# Patient Record
Sex: FEMALE | Race: WHITE | NOT HISPANIC OR LATINO | Employment: OTHER | URBAN - METROPOLITAN AREA
[De-identification: names, ages, dates, MRNs, and addresses within clinical notes are randomized per-mention and may not be internally consistent; named-entity substitution may affect disease eponyms.]

---

## 2017-01-03 ENCOUNTER — OFFICE VISIT (OUTPATIENT)
Dept: MEDICAL GROUP | Age: 67
End: 2017-01-03
Payer: MEDICARE

## 2017-01-03 VITALS
WEIGHT: 239.6 LBS | HEART RATE: 84 BPM | TEMPERATURE: 98.3 F | DIASTOLIC BLOOD PRESSURE: 74 MMHG | HEIGHT: 64 IN | OXYGEN SATURATION: 98 % | SYSTOLIC BLOOD PRESSURE: 126 MMHG | RESPIRATION RATE: 16 BRPM | BODY MASS INDEX: 40.9 KG/M2

## 2017-01-03 DIAGNOSIS — Z09 HOSPITAL DISCHARGE FOLLOW-UP: ICD-10-CM

## 2017-01-03 DIAGNOSIS — J45.30 MILD PERSISTENT ASTHMA WITHOUT COMPLICATION: ICD-10-CM

## 2017-01-03 DIAGNOSIS — J10.1 INFLUENZA A H1N1 INFECTION: ICD-10-CM

## 2017-01-03 PROCEDURE — 99214 OFFICE O/P EST MOD 30 MIN: CPT | Performed by: PHYSICIAN ASSISTANT

## 2017-01-03 ASSESSMENT — PATIENT HEALTH QUESTIONNAIRE - PHQ9: CLINICAL INTERPRETATION OF PHQ2 SCORE: 0

## 2017-01-03 NOTE — ASSESSMENT & PLAN NOTE
Chronic history of asthma for about 10 years. Currently taking Advair. Has a rescue inhaler and nebulizer medication at home. Sees a pulmonologist Dr. Borja. Has an appointment with him tomorrow.

## 2017-01-03 NOTE — PROGRESS NOTES
Subjective:   Lauren Esquivel is a 66 y.o. female here today for hospitalization follow-up secondary to influenza complicated by asthma.    Influenza A H1N1 infection  This is a 66-year-old female with a history of asthma who developed influenza-like symptoms on Christmas so she has body aches and chills. She had a fever when she was evaluated at the [facility]. Eventually she was admitted into [facility]. Found to be positive for influenza A. Chest x-ray showed atelectasis. She was treated with Tamiflu and prednisone for 5 days. She finished her medications yesterday. She was discharged on oxygen. She is currently at 2 L. Has a pulse ox and her pulse ox has not dipped down below 90. Denies any current chest pain or shortness of breath. Today she was evaluated by respiratory therapist at home.    Mild persistent asthma without complication  Chronic history of asthma for about 10 years. Currently taking Advair. Has a rescue inhaler and nebulizer medication at home. Sees a pulmonologist Dr. Borja. Has an appointment with him tomorrow.       Current medicines (including changes today)  Current Outpatient Prescriptions   Medication Sig Dispense Refill   • telmisartan (MICARDIS) 40 MG Tab Take 1 Tab by mouth every day. 90 Tab 1   • albuterol (VENTOLIN OR PROVENTIL) 108 (90 BASE) MCG/ACT Aero Soln inhalation aerosol Inhale 2 Puffs by mouth every 6 hours as needed for Shortness of Breath.     • Ascorbic Acid (VITAMIN C PO) Take  by mouth.     • Cholecalciferol (VITAMIN D3) 2000 UNIT CAPS Take 1 Cap by mouth every day. 30 Cap 11   • fluticasone-salmeterol (ADVAIR) 100-50 MCG/DOSE AEPB Inhale 1 Puff by mouth every 12 hours.     • Multiple Vitamin (MULTI-VITAMIN) TABS Take 1 Tab by mouth every day.     • triamcinolone acetonide (KENALOG) 0.1 % Cream   2   • albuterol (PROVENTIL) 2.5mg/3ml Nebu Soln solution for nebulization 3 mL by Nebulization route every 6 hours as needed for Shortness of Breath. 75 mL 5     No  "current facility-administered medications for this visit.     She  has a past medical history of Sleep apnea; Anemia; COPD; ASTHMA; HTN; Wheeze (12/4/2009); Chronic rhinitis (1/3/2010); Anxiety state, unspecified (1/3/2010); Respiratory malfunction arising from mental factors (1/3/2010); Laryngeal spasm (1/3/2010); Preventative health care (8/11/2010); HTN (hypertension) (8/11/2010); Hyperlipidemia (8/28/2010); Vitamin d deficiency (8/28/2010); Mycoplasma pneumonia (2003); H/O: female infertility (8/10/2011); Elevated C-reactive protein (CRP) (2/6/2012); Hyperglycemia (6/4/2012); Metabolic syndrome (6/4/2012); Obesity (6/4/2012); Pyelonephritis (7/30/2012); Kidney filling defect (8/21/2012); H/O pyelonephritis (8/21/2012); History of nephrolithiasis (8/21/2012); and Influenza A with pneumonia (12/27/2016).    ROS   No chest pain, no shortness of breath, no abdominal pain and all other systems were reviewed and are negative.       Objective:     Blood pressure 126/74, pulse 84, temperature 36.8 °C (98.3 °F), resp. rate 16, height 1.626 m (5' 4.02\"), weight 108.682 kg (239 lb 9.6 oz), last menstrual period 06/04/2005, SpO2 98 %. Body mass index is 41.11 kg/(m^2).   Physical Exam:  Constitutional: Alert, no distress, wearing cannula.  Skin: Warm, dry, good turgor, no rashes in visible areas.  Eye: Equal, round and reactive, conjunctiva clear, lids normal.  ENMT: Lips without lesions, good dentition, oropharynx clear.  Neck: Trachea midline, no masses.   Lymph: No cervical or supraclavicular lymphadenopathy  Respiratory: Unlabored respiratory effort, lungs clear to auscultation, no wheezes, no ronchi.  Cardiovascular: Normal S1, S2, no murmur, no edema.  Abdomen: Soft, non-tender, no masses.  Psych: Alert and oriented x3, normal affect and mood.        Assessment and Plan:   The following treatment plan was discussed    1. Influenza A H1N1 infection  Acute new onset condition. Resolving. Finish medications. Advised to " return to the ED with any worsening symptoms such as chest pain or shortness of breath.    2. Mild persistent asthma without complication  Chronic condition and stable. Continue as a medications as directed. Follow-up with Dr. Borja, pulmonologist, tomorrow.    3. Hospital discharge follow-up      Followup: Return if symptoms worsen or fail to improve.    Please note that this dictation was created using voice recognition software. I have made every reasonable attempt to correct obvious errors, but I expect that there are errors of grammar and possibly content that I did not discover before finalizing the note.

## 2017-01-03 NOTE — ASSESSMENT & PLAN NOTE
This is a 66-year-old female with a history of asthma who developed influenza-like symptoms on Christmas so she has body aches and chills. She had a fever when she was evaluated at the [facility]. Eventually she was admitted into [facility]. Found to be positive for influenza A. Chest x-ray showed atelectasis. She was treated with Tamiflu and prednisone for 5 days. She finished her medications yesterday. She was discharged on oxygen. She is currently at 2 L. Has a pulse ox and her pulse ox has not dipped down below 90. Denies any current chest pain or shortness of breath. Today she was evaluated by respiratory therapist at home.

## 2017-01-03 NOTE — MR AVS SNAPSHOT
"        Lauren Parr Alvin   1/3/2017 3:00 PM   Office Visit   MRN: [id number]    Department:  [address] [place] [address]   Dept Phone:  [phone]    Description:  Female : 1950   Provider:  Jovanny Rucker PA-C           Reason for Visit     Hospital Follow-up [facility]-Influenza A positive and asthma      Allergies as of 1/3/2017     Allergen Noted Reactions    Ace Inhibitors 2009       Cough    Ciprofloxacin 2013         You were diagnosed with     Influenza A H1N1 infection   [803694]       Mild persistent asthma without complication   [601059]       Hospital discharge follow-up   [[id number]]         Vital Signs     Blood Pressure Pulse Temperature Respirations Height Weight    126/74 mmHg 84 36.8 °C (98.3 °F) 16 1.626 m (5' 4.02\") 108.682 kg (239 lb 9.6 oz)    Body Mass Index Oxygen Saturation Last Menstrual Period Smoking Status          41.11 kg/m2 98% 2005 Never Smoker         Basic Information     Date Of Birth Sex Race Ethnicity Preferred Language    1950 Female White Non- English      Your appointments     Feb 10, 2017 12:50 PM   Established Patient with Clair Blount M.D.   [address] [address] [phone]           You will be receiving a confirmation call a few days before your appointment from our automated call confirmation system.              Problem List              ICD-10-CM Priority Class Noted - Resolved    Sleep apnea G47.30   Unknown - Present    H/O: iron deficiency anemia Z86.2   Unknown - Present    Chronic rhinitis J31.0   1/3/2010 - Present    Preventative health care Z00.00   2010 - Present    HTN (hypertension) I10   2010 - Present    Hyperlipidemia E78.5   2010 - Present    Vitamin D insufficiency E55.9   2010 - Present    H/O: female infertility Z87.42   8/10/2011 - Present    Elevated C-reactive protein (CRP) R79.82   2012 - Present    Hyperglycemia " R73.9   6/4/2012 - Present    Metabolic syndrome E88.81   6/4/2012 - Present    Kidney filling defect R93.429   8/21/2012 - Present    H/O pyelonephritis Z87.440   8/21/2012 - Present    History of nephrolithiasis Z87.442   8/21/2012 - Present    Morbid obesity with body mass index of 40.0-44.9 in adult ([facility]) E66.01, Z68.41   1/23/2016 - Present    Influenza A H1N1 infection J10.1   12/27/2016 - Present    Mild persistent asthma without complication J45.30   1/3/2017 - Present      Health Maintenance        Date Due Completion Dates    IMM DTaP/Tdap/Td Vaccine (1 - Tdap) 8/16/1968 ---    IMM ZOSTER VACCINE 8/16/2009 ---    IMM INFLUENZA (1) 1/3/2018 (Originally 9/1/2016) 10/14/2009    IMM PNEUMOCOCCAL 65+ (ADULT) LOW/MEDIUM RISK SERIES (2 of 2 - PPSV23) 1/22/2017 1/22/2016    MAMMOGRAM 8/3/2017 8/3/2016, 7/24/2015, 2/10/2015, 2/10/2015, 2/5/2015, 1/29/2015, 1/29/2015, 7/16/2013, 8/24/2010, 8/24/2010    BONE DENSITY 2/5/2020 2/5/2015    COLONOSCOPY 9/16/2024 9/16/2014, 6/28/2007            Current Immunizations     13-VALENT PCV PREVNAR 1/22/2016    Influenza TIV (IM) 10/14/2009      Below and/or attached are the medications your provider expects you to take. Review all of your home medications and newly ordered medications with your provider and/or pharmacist. Follow medication instructions as directed by your provider and/or pharmacist. Please keep your medication list with you and share with your provider. Update the information when medications are discontinued, doses are changed, or new medications (including over-the-counter products) are added; and carry medication information at all times in the event of emergency situations     Allergies:  ACE INHIBITORS - (reactions not documented)     CIPROFLOXACIN - (reactions not documented)               Medications  Valid as of: January 03, 2017 -  3:48 PM    Generic Name Brand Name Tablet Size Instructions for use    Albuterol Sulfate (Nebu Soln) PROVENTIL 2.5mg/3ml 3  mL by Nebulization route every 6 hours as needed for Shortness of Breath.        Albuterol Sulfate (Aero Soln) albuterol 108 (90 BASE) MCG/ACT Inhale 2 Puffs by mouth every 6 hours as needed for Shortness of Breath.        Ascorbic Acid   Take  by mouth.        Cholecalciferol (Cap) Vitamin D3 2000 UNIT Take 1 Cap by mouth every day.        Fluticasone-Salmeterol (AEROSOL POWDER, BREATH ACTIVATED) ADVAIR 100-50 MCG/DOSE Inhale 1 Puff by mouth every 12 hours.        Multiple Vitamin (Tab) MULTI-VITAMIN  Take 1 Tab by mouth every day.        Telmisartan (Tab) MICARDIS 40 MG Take 1 Tab by mouth every day.        Triamcinolone Acetonide (Cream) KENALOG 0.1 %         .                 Medicines prescribed today were sent to:     [facility] #[id number] - [place], [place] - [address]    [address] [address]    Phone: [phone] Fax: [phone]    Open 24 Hours?: No      Medication refill instructions:       If your prescription bottle indicates you have medication refills left, it is not necessary to call your provider’s office. Please contact your pharmacy and they will refill your medication.    If your prescription bottle indicates you do not have any refills left, you may request refills at any time through one of the following ways: The online [employer] system (except Urgent Care), by calling your provider’s office, or by asking your pharmacy to contact your provider’s office with a refill request. Medication refills are processed only during regular business hours and may not be available until the next business day. Your provider may request additional information or to have a follow-up visit with you prior to refilling your medication.   *Please Note: Medication refills are assigned a new Rx number when refilled electronically. Your pharmacy may indicate that no refills were authorized even though a new prescription for the same medication is available at the pharmacy. Please request the medicine by name  with the pharmacy before contacting your provider for a refill.           MyChart Access Code: Activation code not generated  Current MyChart Status: Active

## 2017-02-10 ENCOUNTER — APPOINTMENT (OUTPATIENT)
Dept: MEDICAL GROUP | Age: 67
End: 2017-02-10
Payer: MEDICARE

## 2017-03-17 ENCOUNTER — TELEPHONE (OUTPATIENT)
Dept: MEDICAL GROUP | Age: 67
End: 2017-03-17

## 2017-03-21 ENCOUNTER — HOSPITAL ENCOUNTER (OUTPATIENT)
Dept: RADIOLOGY | Facility: MEDICAL CENTER | Age: 67
End: 2017-03-21
Attending: INTERNAL MEDICINE
Payer: MEDICARE

## 2017-03-21 DIAGNOSIS — J45.40 MODERATE PERSISTENT ASTHMA WITHOUT COMPLICATION: ICD-10-CM

## 2017-03-21 PROCEDURE — 71020 DX-CHEST-2 VIEWS: CPT

## 2017-03-27 RX ORDER — TELMISARTAN 40 MG/1
TABLET ORAL
Qty: 90 TAB | Refills: 0 | Status: SHIPPED | OUTPATIENT
Start: 2017-03-27 | End: 2017-06-28 | Stop reason: SDUPTHER

## 2017-05-26 ENCOUNTER — TELEPHONE (OUTPATIENT)
Dept: MEDICAL GROUP | Age: 67
End: 2017-05-26

## 2017-05-26 NOTE — TELEPHONE ENCOUNTER
ESTABLISHED PATIENT PRE-VISIT PLANNING     Note: Patient will not be contacted if there is no indication to call.     1.  Reviewed note from last office visit with PCP and/or other med group provider: Yes    2.  If any orders were placed at last visit, do we have Results/Consult Notes?        •  Labs - Labs ordered, completed and results are in chart.       •  Imaging - Imaging ordered, completed and results are in chart.       •  Referrals - No referrals were ordered at last office visit.    3.  Immunizations were updated in Epic using WebIZ?: Epic matches WebIZ       •  Web Iz Recommendations: HEPATITIS A  TDAP ZOSTAVAX (Shingles)    4.  Patient is due for the following Health Maintenance Topics:   Health Maintenance Due   Topic Date Due   • IMM DTaP/Tdap/Td Vaccine (1 - Tdap) 08/10/2008   • IMM ZOSTER VACCINE  08/16/2009   • IMM PNEUMOCOCCAL 65+ (ADULT) LOW/MEDIUM RISK SERIES (2 of 2 - PPSV23) 01/22/2017   • Annual Wellness Visit  01/22/2017           5.  Patient was not informed to arrive 15 min prior to their scheduled appointment and bring in their medication bottles.

## 2017-05-30 ENCOUNTER — APPOINTMENT (OUTPATIENT)
Dept: MEDICAL GROUP | Age: 67
End: 2017-05-30
Payer: MEDICARE

## 2017-06-01 ENCOUNTER — APPOINTMENT (OUTPATIENT)
Dept: MEDICAL GROUP | Age: 67
End: 2017-06-01
Payer: MEDICARE

## 2017-06-29 RX ORDER — TELMISARTAN 40 MG/1
TABLET ORAL
Qty: 90 TAB | Refills: 0 | Status: SHIPPED | OUTPATIENT
Start: 2017-06-29 | End: 2017-10-06 | Stop reason: SDUPTHER

## 2017-08-10 ENCOUNTER — PATIENT OUTREACH (OUTPATIENT)
Dept: HEALTH INFORMATION MANAGEMENT | Facility: OTHER | Age: 67
End: 2017-08-10

## 2017-08-10 NOTE — PROGRESS NOTES
Outcome: Requested Call Back    WebIZ Checked & Epic Updated:  yes    [facility] Verified: no    Attempt # 1

## 2017-08-21 NOTE — PROGRESS NOTES
Attempt #:2    WebIZ Checked & Epic Updated: yes  [facility]Connect Verified: no  Verify PCP: yes    Communication Preference Obtained: yes     Review Care Team: yes    Annual Wellness Visit Scheduling  1. Scheduling Status:Scheduled        Care Gap Scheduling (Attempt to Schedule EACH Overdue Care Gap!)     Health Maintenance Due   Topic Date Due   • IMM DTaP/Tdap/Td Vaccine (1 - Tdap) Informed   • IMM ZOSTER VACCINE  Informed   • IMM PNEUMOCOCCAL 65+ (ADULT) LOW/MEDIUM RISK SERIES (2 of 2 - PPSV23) Informed   • Annual Wellness Visit  Scheduled   • MAMMOGRAM  Already Scheduled         [employer] Activation: already active  [employer] Gosia: no  Virtual Visits: no  Opt In to Text Messages: no

## 2017-08-25 ENCOUNTER — HOSPITAL ENCOUNTER (OUTPATIENT)
Dept: RADIOLOGY | Facility: MEDICAL CENTER | Age: 67
End: 2017-08-25
Attending: FAMILY MEDICINE
Payer: MEDICARE

## 2017-08-25 DIAGNOSIS — Z12.31 SCREENING MAMMOGRAM, ENCOUNTER FOR: ICD-10-CM

## 2017-08-25 PROCEDURE — 77063 BREAST TOMOSYNTHESIS BI: CPT

## 2017-08-29 ENCOUNTER — OFFICE VISIT (OUTPATIENT)
Dept: MEDICAL GROUP | Facility: MEDICAL CENTER | Age: 67
End: 2017-08-29
Payer: MEDICARE

## 2017-08-29 VITALS
OXYGEN SATURATION: 93 % | SYSTOLIC BLOOD PRESSURE: 138 MMHG | HEIGHT: 65 IN | WEIGHT: 238 LBS | BODY MASS INDEX: 39.65 KG/M2 | TEMPERATURE: 99 F | DIASTOLIC BLOOD PRESSURE: 84 MMHG | HEART RATE: 71 BPM

## 2017-08-29 DIAGNOSIS — R93.429 KIDNEY FILLING DEFECT: ICD-10-CM

## 2017-08-29 DIAGNOSIS — R73.9 HYPERGLYCEMIA: ICD-10-CM

## 2017-08-29 DIAGNOSIS — Z87.442 HISTORY OF NEPHROLITHIASIS: ICD-10-CM

## 2017-08-29 DIAGNOSIS — Z00.00 MEDICARE ANNUAL WELLNESS VISIT, SUBSEQUENT: Primary | ICD-10-CM

## 2017-08-29 DIAGNOSIS — Z86.2 H/O: IRON DEFICIENCY ANEMIA: ICD-10-CM

## 2017-08-29 DIAGNOSIS — I10 ESSENTIAL HYPERTENSION: ICD-10-CM

## 2017-08-29 DIAGNOSIS — E88.810 METABOLIC SYNDROME: ICD-10-CM

## 2017-08-29 DIAGNOSIS — Z87.448 H/O PYELONEPHRITIS: ICD-10-CM

## 2017-08-29 DIAGNOSIS — R79.82 ELEVATED C-REACTIVE PROTEIN (CRP): ICD-10-CM

## 2017-08-29 DIAGNOSIS — E66.01 MORBID OBESITY WITH BODY MASS INDEX OF 40.0-44.9 IN ADULT (HCC): ICD-10-CM

## 2017-08-29 DIAGNOSIS — J10.1 INFLUENZA A H1N1 INFECTION: ICD-10-CM

## 2017-08-29 DIAGNOSIS — J45.30 MILD PERSISTENT ASTHMA WITHOUT COMPLICATION: ICD-10-CM

## 2017-08-29 DIAGNOSIS — E78.5 HYPERLIPIDEMIA, UNSPECIFIED HYPERLIPIDEMIA TYPE: ICD-10-CM

## 2017-08-29 DIAGNOSIS — J31.0 CHRONIC RHINITIS: ICD-10-CM

## 2017-08-29 DIAGNOSIS — Z87.42: ICD-10-CM

## 2017-08-29 DIAGNOSIS — Z00.00 PREVENTATIVE HEALTH CARE: ICD-10-CM

## 2017-08-29 DIAGNOSIS — E55.9 VITAMIN D INSUFFICIENCY: ICD-10-CM

## 2017-08-29 PROCEDURE — G0439 PPPS, SUBSEQ VISIT: HCPCS | Performed by: NURSE PRACTITIONER

## 2017-08-29 RX ORDER — FLUTICASONE PROPIONATE 110 UG/1
2 AEROSOL, METERED RESPIRATORY (INHALATION) 2 TIMES DAILY
COMMUNITY
End: 2018-05-08

## 2017-08-29 ASSESSMENT — PATIENT HEALTH QUESTIONNAIRE - PHQ9: CLINICAL INTERPRETATION OF PHQ2 SCORE: 0

## 2017-08-29 NOTE — PATIENT INSTRUCTIONS
Continue with care through Yessy Paz M.D..  Next Medicare Annual Wellness Visit is due in one year.    Continue care with specialists you are seeing for your chronic problems.    Attached is some preventative information for you to read.          Fall Prevention and Home Safety  Falls cause injuries and can affect all age groups. It is possible to prevent falls.   HOW TO PREVENT FALLS  · Wear shoes with rubber soles that do not have an opening for your toes.   · Keep the inside and outside of your house well lit.   · Use night lights throughout your home.   · Remove clutter from floors.   · Clean up floor spills.   · Remove throw rugs or fasten them to the floor with carpet tape.   · Do not place electrical cords across pathways.   · Put grab bars by your tub, shower, and toilet. Do not use towel bars as grab bars.   · Put handrails on both sides of the stairway. Fix loose handrails.   · Do not climb on stools or stepladders, if possible.   · Do not wax your floors.   · Repair uneven or unsafe sidewalks, walkways, or stairs.   · Keep items you use a lot within reach.   · Be aware of pets.   · Keep emergency numbers next to the telephone.   · Put smoke detectors in your home and near bedrooms.   Ask your doctor what other things you can do to prevent falls.  Document Released: 10/14/2010 Document Revised: 06/18/2013 Document Reviewed: 03/19/2013  ExitCare® Patient Information ©2013 [employer].    Exercise to Stay Healthy      Exercise helps you become and stay healthy.  EXERCISE IDEAS AND TIPS  Choose exercises that:  · You enjoy.   · Fit into your day.   You do not need to exercise really hard to be healthy. You can do exercises at a slow or medium level and stay healthy. You can:  · Stretch before and after working out.   · Try yoga, Pilates, or estefania chi.   · Lift weights.   · Walk fast, swim, jog, run, climb stairs, bicycle, dance, or rollerskate.   · Take aerobic classes.   Exercises that burn about 150  calories:  · Running 1 ½ miles in 15 minutes.   · Playing volleyball for 45 to 60 minutes.   · Washing and waxing a car for 45 to 60 minutes.   · Playing touch football for 45 minutes.   · Walking 1 ¾ miles in 35 minutes.   · Pushing a stroller 1 ½ miles in 30 minutes.   · Playing basketball for 30 minutes.   · Raking leaves for 30 minutes.   · Bicycling 5 miles in 30 minutes.   · Walking 2 miles in 30 minutes.   · Dancing for 30 minutes.   · Shoveling snow for 15 minutes.   · Swimming laps for 20 minutes.   · Walking up stairs for 15 minutes.   · Bicycling 4 miles in 15 minutes.   · Gardening for 30 to 45 minutes.   · Jumping rope for 15 minutes.   · Washing windows or floors for 45 to 60 minutes.     One suggestion is to start walking for 10 minutes after each meal.  This will help with digestion and help you to metabolize your meal.       For Weight Loss -   Recommend portion sizes with more fruits/vegetables/high fiber foods.  Stay away from white bread/pastas/white rice/white potatoes.  Increase Fluid intake to 6-8 glasses of water daily.   Eliminate soda's (diet and regular) from your fluid intake.      Document Released: 01/20/2012 Document Revised: 03/11/2013 Document Reviewed: 01/20/2012  ExitCare® Patient Information ©2013 [email or web address].    Fat and Cholesterol Control Diet  Cholesterol is a wax-like substance. It comes from your liver and is found in certain foods. There is good (HDL) and bad (LDL) cholesterol. Too much cholesterol in your blood can affect your heart. Certain foods can lower or raise your cholesterol. Eat foods that are low in cholesterol.  Saturated and trans fats are bad fats found in foods that will raise your cholesterol. Do not eat foods that are high in saturated and trans fats.  FOODS HIGHER IN SATURATED AND TRANS FATS  · Dairy products, such as whole milk, eggs, cheese, cream, and butter.   · Fatty meats, such as hot dogs, sausage, and salami.   · Fried foods.   · Trans fats which  are found in margarine and pre-made cookies, crackers, and baked goods.   · Tropical oils, such as coconut and palm oils.   Read package labels at the store. Do not buy products that use saturated or trans fats or hydrogenated oils. Find foods labeled:  · Low-fat.   · Low-saturated fat.   · Trans-fat-free.   · Low-cholesterol.   FOODS LOWER IN CHOLESTEROL  ·  Fruit.   · Vegetables.   · Beans, peas, and lentils.   · Fish.   · Lean meat, such as chicken (without skin) or ground turkey.   · Grains, such as barley, rice, couscous, bulgur wheat, and pasta.   · Heart-healthy tub margarine.   PREPARING YOUR FOOD  · Broil, bake, steam, or roast foods. Do not vieira food.   · Use non-stick cooking sprays.   · Use lemon or herbs to flavor food instead of using butter or stick margarine.   · Use nonfat yogurt, salsa, or low-fat dressings for salads.   Document Released: 06/18/2013 Document Reviewed: 06/18/2013  ExitCare® Patient Information ©2013 [employer].    Recommend annual flu vaccine.  Next due in Fall, 2017.  If you decide not to have the flu vaccine, recommend good handwashing and staying out of crowds during flu season.

## 2017-08-29 NOTE — ASSESSMENT & PLAN NOTE
7/27/2016   Glycohemoglobin 6.3  4.8 - 5.6 % Final   recom attention to carbohydrate intake, exercise  Followed by Yessy Paz M.D..

## 2017-08-29 NOTE — ASSESSMENT & PLAN NOTE
Chronic condition managed with current medical regimen  Elevated glucose, obesity, elevated CRP, htn, elevated lipids   Continue with current meds  Followed by Yessy Paz M.D..

## 2017-08-29 NOTE — ASSESSMENT & PLAN NOTE
Patient aware of relationship between weight and health and working on diet  Followed by Yessy Paz M.D..

## 2017-08-29 NOTE — ASSESSMENT & PLAN NOTE
Chronic condition managed with current medical regimen  Stable per review   Continue with current meds  Followed by Yessy Paz M.D..

## 2017-08-29 NOTE — ASSESSMENT & PLAN NOTE
Chronic condition managed with current medical regimen  Labs reviewed    Continue with diet management at this time  Followed by Yessy Paz M.D..

## 2017-08-29 NOTE — PROGRESS NOTES
CC:   Medicare Annual Wellness Visit    HPI:  Lauren is a 67 y.o. here for Medicare Annual Wellness Visit    Patient Active Problem List    Diagnosis Date Noted   • Mild persistent asthma without complication 01/03/2017   • Morbid obesity with body mass index of 40.0-44.9 in adult (CMS-HCC) 01/23/2016   • Kidney filling defect 08/21/2012   • History of nephrolithiasis 08/21/2012   • Hyperglycemia 06/04/2012   • Metabolic syndrome 06/04/2012   • Elevated C-reactive protein (CRP) 02/06/2012   • Hyperlipidemia 08/28/2010   • Vitamin D insufficiency 08/28/2010   • Preventative health care 08/11/2010   • HTN (hypertension) 08/11/2010   • Chronic rhinitis 01/03/2010   • Sleep apnea    • H/O: iron deficiency anemia      Current Outpatient Prescriptions   Medication Sig Dispense Refill   • cholecalciferol (VITAMIN D3) 5000 UNIT Cap Take 5,000 Units by mouth every day.     • fluticasone (FLOVENT HFA) 110 MCG/ACT Aerosol Inhale 2 Puffs by mouth 2 times a day.     • telmisartan (MICARDIS) 40 MG Tab TAKE 1 TAB BY MOUTH EVERY DAY. 90 Tab 0   • albuterol (VENTOLIN OR PROVENTIL) 108 (90 BASE) MCG/ACT Aero Soln inhalation aerosol Inhale 2 Puffs by mouth every 6 hours as needed for Shortness of Breath.     • albuterol (PROVENTIL) 2.5mg/3ml Nebu Soln solution for nebulization 3 mL by Nebulization route every 6 hours as needed for Shortness of Breath. 75 mL 5   • Ascorbic Acid (VITAMIN C PO) Take  by mouth.       No current facility-administered medications for this visit.       Current supplements: no  Chronic narcotic pain medicines: no  Allergies: Ace inhibitors and Ciprofloxacin  Exercise: yes  Current social contact/activities: Has support of family and friends      Screening:  Depression Screening    Little interest or pleasure in doing things?  0 - not at all  Feeling down, depressed , or hopeless? 0 - not at all  Patient Health Questionnaire Score: 0     If depressive symptoms identified deferred to follow up visit unless  specifically addressed in assessment and plan.    Interpretation of PHQ-9 Total Score   Score Severity   1-4 No Depression   5-9 Mild Depression   10-14 Moderate Depression   15-19 Moderately Severe Depression   20-27 Severe Depression    Screening for Cognitive Impairment    Three Minute Recall (apple, watch, jeronimo)  3/3    Draw clock face with all 12 numbers set to the hand to show 10 minutes past 11 o'clock  1 5  Cognitive concerns identified deferred for follow up unless specifically addressed in assessment and plan.    Fall Risk Assessment    Has the patient had two or more falls in the last year or any fall with injury in the last year?  No    Safety Assessment    Throw rugs on floor.  Yes  Handrails on all stairs.  Yes  Good lighting in all hallways.  Yes  Difficulty hearing.  No  Patient counseled about all safety risks that were identified.    Functional Assessment ADLs    Are there any barriers preventing you from cooking for yourself or meeting nutritional needs?  No.    Are there any barriers preventing you from driving safely or obtaining transportation?  No.    Are there any barriers preventing you from using a telephone or calling for help?  No.    Are there any barriers preventing you from shopping?  No.    Are there any barriers preventing you from taking care of your own finances?  No.    Are there any barriers preventing you from managing your medications?  No.    Are currently engaging any exercise or physical activity?  No.       Health Maintenance Summary                IMM DTaP/Tdap/Td Vaccine Overdue 8/10/2008      Done 8/9/2008 Imm Admin: TD Vaccine    IMM ZOSTER VACCINE Overdue 8/16/2009     IMM PNEUMOCOCCAL 65+ (ADULT) LOW/MEDIUM RISK SERIES Overdue 1/22/2017      Done 1/22/2016 Imm Admin: Pneumococcal Conjugate Vaccine (Prevnar/PCV-13)    Annual Wellness Visit Overdue 1/22/2017      Done 1/22/2016 Visit Dx: Medicare annual wellness visit, subsequent    IMM INFLUENZA Postponed 1/3/2018  Originally 9/1/2017. Patient Refused     Done 10/14/2009 Imm Admin: Influenza TIV (IM)    MAMMOGRAM Next Due 8/25/2018      Done 8/25/2017 MA-MAMMO SCREENING BILAT W/TOMOSYNTHESIS W/CAD     Patient has more history with this topic...    BONE DENSITY Next Due 2/5/2020      Done 2/5/2015 DS-BONE DENSITY STUDY (DEXA)    COLONOSCOPY Next Due 9/16/2024      Done 9/16/2014 AMB REFERRAL TO GI FOR COLONOSCOPY     Patient has more history with this topic...          Patient Care Team:  Yessy Paz M.D. as PCP - General (Family Medicine)  Aguilar Borja M.D. as Consulting Physician (Pulmonary Medicine)        Social History   Substance Use Topics   • Smoking status: Never Smoker   • Smokeless tobacco: Never Used   • Alcohol use No       Family History   Problem Relation Age of Onset   • Lung Disease Mother    • Hypertension Mother    • Heart Disease Father    • Hypertension Maternal Grandfather    • Stroke Brother      She  has a past medical history of Anemia; Anxiety state, unspecified (1/3/2010); ASTHMA; Chronic rhinitis (1/3/2010); COPD; Elevated C-reactive protein (CRP) (2/6/2012); H/O pyelonephritis (8/21/2012); H/O: female infertility (8/10/2011); History of nephrolithiasis (8/21/2012); HTN; HTN (hypertension) (8/11/2010); Hyperglycemia (6/4/2012); Hyperlipidemia (8/28/2010); Influenza A with pneumonia (12/27/2016); Kidney filling defect (8/21/2012); Laryngeal spasm (1/3/2010); Metabolic syndrome (6/4/2012); Mycoplasma pneumonia (2003); Obesity (6/4/2012); Preventative health care (8/11/2010); Pyelonephritis (7/30/2012); Respiratory malfunction arising from mental factors (1/3/2010); Sleep apnea; Vitamin d deficiency (8/28/2010); and Wheeze (12/4/2009). She also has no past medical history of Encounter for long-term (current) use of other medications.   Past Surgical History:   Procedure Laterality Date   • LAPAROSCOPY      for infertility   • TONSILLECTOMY AND ADENOIDECTOMY         ROS:    Ostomy or other tubes or  "amputations: no  Chronic oxygen use no  Last eye exam 2016  : Denies incontinence.   Gait: Uses no assistive device   Hearing excellent.    Dentition good    Exam: Blood pressure 138/84, pulse 71, temperature 37.2 °C (99 °F), height 1.651 m (5' 5\"), weight 108 kg (238 lb), last menstrual period 06/04/2005, SpO2 93 %. Body mass index is 39.61 kg/m².  Alert, oriented in no acute distress.  Eye contact is good, speech goal directed, affect calm      Assessment and Plan. The following treatment and monitoring plan is recommended for all problems as listed below:   Sleep apnea  CPAP nocly  Followed by pulm    H/O: iron deficiency anemia  Followed by Yessy Paz M.D..     Chronic rhinitis  Chronic condition managed with current medical regimen  Stable per review   Continue with current meds  Followed by Yessy Paz M.D..        Preventative health care  Managed by Yessy Paz M.D.     HTN (hypertension)  Chronic condition managed with current medical regimen  Stable per review   Continue with current meds  Followed by Yessy Paz M.D..        Hyperlipidemia  Chronic condition managed with current medical regimen  Labs reviewed    Continue with diet management at this time  Followed by Yessy Paz M.D..        Vitamin D insufficiency  No current lab in chart to evaluate   Continue with current meds  Followed by Yessy Paz M.D..     H/O: female infertility  Historical data    Elevated C-reactive protein (CRP)  7/27/2016   Glycohemoglobin 6.3  4.8 - 5.6 % Final   recom attention to carbohydrate intake, exercise  Followed by Yessy Paz M.D..       Hyperglycemia  Chronic condition managed with current medical regimen  7/27/2016   Glucose 102  65 - 99 mg/dL Final   Continue with diet management  Followed by Yessy Paz M.D..        Kidney filling defect  There is not a current u/s for comparison    H/O pyelonephritis  No recurrence    History of nephrolithiasis  Last event 2014  States stone was not caught  Followed by Yessy Paz M.D..     Morbid " obesity with body mass index of 40.0-44.9 in adult  Patient aware of relationship between weight and health and working on diet  Followed by Yessy Paz M.D..     Influenza A H1N1 infection  Historical data    Mild persistent asthma without complication  Chronic condition managed with current medical regimen  Stable per review   Continue with current meds  Followed by pulm       Metabolic syndrome  Chronic condition managed with current medical regimen  Elevated glucose, obesity, elevated CRP, htn, elevated lipids   Continue with current meds  Followed by Yessy Paz M.D..          Health Care Screening recommendations reviewed with patient today: per Patient Instructions  DPA/Advanced directive: .has NO advanced directive - not interested in additional information    Next office visit for recheck of chronic medical conditions is due with Yessy Paz M.D. in 2-3 months

## 2017-08-29 NOTE — ASSESSMENT & PLAN NOTE
Chronic condition managed with current medical regimen  Stable per review   Continue with current meds  Followed by pulm

## 2017-08-29 NOTE — ASSESSMENT & PLAN NOTE
Chronic condition managed with current medical regimen  7/27/2016   Glucose 102  65 - 99 mg/dL Final   Continue with diet management  Followed by Yessy Paz M.D..

## 2017-10-03 ENCOUNTER — OFFICE VISIT (OUTPATIENT)
Dept: MEDICAL GROUP | Age: 67
End: 2017-10-03
Payer: MEDICARE

## 2017-10-03 VITALS
TEMPERATURE: 98.3 F | DIASTOLIC BLOOD PRESSURE: 74 MMHG | SYSTOLIC BLOOD PRESSURE: 122 MMHG | HEART RATE: 91 BPM | BODY MASS INDEX: 39.55 KG/M2 | HEIGHT: 65 IN | OXYGEN SATURATION: 93 % | WEIGHT: 237.4 LBS

## 2017-10-03 DIAGNOSIS — M79.10 MUSCLE SORENESS: ICD-10-CM

## 2017-10-03 DIAGNOSIS — E03.9 HYPOTHYROIDISM, UNSPECIFIED TYPE: ICD-10-CM

## 2017-10-03 DIAGNOSIS — Z86.2 H/O: IRON DEFICIENCY ANEMIA: ICD-10-CM

## 2017-10-03 DIAGNOSIS — J45.30 MILD PERSISTENT ASTHMA WITHOUT COMPLICATION: ICD-10-CM

## 2017-10-03 DIAGNOSIS — E78.00 PURE HYPERCHOLESTEROLEMIA: ICD-10-CM

## 2017-10-03 DIAGNOSIS — I10 ESSENTIAL HYPERTENSION: Primary | ICD-10-CM

## 2017-10-03 DIAGNOSIS — G47.30 SLEEP APNEA, UNSPECIFIED TYPE: ICD-10-CM

## 2017-10-03 DIAGNOSIS — E55.9 VITAMIN D INSUFFICIENCY: ICD-10-CM

## 2017-10-03 DIAGNOSIS — Z23 NEED FOR ZOSTER VACCINATION: ICD-10-CM

## 2017-10-03 DIAGNOSIS — Z23 FLU VACCINE NEED: ICD-10-CM

## 2017-10-03 DIAGNOSIS — R73.03 PREDIABETES: ICD-10-CM

## 2017-10-03 DIAGNOSIS — E66.9 OBESITY (BMI 30-39.9): ICD-10-CM

## 2017-10-03 PROCEDURE — G0008 ADMIN INFLUENZA VIRUS VAC: HCPCS | Performed by: FAMILY MEDICINE

## 2017-10-03 PROCEDURE — 99215 OFFICE O/P EST HI 40 MIN: CPT | Mod: 25 | Performed by: FAMILY MEDICINE

## 2017-10-03 PROCEDURE — 90662 IIV NO PRSV INCREASED AG IM: CPT | Performed by: FAMILY MEDICINE

## 2017-10-03 NOTE — PROGRESS NOTES
Subjective:   CC:Establish care    HPI:     Lauren Esquivel is a 67 y.o. female, established patient of the clinic, presents with the following concerns:     1. Essential hypertension  Chronic, taking telmisartan as directed, denies any side effect.  Denies chest pain, shortness of breath, dyspnea on exertion, unusual edema, dizziness.    2. Pure hypercholesterolemia  Chronic, worsening hyperlipidemia. Dr. Blount recommended dietary modification, exercise, and weight loss. Patient has been monitoring her diet, however, she has been sick with multiple respiratory illnesses, she was not able to to exercise consistently. Her weight is unchanged.    3. Prediabetes  Previous blood test noticed elevated A1c 6.3. Patient endorses family history of type 2 diabetes.  She denies polyuria, polydipsia. She has been working on dietary restriction. However, as mentioned above, she was sick with multiple respiratory illnesses, she was not able to exercise. Her weight remains stable.    4. Hypothyroidism, unspecified type  Patient was diagnosed with hypothyroidism by naturopathic doctor. She was put on natural thyroid 65 mg per day. She notices some improvement of metabolism, however, no change in body weight    5. Vitamin D insufficiency  Chronic, taking 10,000 units of vitamin D daily per recommendation of naturopathic doctor.     6. Mild persistent asthma without complication  Chronic, well controlled, taking Advair as directed daily.  Last exacerbation was couple months ago requiring oral prednisone.    7. Sleep apnea, unspecified type  Currently, using CPAP consistently, has regular follow-up with pulmonology    8. H/O: iron deficiency anemia  Patient had history of iron deficiency anemia in distant past. She reports active bleeding, fatigue, decreased memory, concentration.    9. Obesity (BMI 30-39.9)  BMI 39.2, stable, working on dietary modification, exercises, and weight loss.   However, patient finds it difficult to  lose weight due to multiple acute respiratory illnesses over the past months.  However, she is feeling better, she will try to restart exercise program as tolerated.    10. Muscle soreness  Patient complains of chronic bilateral thigh muscle soreness and stiffness for 4-5 years.  Patient attributes her symptoms to history of taking fluoroquinolone for acute respiratory illness in distant past.  She denies any history of fever, autoimmune disease, weight loss, night sweats, skin rash, active cancer.  She does not take statin medication.    Current medicines (including changes today)  Current Outpatient Prescriptions   Medication Sig Dispense Refill   • fluticasone-salmeterol (ADVAIR) 250-50 MCG/DOSE AEROSOL POWDER, BREATH ACTIVATED Inhale 1 Puff by mouth every 12 hours.     • Zoster Vaccine Live (ZOSTAVAX) 09962 UNT/0.65ML Recon Susp Inject 0.65 mL as instructed Once for 1 dose. 0.65 mL 0   • cholecalciferol (VITAMIN D3) 5000 UNIT Cap Take 5,000 Units by mouth every day.     • fluticasone (FLOVENT HFA) 110 MCG/ACT Aerosol Inhale 2 Puffs by mouth 2 times a day.     • telmisartan (MICARDIS) 40 MG Tab TAKE 1 TAB BY MOUTH EVERY DAY. 90 Tab 0   • Ascorbic Acid (VITAMIN C PO) Take  by mouth.     • NATURE-THROID 65 MG tablet   1     No current facility-administered medications for this visit.      She  has a past medical history of Anemia; Anxiety state, unspecified (1/3/2010); ASTHMA; Chronic rhinitis (1/3/2010); COPD; Elevated C-reactive protein (CRP) (2/6/2012); H/O pyelonephritis (8/21/2012); H/O: female infertility (8/10/2011); History of nephrolithiasis (8/21/2012); HTN; HTN (hypertension) (8/11/2010); Hyperglycemia (6/4/2012); Hyperlipidemia (8/28/2010); Influenza A with pneumonia (12/27/2016); Kidney filling defect (8/21/2012); Laryngeal spasm (1/3/2010); Metabolic syndrome (6/4/2012); Mycoplasma pneumonia (2003); Obesity (6/4/2012); Preventative health care (8/11/2010); Pyelonephritis (7/30/2012); Respiratory  "malfunction arising from mental factors (1/3/2010); Sleep apnea; Vitamin d deficiency (8/28/2010); and Wheeze (12/4/2009). She also has no past medical history of Encounter for long-term (current) use of other medications.    I personally reviewed patient's problem list, allergies, medications, family hx, social hx with patient and update EPIC.     REVIEW OF SYSTEMS:  CONSTITUTIONAL:  Denies night sweats, fatigue, malaise, lethargy, fever or chills.  RESPIRATORY:  Denies cough, wheeze, hemoptysis, or shortness of breath.  CARDIOVASCULAR:  Denies chest pains, palpitations, pedal edema     Objective:     Blood pressure 122/74, pulse 91, temperature 36.8 °C (98.3 °F), height 1.657 m (5' 5.25\"), weight 107.7 kg (237 lb 6.4 oz), last menstrual period 06/04/2005, SpO2 93 %. Body mass index is 39.2 kg/m².    Physical Exam:  Constitutional: awake, alert, in no distress. Obese.   Skin: Warm, dry, good turgor, no rashes, bruises, ulcers in visible areas.  Eye: conjunctiva clear, lids neg for edema or lesions.  Respiratory: Unlabored respiratory effort, lungs clear to auscultation, no wheezes, no rhonchi.  Cardiovascular: Normal S1, S2, no murmur, no pedal edema.  Abdomen: Soft, non-tender to palpation, no hernia, no hepatosplenomegaly.  Neuro: CN2-12 grossly intact. Strength 5/5, reflexes 2/4 in all extremities, no sensory deficits.   Psych: Oriented x3, affect and mood wnl, intact judgement and insight.   MSK: thigh muscle mildly tender to palpation, strength 5/5, reflexes 2/4, normal gait, able to rise w/o arm assistance from sitting position w/o problems.       Assessment and Plan:   The following treatment plan was discussed    1. Essential hypertension  Chronic, well controlled with telmisartan, blood pressure 122/74 today. Will continue.  - COMP METABOLIC PANEL; Future  - MICROALBUMIN CREAT RATIO URINE; Future    2. Pure hypercholesterolemia  Chronic, worsening hypercholesterolemia, patient is working on dietary " modification and exercise without success.  She is not interested in taking statin, however, she is open taking medicine if cholesterol continues continue to get worse. Plan  - LIPID PROFILE; Future  - Follow up plan depends on the results of her blood test  - Continue dietary modification, exercise, weight loss    3. Prediabetes  History of elevated A1c, 6.3 last year. Positive from the year history of type 2 diabetes. Denies symptoms of polyuria, polydipsia, and unexplained weight loss. Plan  - HEMOGLOBIN A1C; Future  - Continue lifestyle modification, weight loss    4. Hypothyroidism, unspecified type  Chronic, currently taking natural thyroid 65 mg daily, currently being managed by a naturopathic doctor. Plan:  - TSH WITH REFLEX TO FT4; Future    5. Vitamin D insufficiency  - VITAMIN D,25 HYDROXY; Future    6. Mild persistent asthma without complication  Chronic, well controlled with Advair, last exacerbation was couple months ago.  Asthma exacerbation is usually triggered by allergens, smoke, respiratory illnesses.  Patient had pneumonia vaccine in 2016, she believed she had another pneumonia vaccine from her pulmonologist.  She requesting flu vaccine today.    7. Sleep apnea, unspecified type  Chronic, continue CPAP.    8. H/O: iron deficiency anemia  - CBC WITH DIFFERENTIAL; Future    9. Obesity (BMI 30-39.9)  - Patient identified as having weight management issue.  Appropriate orders and counseling given.    10. Flu vaccine need  - INFLUENZA VACCINE, HIGH DOSE (65+ ONLY)    11. Need for zoster vaccination  - Zoster Vaccine Live (ZOSTAVAX) 23692 UNT/0.65ML Recon Susp; Inject 0.65 mL as instructed Once for 1 dose.  Dispense: 0.65 mL; Refill: 0    12. Muscle soreness  - TSH WITH REFLEX TO FT4; Future  - WESTERGREN SED RATE; Future  - CREATINE KINASE; Future    Total 40 minutes face-to-face time spent with patient, with greater than 50% of the total time discussing patient's issues and symptoms as listed above  in assessment and plan, as well as managing coordination of care for future evaluation and treatment.    Yessy Paz M.D.      Followup: Return in about 6 months (around 4/3/2018) for Annual wellness visit, [place].    Please note that this dictation was created using voice recognition software. I have made every reasonable attempt to correct obvious errors, but I expect that there are errors of grammar and possibly content that I did not discover before finalizing the note.

## 2017-10-06 RX ORDER — TELMISARTAN 40 MG/1
TABLET ORAL
Qty: 90 TAB | Refills: 1 | Status: SHIPPED | OUTPATIENT
Start: 2017-10-06 | End: 2018-04-01 | Stop reason: SDUPTHER

## 2017-11-07 LAB
25(OH)D3+25(OH)D2 SERPL-MCNC: 37 NG/ML (ref 30–100)
ALBUMIN SERPL-MCNC: 4.3 G/DL (ref 3.6–4.8)
ALBUMIN/CREAT UR: <9.1 MG/G CREAT (ref 0–30)
ALBUMIN/GLOB SERPL: 1.3 {RATIO} (ref 1.2–2.2)
ALP SERPL-CCNC: 62 IU/L (ref 39–117)
ALT SERPL-CCNC: 24 IU/L (ref 0–32)
AST SERPL-CCNC: 20 IU/L (ref 0–40)
BASOPHILS # BLD AUTO: 0.1 X10E3/UL (ref 0–0.2)
BASOPHILS NFR BLD AUTO: 1 %
BILIRUB SERPL-MCNC: 0.4 MG/DL (ref 0–1.2)
BUN SERPL-MCNC: 14 MG/DL (ref 8–27)
BUN/CREAT SERPL: 19 (ref 12–28)
CALCIUM SERPL-MCNC: 9.6 MG/DL (ref 8.7–10.3)
CHLORIDE SERPL-SCNC: 102 MMOL/L (ref 96–106)
CHOLEST SERPL-MCNC: 249 MG/DL (ref 100–199)
CO2 SERPL-SCNC: 24 MMOL/L (ref 18–29)
COMMENT 011824: ABNORMAL
CREAT SERPL-MCNC: 0.74 MG/DL (ref 0.57–1)
CREAT UR-MCNC: 32.8 MG/DL
EOSINOPHIL # BLD AUTO: 0.8 X10E3/UL (ref 0–0.4)
EOSINOPHIL NFR BLD AUTO: 10 %
ERYTHROCYTE [DISTWIDTH] IN BLOOD BY AUTOMATED COUNT: 13.8 % (ref 12.3–15.4)
GFR SERPLBLD CREATININE-BSD FMLA CKD-EPI: 84 ML/MIN/1.73
GFR SERPLBLD CREATININE-BSD FMLA CKD-EPI: 96 ML/MIN/1.73
GLOBULIN SER CALC-MCNC: 3.2 G/DL (ref 1.5–4.5)
GLUCOSE SERPL-MCNC: 103 MG/DL (ref 65–99)
HBA1C MFR BLD: 6.1 % (ref 4.8–5.6)
HCT VFR BLD AUTO: 42.4 % (ref 34–46.6)
HDLC SERPL-MCNC: 63 MG/DL
HGB BLD-MCNC: 13.7 G/DL (ref 11.1–15.9)
IMM GRANULOCYTES # BLD: 0 X10E3/UL (ref 0–0.1)
IMM GRANULOCYTES NFR BLD: 0 %
IMMATURE CELLS  115398: ABNORMAL
LDLC SERPL CALC-MCNC: 165 MG/DL (ref 0–99)
LYMPHOCYTES # BLD AUTO: 1.9 X10E3/UL (ref 0.7–3.1)
LYMPHOCYTES NFR BLD AUTO: 25 %
MCH RBC QN AUTO: 27.9 PG (ref 26.6–33)
MCHC RBC AUTO-ENTMCNC: 32.3 G/DL (ref 31.5–35.7)
MCV RBC AUTO: 86 FL (ref 79–97)
MICROALBUMIN UR-MCNC: <3 UG/ML
MONOCYTES # BLD AUTO: 0.6 X10E3/UL (ref 0.1–0.9)
MONOCYTES NFR BLD AUTO: 8 %
MORPHOLOGY BLD-IMP: ABNORMAL
NEUTROPHILS # BLD AUTO: 4.4 X10E3/UL (ref 1.4–7)
NEUTROPHILS NFR BLD AUTO: 56 %
NRBC BLD AUTO-RTO: ABNORMAL %
PLATELET # BLD AUTO: 362 X10E3/UL (ref 150–379)
POTASSIUM SERPL-SCNC: 4.4 MMOL/L (ref 3.5–5.2)
PROT SERPL-MCNC: 7.5 G/DL (ref 6–8.5)
RBC # BLD AUTO: 4.91 X10E6/UL (ref 3.77–5.28)
SODIUM SERPL-SCNC: 143 MMOL/L (ref 134–144)
TRIGL SERPL-MCNC: 106 MG/DL (ref 0–149)
TSH SERPL DL<=0.005 MIU/L-ACNC: 1.43 UIU/ML (ref 0.45–4.5)
VLDLC SERPL CALC-MCNC: 21 MG/DL (ref 5–40)
WBC # BLD AUTO: 7.8 X10E3/UL (ref 3.4–10.8)

## 2018-01-22 ENCOUNTER — OFFICE VISIT (OUTPATIENT)
Dept: MEDICAL GROUP | Age: 68
End: 2018-01-22
Payer: MEDICARE

## 2018-01-22 VITALS
HEIGHT: 65 IN | BODY MASS INDEX: 39.79 KG/M2 | SYSTOLIC BLOOD PRESSURE: 115 MMHG | WEIGHT: 238.8 LBS | TEMPERATURE: 99.3 F | OXYGEN SATURATION: 97 % | DIASTOLIC BLOOD PRESSURE: 74 MMHG | HEART RATE: 110 BPM

## 2018-01-22 DIAGNOSIS — I10 ESSENTIAL HYPERTENSION: ICD-10-CM

## 2018-01-22 DIAGNOSIS — E78.00 PURE HYPERCHOLESTEROLEMIA: ICD-10-CM

## 2018-01-22 DIAGNOSIS — J40 BRONCHITIS: Primary | ICD-10-CM

## 2018-01-22 DIAGNOSIS — R73.03 PREDIABETES: ICD-10-CM

## 2018-01-22 DIAGNOSIS — E66.9 OBESITY (BMI 30-39.9): ICD-10-CM

## 2018-01-22 DIAGNOSIS — J45.30 MILD PERSISTENT ASTHMA WITHOUT COMPLICATION: ICD-10-CM

## 2018-01-22 LAB
FLUAV+FLUBV AG SPEC QL IA: NEGATIVE
INT CON NEG: NEGATIVE
INT CON POS: POSITIVE

## 2018-01-22 PROCEDURE — 99214 OFFICE O/P EST MOD 30 MIN: CPT | Performed by: FAMILY MEDICINE

## 2018-01-22 PROCEDURE — 87804 INFLUENZA ASSAY W/OPTIC: CPT | Performed by: FAMILY MEDICINE

## 2018-01-22 RX ORDER — AZITHROMYCIN 250 MG/1
TABLET, FILM COATED ORAL
Qty: 6 TAB | Refills: 0 | Status: SHIPPED | OUTPATIENT
Start: 2018-01-22 | End: 2018-01-27

## 2018-01-22 RX ORDER — PREDNISONE 20 MG/1
40 TABLET ORAL DAILY
Qty: 10 TAB | Refills: 0 | Status: SHIPPED | OUTPATIENT
Start: 2018-01-22 | End: 2018-01-27

## 2018-01-22 RX ORDER — ALBUTEROL SULFATE 90 UG/1
2 AEROSOL, METERED RESPIRATORY (INHALATION) EVERY 6 HOURS PRN
Qty: 8.5 G | Refills: 2 | Status: SHIPPED | OUTPATIENT
Start: 2018-01-22 | End: 2018-09-18 | Stop reason: SDUPTHER

## 2018-01-22 NOTE — PROGRESS NOTES
Subjective:   CC: Flu symptoms    HPI:     Lauren Esquivel is a 67 y.o. Female with history of mild intermittent asthma, hypertension, lipidemia, prediabetes, and sleep apnea who presents with a five-day history of nonproductive cough, nasal congestion, nasal discharge, sore throat, mild shortness of breath, chills, subjective fever, and diffuse joint and muscle soreness. Patient states that it's very hard for her to keep her warm at night. Her cough is worse at night interfering with sleep and daytime productivity. She has tried a couple over-the-counter medications, which appear to help her, she is concerns of possible pneumonia infection as she had history of having bone infection in the past.    She is taking Advair consistently for asthma. However, she ran out of albuterol and requesting refill as her pulmonologist has retired.    She also wanted to discuss the results of her recent blood tests, which show prediabetes and hyperlipidemia. Patient is active, but no regular exercise. She has no dietary restrictions. She did try to lose weight on her own couple times without any success. She is very resistant to taking statin due to concerns of side effects.    Current medicines (including changes today)  Current Outpatient Prescriptions   Medication Sig Dispense Refill   • albuterol 108 (90 Base) MCG/ACT Aero Soln inhalation aerosol Inhale 2 Puffs by mouth every 6 hours as needed for Shortness of Breath. 8.5 g 2   • Hydrocod Polst-CPM Polst ER (TUSSIONEX) 10-8 MG/5ML Suspension Extended Release Take 5 mL by mouth every 12 hours for 10 days. 100 mL 0   • azithromycin (ZITHROMAX) 250 MG Tab Take 2 tablets on the first day and 1 tablet daily thereafter for 4 days. 6 Tab 0   • predniSONE (DELTASONE) 20 MG Tab Take 2 Tabs by mouth every day for 5 days. 10 Tab 0   • telmisartan (MICARDIS) 40 MG Tab TAKE 1 TAB BY MOUTH EVERY DAY. 90 Tab 1   • fluticasone-salmeterol (ADVAIR) 250-50 MCG/DOSE AEROSOL POWDER, BREATH  "ACTIVATED Inhale 1 Puff by mouth every 12 hours.     • cholecalciferol (VITAMIN D3) 5000 UNIT Cap Take 5,000 Units by mouth every day.     • fluticasone (FLOVENT HFA) 110 MCG/ACT Aerosol Inhale 2 Puffs by mouth 2 times a day.     • Ascorbic Acid (VITAMIN C PO) Take  by mouth.     • NATURE-THROID 65 MG tablet   1     No current facility-administered medications for this visit.      She  has a past medical history of Anemia; Anxiety state, unspecified (1/3/2010); ASTHMA; Chronic rhinitis (1/3/2010); COPD; Elevated C-reactive protein (CRP) (2/6/2012); H/O pyelonephritis (8/21/2012); H/O: female infertility (8/10/2011); History of nephrolithiasis (8/21/2012); HTN; HTN (hypertension) (8/11/2010); Hyperglycemia (6/4/2012); Hyperlipidemia (8/28/2010); Influenza A with pneumonia (12/27/2016); Kidney filling defect (8/21/2012); Laryngeal spasm (1/3/2010); Metabolic syndrome (6/4/2012); Mycoplasma pneumonia (2003); Obesity (6/4/2012); Preventative health care (8/11/2010); Pyelonephritis (7/30/2012); Respiratory malfunction arising from mental factors (1/3/2010); Sleep apnea; Vitamin d deficiency (8/28/2010); and Wheeze (12/4/2009). She also has no past medical history of Encounter for long-term (current) use of other medications.    I personally reviewed patient's problem list, allergies, medications, family hx, social hx with patient and update [facility].     REVIEW OF SYSTEMS:  CONSTITUTIONAL:  Denies night sweats, fatigue, malaise, lethargy, fever or chills.  RESPIRATORY:  Denies cough, wheeze, hemoptysis, or shortness of breath.  CARDIOVASCULAR:  Denies chest pains, palpitations, pedal edema     Objective:     Blood pressure 115/74, pulse (!) 110, temperature 37.4 °C (99.3 °F), height 1.657 m (5' 5.25\"), weight 108.3 kg (238 lb 12.8 oz), last menstrual period 06/04/2005, SpO2 97 %. Body mass index is 39.43 kg/m².    Physical Exam:  Constitutional: awake, alert, in no distress.  Skin: Warm, dry, good turgor, no rashes, bruises, " ulcers in visible areas.  Eye: conjunctiva clear, lids neg for edema or lesions.  ENMT: TM and auditory canals wnl. Inflamed mucosa wnl. Lips without lesions, good dentition, oropharynx clear.  Neck: Trachea midline, no masses, no thyromegaly. No cervical or supraclavicular lymphadenopathy  Respiratory: Unlabored respiratory effort, lungs clear to auscultation, mild wheezes and pleural rub at the right lower base, no rhonchi, no rales.  Cardiovascular: Normal S1, S2, no murmur, no pedal edema.  Psych: Oriented x3, affect and mood wnl, intact judgement and insight.       Assessment and Plan:   The following treatment plan was discussed    1. Mild persistent asthma without complication, Dr Borja  2. Bronchitis  History of mild persistent asthma, previously managed by Dr. Borja who recently retired. Patient presents with five-day history of upper respiratory tract symptoms. Exam is concerning for bronchitis. Rapid flu test was negative. Plans:  - albuterol 108 (90 Base) MCG/ACT Aero Soln inhalation aerosol; Inhale 2 Puffs by mouth every 6 hours as needed for Shortness of Breath.  Dispense: 8.5 g; Refill: 2  - Hydrocod Polst-CPM Polst ER (TUSSIONEX) 10-8 MG/5ML Suspension Extended Release; Take 5 mL by mouth every 12 hours for 10 days.  Dispense: 100 mL; Refill: 0  - azithromycin (ZITHROMAX) 250 MG Tab; Take 2 tablets on the first day and 1 tablet daily thereafter for 4 days.  Dispense: 6 Tab; Refill: 0  - Hydration, rest  - Follow-up when necessary    3. Prediabetes  Chronic, A1c was 6.1 in November 2017. This is an improvement from her previous A1c in July 2016 (6.3). Patient has been working on lifestyle modification inconsistently. She is obese, and continues experiencing weight gain. She is interested in [facility] improvement referral for weight loss and dietary counseling for prediabetes and hyperlipidemia.  - REFERRAL TO [facility] HEALTH IMPROVEMENT PROGRAMS (HIP) Services Requested: Registered Dietitian for  Medical Nutrition Therapy, Medicare Obesity Counseling, Medical Weight Management Program; Reason for Referral? BMI>30; Reason for Visit: Overweight/...    5. Essential hypertension  Chronic, well controlled with telmisartan, blood pressure 115/74 today, we'll continue with telmisartan 40 mg daily.    6. Pure hypercholesterolemia  Chronic hyperlipidemia, patient is resistant to statin in the past. She tried lifestyle modification without improvement. Her 10 year cardiovascular risk is greater than 10. I discussed the risks of chronic untreated hyperlipidemia on her cardiovascular health. Patient is interested in nutritional counseling. Plans:  - REFERRAL TO [facility] IMPROVEMENT PROGRAMS (HIP) Services Requested: Registered Dietitian for Medical Nutrition Therapy, Medicare Obesity Counseling, Medical Weight Management Program; Reason for Referral? BMI>30; Reason for Visit: Overweight/...    7. Obesity (BMI 30-39.9)  - REFERRAL TO [facility] IMPROVEMENT PROGRAMS (HIP) Services Requested: Registered Dietitian for Medical Nutrition Therapy, Medicare Obesity Counseling, Medical Weight Management Program; Reason for Referral? BMI>30; Reason for Visit: Overweight/...      Yessy Paz M.D.      Followup: Return in about 3 months (around 4/22/2018) for As needed, Annual wellness visit.    Please note that this dictation was created using voice recognition software. I have made every reasonable attempt to correct obvious errors, but I expect that there are errors of grammar and possibly content that I did not discover before finalizing the note.

## 2018-02-07 ENCOUNTER — OFFICE VISIT (OUTPATIENT)
Dept: HEALTH INFORMATION MANAGEMENT | Facility: MEDICAL CENTER | Age: 68
End: 2018-02-07
Payer: MEDICARE

## 2018-02-07 VITALS
DIASTOLIC BLOOD PRESSURE: 60 MMHG | TEMPERATURE: 97.8 F | WEIGHT: 238.4 LBS | BODY MASS INDEX: 39.72 KG/M2 | HEIGHT: 65 IN | OXYGEN SATURATION: 96 % | HEART RATE: 90 BPM | SYSTOLIC BLOOD PRESSURE: 122 MMHG

## 2018-02-07 DIAGNOSIS — I10 ESSENTIAL HYPERTENSION: ICD-10-CM

## 2018-02-07 DIAGNOSIS — E55.9 VITAMIN D INSUFFICIENCY: ICD-10-CM

## 2018-02-07 DIAGNOSIS — R73.03 PREDIABETES: ICD-10-CM

## 2018-02-07 DIAGNOSIS — E78.00 PURE HYPERCHOLESTEROLEMIA: ICD-10-CM

## 2018-02-07 DIAGNOSIS — G47.33 OBSTRUCTIVE SLEEP APNEA SYNDROME: ICD-10-CM

## 2018-02-07 DIAGNOSIS — E66.01 MORBID OBESITY WITH BODY MASS INDEX OF 40.0-44.9 IN ADULT (HCC): ICD-10-CM

## 2018-02-07 DIAGNOSIS — E88.810 METABOLIC SYNDROME: ICD-10-CM

## 2018-02-07 PROCEDURE — 99205 OFFICE O/P NEW HI 60 MIN: CPT | Mod: 25 | Performed by: INTERNAL MEDICINE

## 2018-02-07 PROCEDURE — 93000 ELECTROCARDIOGRAM COMPLETE: CPT | Performed by: INTERNAL MEDICINE

## 2018-02-07 NOTE — PROGRESS NOTES
Bariatric Medicine H&P  Chief Complaint   Patient presents with   • Weight Gain       Referred by:  Yessy Paz M.D.    History of Present Illness:   Lauren Esquivel is a 67 y.o.  female who presents for weight management and to help address co-morbidities related to overweight, including prediabetes, hyperglycemia, metabolic syndrome, hyperlipidemia, vitamin D deficiency, hypertension.    The patient presents upon referral from her primary care physician. Over the last 10 years, the patient is developing comorbidities related to her obesity. She had nephrolithiasis, broken ankle, hospitalized with the flu and has had asthma. She realizes she is not getting younger, needs to improve her health overall. She has 2 adopted children, who are 19 and 23, and she wants to live a long time to watch them grow up. Since she last met her primary care physician last year, she has lost about 10 pounds. However, she still feels short of breath and fatigued.     She continues on CPAP and vitamin D repletion. Her blood pressure is diet controlled, she is not on medication for hyperglycemia, hypertension or hyperlipidemia.    The patient states she had significant financial difficulties since the recession 10 years ago, slowly improving. She gets depressed at times. She has not been part of a formal weight loss program in many years, not since the 1990s when she tried Weight Watchers. She feels stressed, tries to manage it herself with portion control. She has not been on weight loss medications in the past.    Typical intake includes one egg, toast, fruit which is half a banana, susy or kiwi or berries. Sometimes she has bread with almond butter. For lunch she has leftovers or a sandwich. For dinner she has meat or fish and vegetables and salad, sometimes soup. Snacks include oranges, cookies but only one or 2. After dinner she has a piece of fruit or candy but only one piece. She states she does not snack much  "during the day. She cut out soft drinks 2 years ago. She drinks water and coffee for liquids. She mostly cooks herself.      Behavior-Related History:  Binge eating screen: Positive       Exercise:   None. Feels short of breath.    Life Style Changes:  Significant life stressors, including infertility, adopted children, financial difficulties.     Review of Systems   Fatigue, shortness of breath, depression and anxiety. Back pain, joint pain.  Sleep apnea screen: Positive, on CPAP  All other ROS were reviewed with patient today and are negative.      PMH/PSH:  I have reviewed the patient's medical, social and family history, allergies, and medications today.  Prior records reviewed.  Personal Hx of Bariatric Surgery: No      Physical Exam:   /60   Pulse 90   Temp 36.6 °C (97.8 °F)   Ht 1.651 m (5' 5\")   Wt 108.1 kg (238 lb 6.4 oz)   LMP 06/04/2005   SpO2 96%   BMI 39.67 kg/m²   Waist: 47 in  Body fat % 55.2  REE 1641 kcal/day    Constitutional: Oriented to person, place, and time and well-developed, well-nourished, and in no distress.    HENT: No facial plethora.  No Cushingoid features.  No scalloped tongue.  No dental erosions.  No swollen parotids.  Head: Normocephalic.   Eyes: EOM are normal. Pupils are equal, round, and reactive to light. No periorbital edema.  No lateral thinning of eyebrows.  No vertical nystagmus.  Neck: Normal range of motion. Neck supple. No thyromegaly present. No buffalo hump.  Cardiovascular: Normal rate and regular rhythm.  No murmur heard.  Pulmonary/Chest: Effort normal and breath sounds normal. No wheezes.   Abdominal: Soft. Bowel sounds are normal. No pannus.  No ascites.  No hepatosplenomegaly.  No red striae.  Musculoskeletal: Normal range of motion. No edema.   Neurological: Alert and oriented to person, place, and time. Normal reflexes. No cranial nerve deficit. No muscle weakness.  Gait normal.   Skin: Warm and dry. Not diaphoretic. No hirsuitism.  No acanthosis " nigricans.  Not excessively dry, scaly.  No acne.  No bruising/ecchymosis.  No hyperpigmentation.  No xanthomas or acrochordon.  No violaceous striae.  No keratosis pilaris.  Psychiatric: Mood, memory, and judgment normal.  very blunted affect.    Laboratory:   Prior labs reviewed.  EKG: Sinus rhythm, rate 73, no prolonged QT interval, no significant ST-T change.  Ordered and reviewed by me today.      ASSESSMENT/PLAN:  Body mass index is 39.67 kg/m².   Obesity Stage ([place]) 2; Class 2    1. Morbid obesity with body mass index of 40.0-44.9 in adult (CMS-HCC)     2. Prediabetes     3. Metabolic syndrome     4. Vitamin D insufficiency     5. Pure hypercholesterolemia     6. Essential hypertension     7. Obstructive sleep apnea syndrome       The patient has significant comorbidities related to her obesity. Reducing her carbohydrates, reducing her calorie intake and better tracking will help with her prediabetes, hypercholesterolemia and hypertension. We will continue to work with her, to suggest meal replacements, and reduce her intake as above, and get her to track her intake as well.    The patient and I have discussed at length and agree to the following recommendations, which are all addressing the above diagnoses:    Weight Goal: 5% wt loss at one month after start (pt goal weight is 180 lb)  Diet:Keep total calories under 1600 per day to start  Keep total carb intake under 100 g per day  Keep total protein intake above 100 gram per day  My Fitness Pal for tracking  64+ oz water per day  Physical Activity: Consider walking as tolerated or pulmonary rehabilitation  Risk level for moderate/vigorous exercise program: Moderate  New Rx: None.  Side Effects: Will review consent if applicable.  Behavior change: Mindful eating, better tracking  Follow-up: 2 weeks  Explore depression and anxiety further next visit.  IBT the PCP  Consider pulmonary rehabilitation to improve exercise tolerance, given shortness of breath  with asthma  Consider antidepressant.    Face to face time spent 60 minutes,  with >50% of time devoted to one on one counseling on weight management issues, as documented above.      Thank you for your referral!

## 2018-02-07 NOTE — PATIENT INSTRUCTIONS
Keep total calories under 1600 per day to start  Keep total carb intake under 100 g per day  Keep total protein intake above 100 gram per day  My Fitness Pal for tracking  64+ oz water per day

## 2018-02-15 ENCOUNTER — NON-PROVIDER VISIT (OUTPATIENT)
Dept: MEDICAL GROUP | Age: 68
End: 2018-02-15
Payer: MEDICARE

## 2018-02-15 VITALS — WEIGHT: 241.4 LBS | BODY MASS INDEX: 40.22 KG/M2 | HEIGHT: 65 IN

## 2018-02-16 NOTE — PROGRESS NOTES
"IBT INITIAL ASSESMENT    Author: Mary Beth Camacho Date & Time created: 2/15/2018  4:22 PM   Visit #: 1  Referring Provider: Rossy Bates*  Patient Age: 67 y.o.  Time in/Out:   3:09-3:20pm     ASSESS:  Vitals:    02/15/18 1616   Weight: 109.5 kg (241 lb 6.4 oz)   Height: 1.651 m (5' 5\")      Vitals:    02/15/18 1616   Weight: 109.5 kg (241 lb 6.4 oz)   Height: 1.651 m (5' 5\")    Body mass index is 40.17 kg/m².   Goal Weight:   180lb     The patient states Health and well being as motivators for weight loss and has tried other diets for weight loss in the past with out lasting success.    Comorbidities include HTN, HLD, asthma, Pre-Dm      Current Dietary/Exercise Habits  1.  How many servings of fruits and vegetables do you eat in a typical day?  A few each   2. How many servings of whole grains do you eat in a typical day?  About half   3. How many times in a typical week do you eat foods high in fat or eat at a fast food restaurant?   A couple times a week   4. How many times in a typical week do you eat red meat, pork, or processed meat?  3+   5. How many days a week do you participate in moderately intense physical activity for 30 minutes?  None   6. How many days a week do you participate in vigorously intense physical activity for 20 minutes?  None   7. How many days a week do you do strength training exercise?  None   8. How many meals and snacks do you eat in a typical day? 3 meals and 1-2 snacks   9. Nutrition History/other: Pt has not yet started to track on my fitness pal. She has not yet made any changes to her diet. Has been busy and stressed.     Estimated Stage of Change   Contemplation as evidenced by discussion with pt.    ADVISE:    Physical Activity:  Not discussed today.      Dietary Guidelines:  Today we discussed briefly some guidelines for the plate method and portion control. Also encouraged pt to reduce her intake of processed carbs and sweets. Pt was late to the appointment so there " was limited time to provide recommendations. Suggested she keep a hand written food journal if she cant get my fitness pal working.     The patient has been advised of how weight management and physical activity impacts their health and will help to reduce complications and health risk factors.    AGREE:  Goals:   1. Keep a 3-5 day food diary of all foods/drinks consumed, the amount you consumed (approximately), and the time it was when consumed.   2. Use plate method for portion control   3. Avoid refined grains and sweets     ASSIST:  Pt was late for the appt today so we had only about 10 mins to discuss nutrition guidelines. Next vist we will spend more time discussing meal plan and guidelines. Dr. Ingram would like pt to consume <1600kcal and <100g carbs per day with >100g protein per day.     ARRANGE:     Return for follow-up in  2 weeks    The patient was assisted in making follow-up appointment per orders.

## 2018-02-23 DIAGNOSIS — E66.01 MORBID OBESITY WITH BODY MASS INDEX OF 40.0-44.9 IN ADULT (HCC): ICD-10-CM

## 2018-02-27 ENCOUNTER — OFFICE VISIT (OUTPATIENT)
Dept: HEALTH INFORMATION MANAGEMENT | Facility: MEDICAL CENTER | Age: 68
End: 2018-02-27
Payer: MEDICARE

## 2018-02-27 VITALS
WEIGHT: 236.8 LBS | HEART RATE: 75 BPM | HEIGHT: 65 IN | TEMPERATURE: 98.9 F | SYSTOLIC BLOOD PRESSURE: 118 MMHG | DIASTOLIC BLOOD PRESSURE: 74 MMHG | OXYGEN SATURATION: 94 % | BODY MASS INDEX: 39.45 KG/M2

## 2018-02-27 DIAGNOSIS — G47.33 OBSTRUCTIVE SLEEP APNEA SYNDROME: ICD-10-CM

## 2018-02-27 DIAGNOSIS — E66.01 MORBID OBESITY WITH BODY MASS INDEX OF 40.0-44.9 IN ADULT (HCC): ICD-10-CM

## 2018-02-27 DIAGNOSIS — J45.30 MILD PERSISTENT ASTHMA WITHOUT COMPLICATION: ICD-10-CM

## 2018-02-27 DIAGNOSIS — E88.810 METABOLIC SYNDROME: ICD-10-CM

## 2018-02-27 DIAGNOSIS — R73.03 PREDIABETES: ICD-10-CM

## 2018-02-27 DIAGNOSIS — E78.00 PURE HYPERCHOLESTEROLEMIA: ICD-10-CM

## 2018-02-27 DIAGNOSIS — I10 ESSENTIAL HYPERTENSION: ICD-10-CM

## 2018-02-27 PROCEDURE — 99213 OFFICE O/P EST LOW 20 MIN: CPT | Performed by: INTERNAL MEDICINE

## 2018-02-28 ENCOUNTER — APPOINTMENT (OUTPATIENT)
Dept: HEALTH INFORMATION MANAGEMENT | Facility: MEDICAL CENTER | Age: 68
End: 2018-02-28
Payer: MEDICARE

## 2018-02-28 NOTE — PROGRESS NOTES
Bariatric Medicine Follow Up  Chief Complaint   Patient presents with   • Weight Gain       History of Present Illness:   Lauren Esquivel is a 67 y.o. female who presents for weight management follow-up and to help address co-morbidities related to overweight, including prediabetes, metabolic syndrome, hyperlipidemia, vitamin D deficiency, hypertension.    During the patient's last visit, the following were discussed and recommended:  Weight Goal: 5% wt loss at one month after start (pt goal weight is 180 lb)  Diet:Keep total calories under 1600 per day to start  Keep total carb intake under 100 g per day  Keep total protein intake above 100 gram per day  My Fitness Pal for tracking  64+ oz water per day  Physical Activity: Consider walking as tolerated or pulmonary rehabilitation  Risk level for moderate/vigorous exercise program: Moderate  New Rx: None.  Side Effects: Will review consent if applicable.  Behavior change: Mindful eating, better tracking  Follow-up: 2 weeks  Explore depression and anxiety further next visit.  IBT via PCP  Consider pulmonary rehabilitation to improve exercise tolerance, given shortness of breath with asthma  Consider antidepressant.    The patient has begun to make some changes, trying to keep her carbohydrate intake down. She is not yet tracking, has not been able to get her phone synced to use MAZ. Wants to try 1 Robard meal replacement daily for lunch or dinner. Did meet with Mary Beth, the dietitian, has not yet made any significant changes since that visit. Seems to have a lot of stress at home, especially with her teenage daughter.    Wants to start exercising more, is going to look into the [employer] [place] gym, as she finds in the winter she does not walk enough or get much exercise.     Not interested in weight loss medications at this time. Does not like that she is taking so many medications. Continues on vitamin D repletion. Not on a statin. Blood pressure is diet  "controlled. Not tracking blood glucoses.  Shortness of breath has improved somewhat, not interested in pulmonary rehabilitation.    Exercise:   None. Considering [employer] gym.     Review of Systems   Denies significant depressive symptoms, anxiety at this time.  All other ROS were reviewed and are otherwise unchanged from my previous visit with patient.    Physical Exam:    /74   Pulse 75   Temp 37.2 °C (98.9 °F)   Ht 1.651 m (5' 5\")   Wt 107.4 kg (236 lb 12.8 oz)   LMP 06/04/2005   SpO2 94%   BMI 39.41 kg/m²    Waist: 45.5 in  Body fat % 54.6  REE 1640 kcal/day    Weight change since last visit: -1.6 lb   Waist Circum change since last visit: -1.5 in     Constitutional: Oriented to person, place, and time and well-developed, well-nourished, and in no distress.    Head: Normocephalic.   Musculoskeletal: Normal range of motion. No edema.   Neurological: Alert and oriented to person, place, and time. No muscle weakness.  Gait normal.   Skin: Warm and dry. Not diaphoretic.   Psychiatric: Mood, memory, affect and judgment normal.     Laboratory:   None new.      ASSESSMENT/PLAN:  Body mass index is 39.41 kg/m².    Obesity Stage ([place]):  2; Class 2    1. Morbid obesity with body mass index of 40.0-44.9 in adult (CMS-HCC)     2. Prediabetes     3. Obstructive sleep apnea syndrome     4. Essential hypertension     5. Pure hypercholesterolemia     6. Metabolic syndrome     7. Mild persistent asthma without complication, Dr Borja       The patient has begun to make small changes, has reduced her waist circumference. My fitness pal tracking should help keep her carbohydrate intake down, but she has yet to make that change. Blood pressure currently controlled. We will continue to monitor her blood sugars, lipids as she progresses through the program. Seems eager to make some changes with regard to exercise.    The patient and I have discussed at length and agree to the following recommendations, which are " all addressing the above diagnoses:    Weight Goal: 3-5% wt loss each month (pt goal weight is 180 lb)  Diet:  Keep total calories under 1600 per day to start  Keep total carb intake under 100 g per day  Keep total protein intake above 100 gram per day  My Fitness Pal for tracking  64+ oz water per day  One Robard meal replacement at lunch or dinner daily  Physical Activity: Look into BMdr  Risk level for moderate/vigorous exercise program: Low to moderate  New Rx: None. Patient not interested in weight loss medications at this time  Side Effects: Will review consent if applicable.  Behavior change: Mindful eating, tracking. Continue to assess patient's readiness to change.  Follow-up: One month  IBT follow-up in 2 weeks with dietitian

## 2018-03-15 ENCOUNTER — NON-PROVIDER VISIT (OUTPATIENT)
Dept: MEDICAL GROUP | Age: 68
End: 2018-03-15
Payer: MEDICARE

## 2018-03-15 VITALS — WEIGHT: 240.8 LBS | BODY MASS INDEX: 40.12 KG/M2 | HEIGHT: 65 IN

## 2018-03-15 PROCEDURE — G0447 BEHAVIOR COUNSEL OBESITY 15M: HCPCS | Performed by: FAMILY MEDICINE

## 2018-03-15 NOTE — PROGRESS NOTES
"3/15/2018     Visit #2      Referring Provider: Dileep Bates 67 y.o.           Time in/Out:   2:00-2:35pm     ASSESS:    Vitals:    03/15/18 1400   Weight: 109.2 kg (240 lb 12.8 oz)   Height: 1.651 m (5' 5\")            Wt Readings from Last 2 Encounters:   03/15/18 109.2 kg (240 lb 12.8 oz)   02/27/18 107.4 kg (236 lb 12.8 oz)            Body mass index is 40.07 kg/m².       Weight change since last visit:   - 0.6lb     Starting weight:   241.4lb       Total weight change:   -0.6lb      Current Dietary/Exercise Habits:   Pt states she had to travel to [place] last week to be with her sister in law who was just diagnosed with advanced cancer. Therefore she was eating out often and did have some fast food. She has not been tracking her food journal either.     Estimated Stage of Change:  Preparation as evidenced by planning to work on her diet better.      ADVISE:    Physical Activity:   Not discussed today      Dietary Guidelines:   Today we reviewed the meal plan in more detail. Went over meal guidelines (plate method with up to 1/2 cup starch at meals), snack guidelines (1oz protein + ns veggies or 1 fruit), and also meal ideas and recipes     The patient has been advised of how weight management and physical activity impacts their health and will help to reduce complications and health risk factors.        AGREE:  Previous Goals:   1. Keep a 3-5 day food diary of all foods/drinks consumed, the amount you consumed (approximately), and the time it was when consumed.   2. Use plate method for portion control   3. Avoid refined grains and sweets     New Goals:   1. Keep a food journal - hand written or manuela on phone   2. Use plate method for portion control and macronutrient balance   3. Limit carbs to <100g per day by limiting fruit to 2 servings a day and up to 1/2 cup per meal   4. Try meal planning and prepping some meals ahead of time (egg and veggies frittata for example) " "      ASSIST:  Lauren has not been able to follow through with the meal plan and is not using my fitness pal. She hasnt gotten her apple ID to work so she may just do a hand written journal to start with for mindfulness. We were finn to take time today to go over a meal schedule and meal ideas to help get her back on track.   Down the road we will need to also discuss tips for healthier dinning out. Pt eats out often and has \"things come up\" often.       ARRANGE:     Return for follow-up in 4 weeks      The patient was assisted in making follow-up appointment per orders.   "

## 2018-04-02 RX ORDER — TELMISARTAN 40 MG/1
TABLET ORAL
Qty: 90 TAB | Refills: 1 | Status: SHIPPED | OUTPATIENT
Start: 2018-04-02 | End: 2018-09-18 | Stop reason: SDUPTHER

## 2018-04-04 ENCOUNTER — APPOINTMENT (OUTPATIENT)
Dept: HEALTH INFORMATION MANAGEMENT | Facility: MEDICAL CENTER | Age: 68
End: 2018-04-04
Payer: MEDICARE

## 2018-04-19 ENCOUNTER — NON-PROVIDER VISIT (OUTPATIENT)
Dept: MEDICAL GROUP | Age: 68
End: 2018-04-19
Payer: MEDICARE

## 2018-04-19 VITALS — BODY MASS INDEX: 39.79 KG/M2 | HEIGHT: 65 IN | WEIGHT: 238.8 LBS

## 2018-04-19 PROCEDURE — G0447 BEHAVIOR COUNSEL OBESITY 15M: HCPCS | Performed by: FAMILY MEDICINE

## 2018-04-19 NOTE — PROGRESS NOTES
"2018     Visit #3      Referring Provider: Yessy Paz M.D.        Lauren Marinapas 67 y.o.           Time in/Out:  2:00-2:20pm      ASSESS:    Vitals:    18 1424   Weight: 108.3 kg (238 lb 12.8 oz)   Height: 1.651 m (5' 5\")            Wt Readings from Last 2 Encounters:   18 108.3 kg (238 lb 12.8 oz)   03/15/18 109.2 kg (240 lb 12.8 oz)            Body mass index is 39.74 kg/m².       Weight change since last visit:   -2lb      Starting weight:   241.4lb       Total weight change:  -2.6lb     Current Dietary/Exercise Habits:  Pt has been out of town for the past week in [place] for her sister in laws . She has found it difficult to stick with the meal plan and eat healthy while out of town and with all the stress involved. Now that she is home however she is motivated to get back on track and start exercising and preparing healthier meals at home. While she was gone she did try to reduce her portions and make healthier choices some of the time.     Estimated Stage of Change:  Contemplation as evidenced by setting goals but not yet initiating any change to implement them.       ADVISE:    Physical Activity:   Pt plans to start walking or biking on recumbent bike 15mins every other day.      Dietary Guidelines:   Today we reviewed the meal plan guidelines and worked with pt to set goals to help her get back on track. She feels she knows what she needs to do. She hasnt started the food journal. And hasnt been grocery shopping yet. But plans to start meal planning and eating more vegetables and whole foods and less sweets and processed foods.     The patient has been advised of how weight management and physical activity impacts their health and will help to reduce complications and health risk factors.        AGREE:  Previous Goals:   1. Keep a food journal - hand written or manuela on phone   2. Use plate method for portion control and macronutrient balance   3. Limit carbs to <100g per day by " limiting fruit to 2 servings a day and up to 1/2 cup per meal   4. Try meal planning and prepping some meals ahead of time (egg and veggies frittata for example)     New Goals:   1. Continue previous goals  2. No sweets / dessert       ASSIST:  Lauren has had a difficult time with starting this program. She has been traveling a lot and had some life events and tragedies. However she is here today which shows she is ready to make some committment to lose weight. It seems she wants to lose weight, but is not yet able to apply the diet changes. She is going to look for the meal replacement beverages and try those, as well as start the food journal manuela. However she continues to state verbal goals without following through with action.       ARRANGE:     Return for follow-up in 2-3 weeks      The patient was assisted in making follow-up appointment per orders.

## 2018-05-10 ENCOUNTER — NON-PROVIDER VISIT (OUTPATIENT)
Dept: MEDICAL GROUP | Age: 68
End: 2018-05-10
Payer: MEDICARE

## 2018-05-10 VITALS — WEIGHT: 237.2 LBS | BODY MASS INDEX: 39.52 KG/M2 | HEIGHT: 65 IN

## 2018-05-10 PROCEDURE — G0447 BEHAVIOR COUNSEL OBESITY 15M: HCPCS | Performed by: FAMILY MEDICINE

## 2018-05-10 NOTE — PROGRESS NOTES
"5/10/2018     Visit # 4      Referring Provider: Yessy Paz M.D.        Lauren Parr Alvin 67 y.o.           Time in/Out:   1:04-1:22pm     ASSESS:    Vitals:    05/10/18 1321   Weight: 107.6 kg (237 lb 3.2 oz)   Height: 1.651 m (5' 5\")            Wt Readings from Last 2 Encounters:   05/10/18 107.6 kg (237 lb 3.2 oz)   04/19/18 108.3 kg (238 lb 12.8 oz)            Body mass index is 39.47 kg/m².       Weight change since last visit:  -1.6lb     Starting weight:  241.4lb      Total weight change:  -3.9lb     Current Dietary/Exercise Habits:  Pt has been busy taking care of her  after an invasive oral surgery. He can only eat purred foods and liquids. She has been worried about him on the pain medications and is up with him at night. Therefore she has not wanted to leave the house to exercise. Now that he is off oxycodone she feels she can go for walks. She is planning to start preparing more salads for herself. And has been avoiding sweets /desserts    Estimated Stage of Change:  Preparation as evidenced by discussion w/ pt.      ADVISE:    Physical Activity:   Pt plans to start walking     Dietary Guidelines:  Discussed simple meal ideas pt can do while her  is eating pureed diet. His is slowly able to eat more foods which is helpful . But she doesn't not want to have to cook 2 separate meals each day. She is using rotisserie chickens, eggs, ground turkey and fish for soft proteins. And will continue to include vegetables from salad or steamed at both lunch and dinner. Is having eggs and avocado or sour cream for breakfast    The patient has been advised of how weight management and physical activity impacts their health and will help to reduce complications and health risk factors.        AGREE:  Previous Goals:   1. Keep a food journal - hand written or manuela on phone   2. Use plate method for portion control and macronutrient balance   3. Limit carbs to <100g per day by limiting fruit to 2 " servings a day and up to 1/2 cup per meal   4. Try meal planning and prepping some meals ahead of time (egg and veggies frittata for example)   5. Avoid sweets/ added sugars     New Goals:   1. Continue previous goals.  2. Start walking 3 days per week       ASSIST:  Pt has only lost about 4lb however she has had some stressful life events during this time. With the passing of a family member and taking care of her  after an invasive oral surgery. She has done her best and is motivated to continue in hopes that as things get less stressful, she can focus more on her diet and exercise. She feels she know what she needs to do but just needs to do it.       ARRANGE:     Return for follow-up in 2 weeks      The patient was assisted in making follow-up appointment per orders.

## 2018-05-24 ENCOUNTER — APPOINTMENT (OUTPATIENT)
Dept: MEDICAL GROUP | Age: 68
End: 2018-05-24
Payer: MEDICARE

## 2018-05-31 ENCOUNTER — NON-PROVIDER VISIT (OUTPATIENT)
Dept: MEDICAL GROUP | Age: 68
End: 2018-05-31
Payer: MEDICARE

## 2018-05-31 VITALS — BODY MASS INDEX: 39.42 KG/M2 | HEIGHT: 65 IN | WEIGHT: 236.6 LBS

## 2018-05-31 PROCEDURE — G0447 BEHAVIOR COUNSEL OBESITY 15M: HCPCS | Performed by: FAMILY MEDICINE

## 2018-05-31 NOTE — PROGRESS NOTES
"5/31/2018     Visit #5     Referring Provider: Dileep Bates 67 y.o.           Time in/Out:  2:50-3:00pm     ASSESS:    Vitals:    05/31/18 1451   Weight: 107.3 kg (236 lb 9.6 oz)   Height: 1.651 m (5' 5\")            Wt Readings from Last 2 Encounters:   05/31/18 107.3 kg (236 lb 9.6 oz)   05/10/18 107.6 kg (237 lb 3.2 oz)            Body mass index is 39.37 kg/m².       Weight change since last visit:   -0.6lb      Starting weight:  241.4lb      Total weight change:  -4.8lb     Current Dietary/Exercise Habits:  Pt has been walking and biking every other day which is increased from previous visits. She is watching her portions, however she feels she could be more mindful of her food choices and needs to prioritize. She is painting her house and her  is still recovering from oral surgery.     Estimated Stage of Change:  Preparation as evidenced by setting goals .      ADVISE:    Physical Activity:  Continue to exercise every other day and increase as able      Dietary Guidelines:   Recommended that pt try food journaling to help with mindful eating. Also suggested meal planning to help prioritize her diet over all of life's distractions.     The patient has been advised of how weight management and physical activity impacts their health and will help to reduce complications and health risk factors.        AGREE:  Previous Goals:   1. Keep a food journal - hand written or manuela on phone   2. Use plate method for portion control and macronutrient balance   3. Limit carbs to <100g per day by limiting fruit to 2 servings a day and up to 1/2 cup per meal   4. Try meal planning and prepping some meals ahead of time (egg and veggies frittata for example)   5. Avoid sweets/ added sugars     New Goals:   1. Keep food journal  2. Exercise every other day   3. Try meal planning ahead for the week       ASSIST:  Pt has made some small healthy changes to her eating pattern however she " would benefit from focusing more on quality in addition to quantity. Next visit we will review her food journal or at least her diet hx for more specific suggestions. Reviewed the plate method today and discussed importance of macronutrient balance at meals.       ARRANGE:     Return for follow-up in 2 weeks      The patient was assisted in making follow-up appointment per orders.

## 2018-06-14 ENCOUNTER — APPOINTMENT (OUTPATIENT)
Dept: MEDICAL GROUP | Age: 68
End: 2018-06-14
Payer: MEDICARE

## 2018-06-21 ENCOUNTER — NON-PROVIDER VISIT (OUTPATIENT)
Dept: MEDICAL GROUP | Age: 68
End: 2018-06-21
Payer: MEDICARE

## 2018-06-21 VITALS — BODY MASS INDEX: 39.62 KG/M2 | WEIGHT: 237.8 LBS | HEIGHT: 65 IN

## 2018-06-21 PROCEDURE — G0447 BEHAVIOR COUNSEL OBESITY 15M: HCPCS | Performed by: FAMILY MEDICINE

## 2018-06-21 NOTE — PROGRESS NOTES
"6/21/2018     Visit # 6      Referring Provider: Yessy Paz M.D.        Lauren Parr Alvin 67 y.o.           Time in/Out:   3:00-3:25pm     ASSESS:    Vitals:    06/21/18 1506   Weight: 107.9 kg (237 lb 12.8 oz)   Height: 1.651 m (5' 5\")            Wt Readings from Last 2 Encounters:   06/21/18 107.9 kg (237 lb 12.8 oz)   05/31/18 107.3 kg (236 lb 9.6 oz)            Body mass index is 39.57 kg/m².       Weight change since last visit:  +1.2lb     Starting weight:  241.4lb     Total weight change:  -3.6lb     Current Dietary/Exercise Habits:  Pt states she has been stressed still and there is a lot going on in her life. She finds that lack of time is the biggest barrier to her weight loss efforts. She has not been walking or exercising either. Cooks for 5 people in her household and finds it difficult to prepare something healthy that everyone will like. She had purchased meal replacements from Dr. Ingram who she is no longer seeing, but hasnt tried them yet. Keep a food journal a few days but not complete.     Estimated Stage of Change:  Contemplation as evidenced by discussion w/ pt; having multiple barriers to change.      ADVISE:    Physical Activity:  Not walking and will try to resume as able.      Dietary Guidelines:  Suggested she use the MR beverages and soup from [employer] for 1 meal a day; either lunch or dinner since time has been the most difficult part of this weight loss effort. Suggested premier protein or muscle milk 25g-30g protein drink for when she runs out of the [employer] product. Recommended she eat 4oz protein + ns veggies at her other meal and leave out the carbs to keep it simple. For breakfast she will continue eggs with fruit or almond butter on whole grain bread.     The patient has been advised of how weight management and physical activity impacts their health and will help to reduce complications and health risk factors.        AGREE:  Previous Goals:   1. Keep food journal  2. " Exercise every other day   3. Try meal planning ahead for the week     New Goals:   1. Use 1 MR a day for lunch or dinner   2. At other meal have 4oz protein and ns veggies without the starch for now   3. Resume walking   4. Consider My Fitness Pal and aim for <1500-1600kcal/day       ASSIST:  Lauren has had a difficult time committing to any lifestyle changes and sticking with them. She is thinking more overall about what she is eating but still slips back in to many bad habits. To keep things simple, suggested she try 1 MR a day and eliminate starch/carb foods from her other meal and continue breakfast the same. Also suggested she try my fitness pal which would help teach her more about what is in the foods she eats, but she may not have time to dedicate to this.       ARRANGE:     Return for follow-up in 2 weeks      The patient was assisted in making follow-up appointment per orders.

## 2018-07-05 ENCOUNTER — NON-PROVIDER VISIT (OUTPATIENT)
Dept: MEDICAL GROUP | Age: 68
End: 2018-07-05
Payer: MEDICARE

## 2018-07-05 VITALS — WEIGHT: 237 LBS | HEIGHT: 65 IN | BODY MASS INDEX: 39.49 KG/M2

## 2018-07-05 PROCEDURE — G0447 BEHAVIOR COUNSEL OBESITY 15M: HCPCS | Performed by: INTERNAL MEDICINE

## 2018-07-05 NOTE — PROGRESS NOTES
"7/5/2018     Visit #7       Yessy Paz M.D.      67 y.o.           Time in/Out:  300-319pm    ASSESS:    Vitals:    07/05/18 1615   Weight: 107.5 kg (237 lb)   Height: 1.651 m (5' 5\")            Wt Readings from Last 2 Encounters:   07/05/18 107.5 kg (237 lb)   06/21/18 107.9 kg (237 lb 12.8 oz)            Body mass index is 39.44 kg/m².       Difference:  -0.8 lbs     Starting weight:  241.1 lbs     Difference:  - 4.1 lbs     Current Dietary/Exercise Habits:  Lauren states that she is a \"slow loser\". She has barriers to exercise that include being very busy at home, family obligations, and knee pain. After stating her barriers, Lauren notes that \"these are just excuses\" though. Has been using Muscle Milk as a meal replacement for lunch every so often, finds these to be very helpful in keeping her from reaching for less healthy higher calorie foods when she is busy. They also keep her full and satisfied.     Estimated Stage of Change:    Contemplation as evidenced by stating her barriers are just excuses, but not yet ready to put an action plan for exercise into place.      ADVISE:      Physical Activity:  Lauren reports a lot of barriers, but realizes, they are more like excuses when she thinks about it. She likes riding the stationary bike, but currently it is out of sight and she forgets about it. She thought putting a note on her mirror might be a helpful reminder. Encouraged her to try moving it to a more convenient place or setting an alarm on her phone during a time she feels she could use it. Reminded her that \"some is better than none, more is better than some\". Recommended she start small with even just 3-5 minutes and then can build from there.      Dietary Guidelines:  The protein shake has been very helpful for Lauren. Suggested she increase the frequency she uses them since she likes them and they keep her full, they help cut back on her caloric intake, and they are convenient with her busy schedule. " "    The patient has been advised of how weight management and physical activity impacts their health and will help to reduce complications and health risk factors.        AGREE:  Previous Goals:   1. Use 1 MR a day for lunch or dinner   2. At other meal have 4oz protein and ns veggies without the starch for now   3. Resume walking   4. Consider My Fitness Pal and aim for <1500-1600kcal/day      New Goals:  1. Try riding bike as discussed and setting an alarm  2. Increase frequency of meal replacements to at least once every other day    ASSIST:  Today's visit was dedicated to getting to know the pt as she is new to me. She remains in the contemplative stage of change, lacking the motivation and confidence to perform new behaviors. I think the meal replacement if used more frequently could be a great tool to help her lose weight. If she starts to see some more weight loss, she may be more open to making change in other areas of her diet and lifestyle. Lauren is very hesitant when it comes to exercise - discussed breaking it down into \"moving more\" and letting go of workout expectations.        ARRANGE:     Return for follow-up in 2 weeks.      The patient was assisted in making follow-up appointment per orders.  "

## 2018-07-19 ENCOUNTER — APPOINTMENT (OUTPATIENT)
Dept: MEDICAL GROUP | Age: 68
End: 2018-07-19
Payer: MEDICARE

## 2018-08-31 ENCOUNTER — TELEPHONE (OUTPATIENT)
Dept: MEDICAL GROUP | Age: 68
End: 2018-08-31

## 2018-08-31 NOTE — TELEPHONE ENCOUNTER
· CPAP paperwork received from [facility] requiring provider signature.     · All appropriate fields completed by Medical Assistant: Yes    · Paperwork handed to provider to sign then faxed to [phone]

## 2018-08-31 NOTE — TELEPHONE ENCOUNTER
"· order paperwork received from  [facility]  requiring provider signature.     · All appropriate fields completed by Medical Assistant: Yes    · Paperwork placed in \"MA to Provider\" folder/basket. Awaiting provider completion/signature. Dr. Paz   "

## 2018-08-31 NOTE — TELEPHONE ENCOUNTER
Future Appointments       Provider Department Center    9/18/2018 2:00 PM Yessy Paz M.D.; [facility], located within [facility] [address]        ANNUAL WELLNESS VISIT PRE-VISIT PLANNING WITHOUT OUTREACH    1.  Reviewed note from last office visit with PCP: YES    2.  If any orders were placed at last visit, do we have Results/Consult Notes?        •  Labs - Labs were not ordered at last office visit.  Note: If patient appointment is for lab review and patient did not complete labs, check with provider if OK to reschedule patient until labs completed.       •  Imaging - Imaging was not ordered at last office visit.       •  Referrals - No referrals were ordered at last office visit.    3.  Immunizations were updated in Epic using WebIZ?: Epic matches WebIZ       •  WebIZ Recommendations: PNEUMOVAX (PPSV23), TDAP and SHINGRIX (Shingles)        •  Is patient due for Tdap? YES. Patient was notified of copay/out of pocket cost.       •  Is patient due for Shingles? YES. Patient was notified of copay/out of pocket cost.     4.  Patient is due for the following Health Maintenance Topics:   Health Maintenance Due   Topic Date Due   • IMM ZOSTER VACCINES (1 of 2) 08/16/1999   • IMM DTaP/Tdap/Td Vaccine (1 - Tdap) 08/10/2008   • IMM PNEUMOCOCCAL 65+ (ADULT) LOW/MEDIUM RISK SERIES (2 of 2 - PPSV23) 01/22/2017   • MAMMOGRAM  08/25/2018   • Annual Wellness Visit  08/30/2018   • IMM INFLUENZA (1) 09/01/2018           5.  Reviewed/Updated the following with patient:       •   Preferred Pharmacy? YES       •   Preferred Lab? YES       •   Preferred Communication? YES       •   Allergies? YES       •   Medications? YES. Was Abstract Encounter opened and chart updated? YES       •   Social History? YES. Was Abstract Encounter opened and chart updated? YES       •   Family History (document living status of immediate family members and if + hx of  cancer, diabetes, hypertension, hyperlipidemia, heart attack, stroke)  YES. Was Abstract Encounter opened and chart updated? YES    6.  Care Team Updated:       •   DME Company (gait device, O2, CPAP, etc.): YES       •   Other Specialists (eye doctor, derm, GYN, cardiology, endo, etc): YES    7.  Patient has the following Care Path diagnoses on Problem List:  NONE    8.  MDX printed and highlighted for Provider? NO    9.  Patient was advised: “This is a free wellness visit. The provider will screen for medical conditions to help you stay healthy. If you have other concerns to address you may be asked to discuss these at a separate visit or there may be an additional fee.”     10.  Patient was informed to arrive 15 min prior to their scheduled appointment and bring in their medication bottles.

## 2018-09-18 ENCOUNTER — OFFICE VISIT (OUTPATIENT)
Dept: MEDICAL GROUP | Age: 68
End: 2018-09-18
Payer: MEDICARE

## 2018-09-18 VITALS
SYSTOLIC BLOOD PRESSURE: 138 MMHG | WEIGHT: 234 LBS | DIASTOLIC BLOOD PRESSURE: 78 MMHG | TEMPERATURE: 99 F | BODY MASS INDEX: 38.99 KG/M2 | OXYGEN SATURATION: 91 % | HEART RATE: 78 BPM | HEIGHT: 65 IN

## 2018-09-18 DIAGNOSIS — Z00.00 MEDICARE ANNUAL WELLNESS VISIT, SUBSEQUENT: ICD-10-CM

## 2018-09-18 DIAGNOSIS — H91.93 HEARING REDUCED, BILATERAL: ICD-10-CM

## 2018-09-18 DIAGNOSIS — Z86.2 H/O: IRON DEFICIENCY ANEMIA: ICD-10-CM

## 2018-09-18 DIAGNOSIS — E55.9 VITAMIN D INSUFFICIENCY: ICD-10-CM

## 2018-09-18 DIAGNOSIS — E66.9 OBESITY (BMI 30-39.9): ICD-10-CM

## 2018-09-18 DIAGNOSIS — I10 ESSENTIAL HYPERTENSION: ICD-10-CM

## 2018-09-18 DIAGNOSIS — J45.30 MILD PERSISTENT ASTHMA WITHOUT COMPLICATION: ICD-10-CM

## 2018-09-18 DIAGNOSIS — Z23 NEED FOR VACCINATION: ICD-10-CM

## 2018-09-18 DIAGNOSIS — R73.03 PREDIABETES: ICD-10-CM

## 2018-09-18 DIAGNOSIS — Z87.442 HISTORY OF NEPHROLITHIASIS: ICD-10-CM

## 2018-09-18 DIAGNOSIS — G47.33 OSA ON CPAP: ICD-10-CM

## 2018-09-18 DIAGNOSIS — E78.00 PURE HYPERCHOLESTEROLEMIA: ICD-10-CM

## 2018-09-18 DIAGNOSIS — Z12.31 ENCOUNTER FOR SCREENING MAMMOGRAM FOR BREAST CANCER: ICD-10-CM

## 2018-09-18 PROCEDURE — G0009 ADMIN PNEUMOCOCCAL VACCINE: HCPCS | Performed by: FAMILY MEDICINE

## 2018-09-18 PROCEDURE — G0008 ADMIN INFLUENZA VIRUS VAC: HCPCS | Performed by: FAMILY MEDICINE

## 2018-09-18 PROCEDURE — 90732 PPSV23 VACC 2 YRS+ SUBQ/IM: CPT | Performed by: FAMILY MEDICINE

## 2018-09-18 PROCEDURE — G0439 PPPS, SUBSEQ VISIT: HCPCS | Mod: 25 | Performed by: FAMILY MEDICINE

## 2018-09-18 PROCEDURE — 90662 IIV NO PRSV INCREASED AG IM: CPT | Performed by: FAMILY MEDICINE

## 2018-09-18 RX ORDER — ALBUTEROL SULFATE 90 UG/1
2 AEROSOL, METERED RESPIRATORY (INHALATION) EVERY 6 HOURS PRN
Qty: 8.5 G | Refills: 2 | Status: SHIPPED | OUTPATIENT
Start: 2018-09-18

## 2018-09-18 RX ORDER — TELMISARTAN 40 MG/1
40 TABLET ORAL DAILY
Qty: 90 TAB | Refills: 3 | Status: SHIPPED | OUTPATIENT
Start: 2018-09-18 | End: 2019-10-03 | Stop reason: SDUPTHER

## 2018-09-18 ASSESSMENT — PATIENT HEALTH QUESTIONNAIRE - PHQ9: CLINICAL INTERPRETATION OF PHQ2 SCORE: 0

## 2018-09-18 ASSESSMENT — ENCOUNTER SYMPTOMS: GENERAL WELL-BEING: GOOD

## 2018-09-18 ASSESSMENT — ACTIVITIES OF DAILY LIVING (ADL): BATHING_REQUIRES_ASSISTANCE: 0
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
## 2018-10-12 LAB
ALBUMIN SERPL-MCNC: 4.4 G/DL (ref 3.6–4.8)
ALBUMIN/GLOB SERPL: 1.7 {RATIO} (ref 1.2–2.2)
ALP SERPL-CCNC: 60 IU/L (ref 39–117)
ALT SERPL-CCNC: 23 IU/L (ref 0–32)
AST SERPL-CCNC: 18 IU/L (ref 0–40)
BASOPHILS # BLD AUTO: 0.1 X10E3/UL (ref 0–0.2)
BASOPHILS NFR BLD AUTO: 1 %
BILIRUB SERPL-MCNC: 0.5 MG/DL (ref 0–1.2)
BUN SERPL-MCNC: 17 MG/DL (ref 8–27)
BUN/CREAT SERPL: 22 (ref 12–28)
CALCIUM SERPL-MCNC: 9.5 MG/DL (ref 8.7–10.3)
CHLORIDE SERPL-SCNC: 104 MMOL/L (ref 96–106)
CHOLEST SERPL-MCNC: 224 MG/DL (ref 100–199)
CO2 SERPL-SCNC: 23 MMOL/L (ref 20–29)
CREAT SERPL-MCNC: 0.76 MG/DL (ref 0.57–1)
EOSINOPHIL # BLD AUTO: 0.7 X10E3/UL (ref 0–0.4)
EOSINOPHIL NFR BLD AUTO: 9 %
ERYTHROCYTE [DISTWIDTH] IN BLOOD BY AUTOMATED COUNT: 13.6 % (ref 12.3–15.4)
FERRITIN SERPL-MCNC: 112 NG/ML (ref 15–150)
GLOBULIN SER CALC-MCNC: 2.6 G/DL (ref 1.5–4.5)
GLUCOSE SERPL-MCNC: 108 MG/DL (ref 65–99)
HBA1C MFR BLD: 6.1 % (ref 4.8–5.6)
HCT VFR BLD AUTO: 41 % (ref 34–46.6)
HDLC SERPL-MCNC: 59 MG/DL
HGB BLD-MCNC: 13.1 G/DL (ref 11.1–15.9)
IF AFRICAN AMERICAN  100797: 93 ML/MIN/1.73
IF NON AFRICAN AMER 100791: 80 ML/MIN/1.73
IMM GRANULOCYTES # BLD: 0 X10E3/UL (ref 0–0.1)
IMM GRANULOCYTES NFR BLD: 0 %
IMMATURE CELLS  115398: ABNORMAL
IRON SATN MFR SERPL: 26 % (ref 15–55)
IRON SERPL-MCNC: 84 UG/DL (ref 27–139)
LABORATORY COMMENT REPORT: ABNORMAL
LDLC SERPL CALC-MCNC: 145 MG/DL (ref 0–99)
LYMPHOCYTES # BLD AUTO: 1.9 X10E3/UL (ref 0.7–3.1)
LYMPHOCYTES NFR BLD AUTO: 26 %
MCH RBC QN AUTO: 27.5 PG (ref 26.6–33)
MCHC RBC AUTO-ENTMCNC: 32 G/DL (ref 31.5–35.7)
MCV RBC AUTO: 86 FL (ref 79–97)
MONOCYTES # BLD AUTO: 0.6 X10E3/UL (ref 0.1–0.9)
MONOCYTES NFR BLD AUTO: 8 %
MORPHOLOGY BLD-IMP: ABNORMAL
NEUTROPHILS # BLD AUTO: 4 X10E3/UL (ref 1.4–7)
NEUTROPHILS NFR BLD AUTO: 56 %
NRBC BLD AUTO-RTO: ABNORMAL %
PLATELET # BLD AUTO: 373 X10E3/UL (ref 150–379)
POTASSIUM SERPL-SCNC: 4.5 MMOL/L (ref 3.5–5.2)
PROT SERPL-MCNC: 7 G/DL (ref 6–8.5)
RBC # BLD AUTO: 4.76 X10E6/UL (ref 3.77–5.28)
SODIUM SERPL-SCNC: 141 MMOL/L (ref 134–144)
TIBC SERPL-MCNC: 325 UG/DL (ref 250–450)
TRIGL SERPL-MCNC: 101 MG/DL (ref 0–149)
UIBC SERPL-MCNC: 241 UG/DL (ref 118–369)
VLDLC SERPL CALC-MCNC: 20 MG/DL (ref 5–40)
WBC # BLD AUTO: 7.3 X10E3/UL (ref 3.4–10.8)

## 2018-11-13 ENCOUNTER — HOSPITAL ENCOUNTER (OUTPATIENT)
Dept: RADIOLOGY | Facility: MEDICAL CENTER | Age: 68
End: 2018-11-13
Attending: FAMILY MEDICINE
Payer: MEDICARE

## 2018-11-13 DIAGNOSIS — Z12.31 ENCOUNTER FOR SCREENING MAMMOGRAM FOR BREAST CANCER: ICD-10-CM

## 2018-11-13 PROCEDURE — 77067 SCR MAMMO BI INCL CAD: CPT

## 2019-06-03 ENCOUNTER — OFFICE VISIT (OUTPATIENT)
Dept: URGENT CARE | Facility: CLINIC | Age: 69
End: 2019-06-03
Payer: MEDICARE

## 2019-06-03 VITALS
TEMPERATURE: 100 F | BODY MASS INDEX: 39.49 KG/M2 | RESPIRATION RATE: 16 BRPM | DIASTOLIC BLOOD PRESSURE: 70 MMHG | HEART RATE: 90 BPM | SYSTOLIC BLOOD PRESSURE: 142 MMHG | HEIGHT: 65 IN | OXYGEN SATURATION: 94 % | WEIGHT: 237 LBS

## 2019-06-03 DIAGNOSIS — H65.02 ACUTE SEROUS OTITIS MEDIA OF LEFT EAR, RECURRENCE NOT SPECIFIED: ICD-10-CM

## 2019-06-03 DIAGNOSIS — J01.90 ACUTE SINUSITIS, RECURRENCE NOT SPECIFIED, UNSPECIFIED LOCATION: ICD-10-CM

## 2019-06-03 DIAGNOSIS — R05.9 COUGH: ICD-10-CM

## 2019-06-03 DIAGNOSIS — J06.9 VIRAL UPPER RESPIRATORY TRACT INFECTION: ICD-10-CM

## 2019-06-03 PROCEDURE — 99214 OFFICE O/P EST MOD 30 MIN: CPT | Performed by: NURSE PRACTITIONER

## 2019-06-03 RX ORDER — FLUTICASONE PROPIONATE 50 MCG
2 SPRAY, SUSPENSION (ML) NASAL DAILY
Qty: 1 BOTTLE | Refills: 0 | Status: SHIPPED | OUTPATIENT
Start: 2019-06-03 | End: 2019-06-17

## 2019-06-03 RX ORDER — DOXYCYCLINE HYCLATE 100 MG/1
100 CAPSULE ORAL 2 TIMES DAILY
Qty: 20 CAP | Refills: 0 | Status: SHIPPED | OUTPATIENT
Start: 2019-06-03 | End: 2019-06-13

## 2019-06-03 RX ORDER — ATORVASTATIN CALCIUM 10 MG/1
10 TABLET, FILM COATED ORAL NIGHTLY
COMMUNITY
End: 2019-10-29 | Stop reason: SDUPTHER

## 2019-06-03 RX ORDER — BENZONATATE 100 MG/1
100 CAPSULE ORAL 3 TIMES DAILY PRN
Qty: 60 CAP | Refills: 0 | Status: SHIPPED | OUTPATIENT
Start: 2019-06-03 | End: 2020-10-29

## 2019-06-03 ASSESSMENT — ENCOUNTER SYMPTOMS
NECK PAIN: 0
FEVER: 1
MYALGIAS: 0
SORE THROAT: 0
RHINORRHEA: 0
DIAPHORESIS: 0
HEADACHES: 1
WEIGHT LOSS: 0
HOARSE VOICE: 1
SHORTNESS OF BREATH: 0
HEARTBURN: 0
WHEEZING: 0
CHILLS: 1
COUGH: 1
SWOLLEN GLANDS: 0
SWEATS: 0
HEMOPTYSIS: 0
SINUS PRESSURE: 1

## 2019-06-03 ASSESSMENT — COPD QUESTIONNAIRES: COPD: 0

## 2019-06-03 NOTE — PROGRESS NOTES
Subjective:      Lauren Esquivel is a 68 y.o. female who presents with Cough (sinus problem, blowing a lot and has brown mucus, bodya ache, chills started a week ago)    Reviewed past medical, surgical and family history with patient. Reviewed prescription and OTC medications with patient in electronic health record today.     Allergies   Allergen Reactions   • Ace Inhibitors      Cough   • Ciprofloxacin              Cough   This is a new problem. The current episode started 1 to 4 weeks ago. The problem has been unchanged. The cough is productive of purulent sputum. Associated symptoms include chills, ear pain, a fever, headaches and nasal congestion. Pertinent negatives include no chest pain, ear congestion, heartburn, hemoptysis, myalgias, postnasal drip, rash, rhinorrhea, sore throat, shortness of breath, sweats, weight loss or wheezing. The symptoms are aggravated by lying down. She has tried OTC cough suppressant for the symptoms. The treatment provided mild relief. Her past medical history is significant for asthma and bronchitis. There is no history of bronchiectasis, COPD, emphysema, environmental allergies or pneumonia.   Sinus Problem   This is a new problem. The current episode started in the past 7 days. The problem has been waxing and waning since onset. The maximum temperature recorded prior to her arrival was 100.4 - 100.9 F. The fever has been present for less than 1 day. Her pain is at a severity of 4/10. The pain is mild. Associated symptoms include chills, coughing, ear pain, headaches, a hoarse voice and sinus pressure. Pertinent negatives include no congestion, diaphoresis, neck pain, shortness of breath, sneezing, sore throat or swollen glands. Past treatments include acetaminophen (sinus rinse). The treatment provided mild relief.       Review of Systems   Constitutional: Positive for chills and fever. Negative for diaphoresis and weight loss.   HENT: Positive for ear pain, hoarse  "voice and sinus pressure. Negative for congestion, postnasal drip, rhinorrhea, sneezing and sore throat.    Respiratory: Positive for cough. Negative for hemoptysis, shortness of breath and wheezing.    Cardiovascular: Negative for chest pain.   Gastrointestinal: Negative for heartburn.   Musculoskeletal: Negative for myalgias and neck pain.   Skin: Negative for rash.   Neurological: Positive for headaches.   Endo/Heme/Allergies: Negative for environmental allergies.          Objective:     /70   Pulse 90   Temp 37.8 °C (100 °F)   Resp 16   Ht 1.651 m (5' 5\")   Wt 107.5 kg (237 lb)   LMP 06/04/2005   SpO2 94%   BMI 39.44 kg/m²      Physical Exam   Constitutional: She is oriented to person, place, and time. She appears well-developed and well-nourished. No distress.   HENT:   Head: Normocephalic.   Right Ear: Hearing, tympanic membrane, external ear and ear canal normal.   Left Ear: Hearing, external ear and ear canal normal. Tympanic membrane is bulging. A middle ear effusion is present.   Nose: Sinus tenderness present. No mucosal edema or rhinorrhea. Right sinus exhibits frontal sinus tenderness. Right sinus exhibits no maxillary sinus tenderness. Left sinus exhibits frontal sinus tenderness. Left sinus exhibits no maxillary sinus tenderness.   Mouth/Throat: Uvula is midline. Oropharyngeal exudate present.   Eyes: Pupils are equal, round, and reactive to light. Right conjunctiva is injected. Left conjunctiva is injected.   Neck: Trachea normal, normal range of motion, full passive range of motion without pain and phonation normal. Neck supple.   Cardiovascular: Normal rate, regular rhythm, intact distal pulses and normal pulses.  PMI is not displaced.    Pulmonary/Chest: Effort normal and breath sounds normal.   Lymphadenopathy:     She has no cervical adenopathy.        Right: No supraclavicular adenopathy present.        Left: No supraclavicular adenopathy present.   Neurological: She is alert and " oriented to person, place, and time. Gait normal.   Skin: Skin is warm and dry. Capillary refill takes less than 2 seconds. She is not diaphoretic.   Psychiatric: She has a normal mood and affect. Her speech is normal and behavior is normal. Judgment and thought content normal. Cognition and memory are normal.   Nursing note and vitals reviewed.              Assessment/Plan:     1. Viral upper respiratory tract infection  fluticasone (FLONASE) 50 MCG/ACT nasal spray    doxycycline (VIBRAMYCIN) 100 MG Cap   2. Acute sinusitis, recurrence not specified, unspecified location  doxycycline (VIBRAMYCIN) 100 MG Cap   3. Acute serous otitis media of left ear, recurrence not specified  doxycycline (VIBRAMYCIN) 100 MG Cap       Educated in proper administration of medication(s) ordered today including safety, possible SE, risks, benefits, rationale and alternatives to therapy.     OTC antihistamine of choice. Follow manufactures dosing and safety guidelines.       Return to clinic or PCP 5-7  days if current symptoms are not resolving in a satisfactory manner or sooner if new or worsening symptoms occur.   Patient was advised of signs and symptoms which would warrant further evaluation and /or emergent evaluation in ER.  Verbalized agreement with this treatment plan and seemed to understand without barriers. Questions were encouraged and answered to patients satisfaction.     Aftercare instructions were given to pt

## 2019-10-03 DIAGNOSIS — I10 ESSENTIAL HYPERTENSION: ICD-10-CM

## 2019-10-03 DIAGNOSIS — E78.00 PURE HYPERCHOLESTEROLEMIA: ICD-10-CM

## 2019-10-03 DIAGNOSIS — R73.03 PREDIABETES: ICD-10-CM

## 2019-10-08 RX ORDER — TELMISARTAN 40 MG/1
TABLET ORAL
Qty: 60 TAB | Refills: 0 | Status: SHIPPED | OUTPATIENT
Start: 2019-10-08 | End: 2019-10-29 | Stop reason: SDUPTHER

## 2019-10-09 NOTE — TELEPHONE ENCOUNTER
Please schedule appointment for AWV and med refills. Please advise pt to do fasting blood tests prior to OV.   Yessy Paz M.D.

## 2019-10-10 NOTE — TELEPHONE ENCOUNTER
Phone Number Called: [phone] (home)       Call outcome: left message for patient to call back regarding message below    Message: LM for patient to call to schedule an appointment per provider message below.

## 2019-10-17 NOTE — TELEPHONE ENCOUNTER
Phone Number Called: [phone] (home)     Call outcome: spoke to patient regarding message below    Future Appointments       Provider Department Center    10/29/2019 2:20 PM Yessy Paz M.D. [facility] [address]        Lab requisitions printed and mailed to patient at home as she uses [facility].    Message: Per Dr. Paz - Please schedule appointment for AWV and med refills. Please advise pt to do fasting blood tests prior to OV.

## 2019-10-29 ENCOUNTER — OFFICE VISIT (OUTPATIENT)
Dept: MEDICAL GROUP | Age: 69
End: 2019-10-29
Payer: MEDICARE

## 2019-10-29 VITALS
SYSTOLIC BLOOD PRESSURE: 122 MMHG | OXYGEN SATURATION: 92 % | DIASTOLIC BLOOD PRESSURE: 82 MMHG | TEMPERATURE: 98.4 F | HEIGHT: 65 IN | HEART RATE: 91 BPM | WEIGHT: 231.8 LBS | BODY MASS INDEX: 38.62 KG/M2

## 2019-10-29 DIAGNOSIS — Z23 NEED FOR VACCINATION: ICD-10-CM

## 2019-10-29 DIAGNOSIS — Z12.31 ENCOUNTER FOR SCREENING MAMMOGRAM FOR BREAST CANCER: ICD-10-CM

## 2019-10-29 DIAGNOSIS — R73.03 PREDIABETES: ICD-10-CM

## 2019-10-29 DIAGNOSIS — J45.30 MILD PERSISTENT ASTHMA WITHOUT COMPLICATION: ICD-10-CM

## 2019-10-29 DIAGNOSIS — E78.00 PURE HYPERCHOLESTEROLEMIA: ICD-10-CM

## 2019-10-29 DIAGNOSIS — Z00.00 MEDICARE ANNUAL WELLNESS VISIT, SUBSEQUENT: ICD-10-CM

## 2019-10-29 DIAGNOSIS — I10 ESSENTIAL HYPERTENSION: ICD-10-CM

## 2019-10-29 DIAGNOSIS — E66.9 OBESITY (BMI 30-39.9): ICD-10-CM

## 2019-10-29 DIAGNOSIS — G47.33 OSA ON CPAP: ICD-10-CM

## 2019-10-29 LAB
ALBUMIN SERPL-MCNC: 4.5 G/DL (ref 3.5–4.8)
ALBUMIN/GLOB SERPL: 1.8 {RATIO} (ref 1.2–2.2)
ALP SERPL-CCNC: 51 IU/L (ref 39–117)
ALT SERPL-CCNC: 27 IU/L (ref 0–32)
AST SERPL-CCNC: 21 IU/L (ref 0–40)
BASOPHILS # BLD AUTO: 0 X10E3/UL (ref 0–0.2)
BASOPHILS NFR BLD AUTO: 1 %
BILIRUB SERPL-MCNC: 0.4 MG/DL (ref 0–1.2)
BUN SERPL-MCNC: 17 MG/DL (ref 8–27)
BUN/CREAT SERPL: 23 (ref 12–28)
CALCIUM SERPL-MCNC: 9.5 MG/DL (ref 8.7–10.3)
CHLORIDE SERPL-SCNC: 105 MMOL/L (ref 96–106)
CHOLEST SERPL-MCNC: 242 MG/DL (ref 100–199)
CO2 SERPL-SCNC: 21 MMOL/L (ref 20–29)
CREAT SERPL-MCNC: 0.74 MG/DL (ref 0.57–1)
EOSINOPHIL # BLD AUTO: 0.5 X10E3/UL (ref 0–0.4)
EOSINOPHIL NFR BLD AUTO: 8 %
ERYTHROCYTE [DISTWIDTH] IN BLOOD BY AUTOMATED COUNT: 14.2 % (ref 12.3–15.4)
GLOBULIN SER CALC-MCNC: 2.5 G/DL (ref 1.5–4.5)
GLUCOSE SERPL-MCNC: 103 MG/DL (ref 65–99)
HBA1C MFR BLD: 6 % (ref 4.8–5.6)
HCT VFR BLD AUTO: 42.5 % (ref 34–46.6)
HDLC SERPL-MCNC: 60 MG/DL
HGB BLD-MCNC: 14.1 G/DL (ref 11.1–15.9)
IMM GRANULOCYTES # BLD AUTO: 0 X10E3/UL (ref 0–0.1)
IMM GRANULOCYTES NFR BLD AUTO: 0 %
IMMATURE CELLS  115398: ABNORMAL
LABORATORY COMMENT REPORT: ABNORMAL
LDLC SERPL CALC-MCNC: 164 MG/DL (ref 0–99)
LYMPHOCYTES # BLD AUTO: 1.6 X10E3/UL (ref 0.7–3.1)
LYMPHOCYTES NFR BLD AUTO: 28 %
MCH RBC QN AUTO: 28.1 PG (ref 26.6–33)
MCHC RBC AUTO-ENTMCNC: 33.2 G/DL (ref 31.5–35.7)
MCV RBC AUTO: 85 FL (ref 79–97)
MONOCYTES # BLD AUTO: 0.5 X10E3/UL (ref 0.1–0.9)
MONOCYTES NFR BLD AUTO: 8 %
MORPHOLOGY BLD-IMP: ABNORMAL
NEUTROPHILS # BLD AUTO: 3.2 X10E3/UL (ref 1.4–7)
NEUTROPHILS NFR BLD AUTO: 55 %
NRBC BLD AUTO-RTO: ABNORMAL %
PLATELET # BLD AUTO: 324 X10E3/UL (ref 150–450)
POTASSIUM SERPL-SCNC: 4.6 MMOL/L (ref 3.5–5.2)
PROT SERPL-MCNC: 7 G/DL (ref 6–8.5)
RBC # BLD AUTO: 5.01 X10E6/UL (ref 3.77–5.28)
SODIUM SERPL-SCNC: 143 MMOL/L (ref 134–144)
TRIGL SERPL-MCNC: 90 MG/DL (ref 0–149)
VLDLC SERPL CALC-MCNC: 18 MG/DL (ref 5–40)
WBC # BLD AUTO: 5.8 X10E3/UL (ref 3.4–10.8)

## 2019-10-29 PROCEDURE — G0439 PPPS, SUBSEQ VISIT: HCPCS | Performed by: FAMILY MEDICINE

## 2019-10-29 PROCEDURE — 90715 TDAP VACCINE 7 YRS/> IM: CPT | Performed by: FAMILY MEDICINE

## 2019-10-29 PROCEDURE — 90472 IMMUNIZATION ADMIN EACH ADD: CPT | Performed by: FAMILY MEDICINE

## 2019-10-29 PROCEDURE — G0008 ADMIN INFLUENZA VIRUS VAC: HCPCS | Performed by: FAMILY MEDICINE

## 2019-10-29 PROCEDURE — 90662 IIV NO PRSV INCREASED AG IM: CPT | Performed by: FAMILY MEDICINE

## 2019-10-29 RX ORDER — ATORVASTATIN CALCIUM 20 MG/1
20 TABLET, FILM COATED ORAL
Qty: 90 TAB | Refills: 3 | Status: SHIPPED | OUTPATIENT
Start: 2019-10-29 | End: 2020-10-29 | Stop reason: SDUPTHER

## 2019-10-29 RX ORDER — TELMISARTAN 40 MG/1
40 TABLET ORAL
Qty: 90 TAB | Refills: 3 | Status: SHIPPED | OUTPATIENT
Start: 2019-10-29 | End: 2020-10-29 | Stop reason: SDUPTHER

## 2019-10-29 ASSESSMENT — PATIENT HEALTH QUESTIONNAIRE - PHQ9: CLINICAL INTERPRETATION OF PHQ2 SCORE: 0

## 2019-10-29 ASSESSMENT — ENCOUNTER SYMPTOMS: GENERAL WELL-BEING: GOOD

## 2019-10-29 ASSESSMENT — ACTIVITIES OF DAILY LIVING (ADL): BATHING_REQUIRES_ASSISTANCE: 0

## 2019-10-29 NOTE — PROGRESS NOTES
CC: LISA      HPI:  Lauren Esquivel is a 69 y.o. here for Medicare Annual Wellness Visit     Patient has a chronic history of hypertension, currently taking Micardis 40 mg daily without side effects. She presents with a BP reading of 122/82 today.     Patient has a history of hypercholesteremia currently managed through diet and exercise. Patient has lost 6 lbs of weight over the last 4 months. She has been on a new Keto diet. Lipid panel on 10/28/19 shows Total cholesterol was 242, worse compared with previous level of 224. LDL is elevated at 164, worse compared with previous LDL of 145.    Patient has a history of mild asthma, under good control with Adviar as needed. Patient denies any cough, shortness of breath, or chest pain.     Patient has a known history of obstructive sleep apnea, currently tolerating CPAP well without difficulty.     Patient has a history of prediabetes currently managed with diet and exercise. Recent Hemoglobin A1c is improved to 6.0 compared with prior A1c of 6.1 in 11/2018.       Patient Active Problem List    Diagnosis Date Noted   • Prediabetes 10/03/2017   • Obesity (BMI 30-39.9) 10/03/2017   • Mild persistent asthma without complication, Dr Borja 01/03/2017   • History of nephrolithiasis 08/21/2012   • Hyperlipidemia 08/28/2010   • HTN (hypertension) 08/11/2010   • SENTHIL on CPAP        Current Outpatient Medications   Medication Sig Dispense Refill   • atorvastatin (LIPITOR) 20 MG Tab Take 1 Tab by mouth every bedtime. 90 Tab 3   • telmisartan (MICARDIS) 40 MG Tab Take 1 Tab by mouth every day. 90 Tab 3   • benzonatate (TESSALON) 100 MG Cap Take 1 Cap by mouth 3 times a day as needed for Cough. 60 Cap 0   • albuterol 108 (90 Base) MCG/ACT Aero Soln inhalation aerosol Inhale 2 Puffs by mouth every 6 hours as needed for Shortness of Breath. 8.5 g 2   • fluticasone-salmeterol (ADVAIR DISKUS) 250-50 MCG/DOSE AEROSOL POWDER, BREATH ACTIVATED Inhale 1 Puff by mouth 2 times a day. 2  Inhaler 5     No current facility-administered medications for this visit.             Current supplements as per medication list.       Allergies: Ace inhibitors and Ciprofloxacin    Current social contact/activities: , lives with her .     She  reports that she has never smoked. She has never used smokeless tobacco. She reports that she does not drink alcohol or use drugs.  Counseling given: Not Answered      DPA/Advanced Directive:  Patient does not have an Advanced Directive.  A packet and workshop information was given on Advanced Directives.    ROS:    Gait: Uses no assistive device  Ostomy: No  Other tubes: No  Amputations: No  Chronic oxygen use: No  Last eye exam: 2018  Wears hearing aids: No   : Denies any urinary leakage during the last 6 months    Screening:  Depression Screening    Little interest or pleasure in doing things?  0 - not at all  Feeling down, depressed , or hopeless? 0 - not at all  Patient Health Questionnaire Score: 0     Screening for Cognitive Impairment    Three Minute Recall (village, kitchen, baby) 3/3    Joel clock face with all 12 numbers and set the hands to show 10 past 10.  Yes      Fall Risk Assessment    Has the patient had two or more falls in the last year or any fall with injury in the last year?  No    Safety Assessment    Throw rugs on floor.  Yes  Handrails on all stairs.  Yes  Good lighting in all hallways.  Yes  Difficulty hearing.  Yes  Patient counseled about all safety risks that were identified.    Functional Assessment ADLs    Are there any barriers preventing you from cooking for yourself or meeting nutritional needs?  No.    Are there any barriers preventing you from driving safely or obtaining transportation?  No.    Are there any barriers preventing you from using a telephone or calling for help?  No.    Are there any barriers preventing you from shopping?  No.    Are there any barriers preventing you from taking care of your own finances?   No.    Are there any barriers preventing you from managing your medications?  No.    Are there any barriers preventing you from showering, bathing or dressing yourself?  No.    Are you currently engaging in any exercise or physical activity?  Yes.     What is your perception of your health?  Good.      Health Maintenance Summary                MAMMOGRAM Next Due 11/13/2019      Done 11/13/2018 MA-SCREENING MAMMO BILAT W/TOMOSYNTHESIS W/CAD     Patient has more history with this topic...    IMM ZOSTER VACCINES Postponed 3/29/2020 Originally 8/16/1999. System: vaccine not available, other system reasons    BONE DENSITY Next Due 2/5/2020      Done 2/5/2015 DS-BONE DENSITY STUDY (DEXA)    Annual Wellness Visit Next Due 10/29/2020      Done 10/29/2019 SUBSEQUENT ANNUAL WELLNESS VISIT-INCLUDES PPPS ()     Patient has more history with this topic...    COLONOSCOPY Next Due 9/16/2024      Done 9/16/2014 AMB REFERRAL TO GI FOR COLONOSCOPY     Patient has more history with this topic...    IMM DTaP/Tdap/Td Vaccine Next Due 10/29/2029      Done 10/29/2019 Imm Admin: Tdap Vaccine     Patient has more history with this topic...          Patient Care Team:  Yessy Paz M.D. as PCP - General (Family Medicine)        Social History     Tobacco Use   • Smoking status: Never Smoker   • Smokeless tobacco: Never Used   Substance Use Topics   • Alcohol use: No     Alcohol/week: 0.0 oz   • Drug use: No     Family History   Problem Relation Age of Onset   • Lung Disease Mother    • Hypertension Mother    • Heart Disease Father    • Hypertension Maternal Grandfather    • Stroke Brother    • Heart Disease Brother      She  has a past medical history of Anemia, Anxiety state, unspecified (1/3/2010), ASTHMA, Chronic rhinitis (1/3/2010), COPD, Elevated C-reactive protein (CRP) (2/6/2012), H/O pyelonephritis (8/21/2012), H/O: female infertility (8/10/2011), History of nephrolithiasis (8/21/2012), HTN, HTN (hypertension) (8/11/2010),  "Hyperglycemia (6/4/2012), Hyperlipidemia (8/28/2010), Influenza A with pneumonia (12/27/2016), Kidney filling defect (8/21/2012), Laryngeal spasm (1/3/2010), Metabolic syndrome (6/4/2012), Mycoplasma pneumonia (2003), Obesity (6/4/2012), Preventative health care (8/11/2010), Pyelonephritis (7/30/2012), Respiratory malfunction arising from mental factors (1/3/2010), Sleep apnea, Vitamin d deficiency (8/28/2010), and Wheeze (12/4/2009). She also has no past medical history of Encounter for long-term (current) use of other medications.   Past Surgical History:   Procedure Laterality Date   • LAPAROSCOPY      for infertility   • TONSILLECTOMY AND ADENOIDECTOMY         Exam:   /82 (BP Location: Left arm, Patient Position: Sitting, BP Cuff Size: Adult)   Pulse 91   Temp 36.9 °C (98.4 °F) (Temporal)   Ht 1.651 m (5' 5\")   Wt 105.1 kg (231 lb 12.8 oz)   SpO2 92%  Body mass index is 38.57 kg/m².    Hearing excellent.    Dentition good  Alert, oriented in no acute distress.  Eye contact is good, speech goal directed, affect calm    Assessment and Plan. The following treatment and monitoring plan is recommended:      1. Essential hypertension  Chronic, controlled with Telmisartan 40 mg qd, no s/e reported, will continue.   - telmisartan (MICARDIS) 40 MG Tab; Take 1 Tab by mouth every day.  Dispense: 90 Tab; Refill: 3  - CBC WITH DIFFERENTIAL; Future  - Comp Metabolic Panel; Future  - Pt was counseled on dietary modification, weight loss, smoking cessation, and avoidance of excessive alcohol consumption.    - Recommended moderate intensity exercise at least 30 minutes per day x 5 days per week.     2. Pure hypercholesterolemia  Chronic, previously managed with lifestyle modification, however, lipid profile is worsened per recent blood tests, likely secondary to keto diet. 10-year CVD risks > 10%.   - atorvastatin (LIPITOR) 20 MG Tab; Take 1 Tab by mouth every bedtime.  Dispense: 90 Tab; Refill: 3  - Lipid Profile; " Future  - recommended dietary modification, exercise, and weight loss.      3. Mild persistent asthma without complication, Dr Huong Mendoza, working with Dr. Borja, stable and controlled with Advair and Albuterol PRN.   - cont Advair and Albuerol as directed  - f/u with Pulm as directed     4. Obesity (BMI 30-39.9)  On keto diet recently, lost a few lbs since last OV. Appropriate counseling provided.   - Patient identified as having weight management issue.  Appropriate orders and counseling given.    5. SENTHIL on CPAP  Chronic, on CPAP consistently, she was referred to sleep medicine last year, but has not followed up with them.   - cont CPAP   - encouraged pt to have regular f/u with sleep medicine, at least once per year.     6. Prediabetes  - Pt was counseled on dietary modification, weight loss   - Recommended moderate intensity exercise at least 30 minutes per day x 5 days per week.  - Follow up in 6 months.   - HEMOGLOBIN A1C; Future    7. Encounter for screening mammogram for breast cancer  - MA-SCREEN MAMMO W/CAD-BILAT; Future    8. Need for vaccination  - INFLUENZA VACCINE, HIGH DOSE (65+ ONLY)  - Tdap Vaccine =>8YO IM    9. Medicare annual wellness visit, subsequent  - Subsequent Annual Wellness Visit - Includes PPPS ()     Services suggested: No services needed at this time  Health Care Screening: Age-appropriate preventive services recommended by USPTF and ACIP covered by Medicare were discussed today. Services ordered if indicated and agreed upon by the patient.  Referrals offered: Community-based lifestyle interventions to reduce health risks and promote self-management and wellness, fall prevention, nutrition, physical activity, tobacco-use cessation, weight loss, and mental health services as per orders if indicated.    Discussion today about general wellness and lifestyle habits:    · Prevent falls and reduce trip hazards; Cautioned about securing or removing rugs.  · Have a working fire alarm  and carbon monoxide detector;   · Engage in regular physical activity and social activities     Follow-up: Return in about 1 year (around 10/29/2020) for Annual wellness visit.

## 2020-01-06 ENCOUNTER — HOSPITAL ENCOUNTER (OUTPATIENT)
Dept: RADIOLOGY | Facility: MEDICAL CENTER | Age: 70
End: 2020-01-06
Attending: FAMILY MEDICINE
Payer: MEDICARE

## 2020-01-06 DIAGNOSIS — Z12.31 ENCOUNTER FOR SCREENING MAMMOGRAM FOR BREAST CANCER: ICD-10-CM

## 2020-01-06 PROCEDURE — 77067 SCR MAMMO BI INCL CAD: CPT

## 2020-02-28 DIAGNOSIS — R05.9 COUGH: ICD-10-CM

## 2020-03-03 RX ORDER — ALBUTEROL SULFATE 2.5 MG/3ML
2.5 SOLUTION RESPIRATORY (INHALATION) EVERY 6 HOURS PRN
Qty: 75 ML | Refills: 0 | Status: SHIPPED | OUTPATIENT
Start: 2020-03-03

## 2020-03-12 ENCOUNTER — OFFICE VISIT (OUTPATIENT)
Dept: URGENT CARE | Facility: CLINIC | Age: 70
End: 2020-03-12
Payer: MEDICARE

## 2020-03-12 VITALS
BODY MASS INDEX: 35.36 KG/M2 | SYSTOLIC BLOOD PRESSURE: 130 MMHG | HEART RATE: 74 BPM | RESPIRATION RATE: 16 BRPM | WEIGHT: 220 LBS | OXYGEN SATURATION: 95 % | DIASTOLIC BLOOD PRESSURE: 90 MMHG | TEMPERATURE: 99 F | HEIGHT: 66 IN

## 2020-03-12 DIAGNOSIS — S05.02XA ABRASION OF LEFT CORNEA, INITIAL ENCOUNTER: ICD-10-CM

## 2020-03-12 PROCEDURE — 99214 OFFICE O/P EST MOD 30 MIN: CPT | Performed by: NURSE PRACTITIONER

## 2020-03-12 RX ORDER — POLYMYXIN B SULFATE AND TRIMETHOPRIM 1; 10000 MG/ML; [USP'U]/ML
1 SOLUTION OPHTHALMIC EVERY 4 HOURS
Qty: 1 BOTTLE | Refills: 0 | Status: SHIPPED | OUTPATIENT
Start: 2020-03-12 | End: 2020-10-29

## 2020-03-12 ASSESSMENT — ENCOUNTER SYMPTOMS
CONSTITUTIONAL NEGATIVE: 1
FOREIGN BODY SENSATION: 1
BLURRED VISION: 1
FEVER: 0
EYE PAIN: 1
EYE REDNESS: 1
NEUROLOGICAL NEGATIVE: 1
EYE DISCHARGE: 0

## 2020-03-12 ASSESSMENT — VISUAL ACUITY
OS_CC: 20/30
OD_CC: 20/25

## 2020-03-12 ASSESSMENT — FIBROSIS 4 INDEX: FIB4 SCORE: 0.87

## 2020-03-12 NOTE — PROGRESS NOTES
Subjective:     Lauren Esquivel is a 69 y.o. female who presents for Foreign Body in Eye (left eye 2 hours ago)       Eye Problem    The left eye is affected. This is a new problem. The problem has been gradually worsening. The pain is mild. Associated symptoms include blurred vision (Mild, left), eye redness and a foreign body sensation. Pertinent negatives include no eye discharge or fever. She has tried water for the symptoms. The treatment provided no relief.     Patient reports about 2 hours ago she was using a eye pencil when she noticed irritation and the foreign body sensation in the left eye.  Is unsure if she got pieces of the pencil inside her left eye.    PMH:  has a past medical history of Anemia, Anxiety state, unspecified (1/3/2010), ASTHMA, Chronic rhinitis (1/3/2010), COPD, Elevated C-reactive protein (CRP) (2/6/2012), H/O pyelonephritis (8/21/2012), H/O: female infertility (8/10/2011), History of nephrolithiasis (8/21/2012), HTN, HTN (hypertension) (8/11/2010), Hyperglycemia (6/4/2012), Hyperlipidemia (8/28/2010), Influenza A with pneumonia (12/27/2016), Kidney filling defect (8/21/2012), Laryngeal spasm (1/3/2010), Metabolic syndrome (6/4/2012), Mycoplasma pneumonia (2003), Obesity (6/4/2012), Preventative health care (8/11/2010), Pyelonephritis (7/30/2012), Respiratory malfunction arising from mental factors (1/3/2010), Sleep apnea, Vitamin d deficiency (8/28/2010), and Wheeze (12/4/2009). She also has no past medical history of Encounter for long-term (current) use of other medications.    MEDS:   Current Outpatient Medications:   •  polymixin-trimethoprim (POLYTRIM) 94667-7.1 UNIT/ML-% Solution, Place 1 Drop in left eye every 4 hours., Disp: 1 Bottle, Rfl: 0  •  albuterol (PROVENTIL) 2.5mg/3ml Nebu Soln solution for nebulization, 3 mL by Nebulization route every 6 hours as needed for Shortness of Breath., Disp: 75 mL, Rfl: 0  •  telmisartan (MICARDIS) 40 MG Tab, Take 1 Tab by mouth every  "day., Disp: 90 Tab, Rfl: 3  •  albuterol 108 (90 Base) MCG/ACT Aero Soln inhalation aerosol, Inhale 2 Puffs by mouth every 6 hours as needed for Shortness of Breath., Disp: 8.5 g, Rfl: 2  •  fluticasone-salmeterol (ADVAIR DISKUS) 250-50 MCG/DOSE AEROSOL POWDER, BREATH ACTIVATED, Inhale 1 Puff by mouth 2 times a day., Disp: 2 Inhaler, Rfl: 5  •  atorvastatin (LIPITOR) 20 MG Tab, Take 1 Tab by mouth every bedtime. (Patient not taking: Reported on 3/12/2020), Disp: 90 Tab, Rfl: 3  •  benzonatate (TESSALON) 100 MG Cap, Take 1 Cap by mouth 3 times a day as needed for Cough. (Patient not taking: Reported on 3/12/2020), Disp: 60 Cap, Rfl: 0    ALLERGIES:   Allergies   Allergen Reactions   • Ace Inhibitors      Cough   • Ciprofloxacin      SURGHX:   Past Surgical History:   Procedure Laterality Date   • LAPAROSCOPY      for infertility   • TONSILLECTOMY AND ADENOIDECTOMY       SOCHX:  reports that she has never smoked. She has never used smokeless tobacco. She reports that she does not drink alcohol or use drugs.     FH: Reviewed with patient, not pertinent to this visit.    Review of Systems   Constitutional: Negative.  Negative for fever.   Eyes: Positive for blurred vision (Mild, left), pain (Irritation, left) and redness. Negative for discharge.   Neurological: Negative.    All other systems reviewed and are negative.    Additional details per HPI.    Objective:     /90   Pulse 74   Temp 37.2 °C (99 °F) (Temporal)   Resp 16   Ht 1.676 m (5' 6\")   Wt 99.8 kg (220 lb)   LMP 06/04/2005 (LMP Unknown)   SpO2 95%   BMI 35.51 kg/m²     Physical Exam  Vitals signs reviewed.   Constitutional:       General: She is not in acute distress.     Appearance: She is well-developed. She is not toxic-appearing or diaphoretic.   HENT:      Head: Normocephalic.      Right Ear: External ear normal.      Left Ear: External ear normal.      Nose: Nose normal.   Eyes:      General: Lids are normal. Lids are everted, no foreign " bodies appreciated.         Left eye: No foreign body or discharge.      Extraocular Movements: Extraocular movements intact.      Left eye: Normal extraocular motion.      Conjunctiva/sclera:      Left eye: Left conjunctiva is injected. No chemosis.     Pupils: Pupils are equal, round, and reactive to light.      Left eye: No fluorescein uptake. Mahogany exam negative.  Neck:      Musculoskeletal: Normal range of motion.   Cardiovascular:      Rate and Rhythm: Normal rate.   Pulmonary:      Effort: Pulmonary effort is normal. No respiratory distress.   Musculoskeletal: Normal range of motion.         General: No deformity.   Skin:     General: Skin is warm and dry.      Coloration: Skin is not pale.   Neurological:      Mental Status: She is alert and oriented to person, place, and time.      Sensory: No sensory deficit.      Coordination: Coordination normal.   Psychiatric:         Behavior: Behavior normal. Behavior is cooperative.       Visual acuity corrected: OD 20/25, OS 20/30, OU 20/20       Assessment/Plan:     1. Abrasion of left cornea, initial encounter  - polymixin-trimethoprim (POLYTRIM) 18178-6.1 UNIT/ML-% Solution; Place 1 Drop in left eye every 4 hours.  Dispense: 1 Bottle; Refill: 0    Prophylactic Rx as above sent electronically.    Discussed close monitoring and supportive measures including increasing fluids and rest as well as OTC symptom management per 's instructions.  Recommend initiation of OTC lubricating artificial tears.  OTC ibuprofen/acetaminophen as needed for pain.    Discharge summary provided.    Vital signs stable, afebrile, asymptomatic, no acute distress.    Warning signs reviewed. Return precautions discussed.    Patient advised to: Return for 1) Symptoms don't improve or worsen, or go to ER, 2) Follow up with primary care in 7-10 days.    Differential diagnosis, natural history, supportive care, and indications for immediate follow-up discussed. All questions  answered. Patient agrees with the plan of care.

## 2020-03-12 NOTE — PATIENT INSTRUCTIONS
You may also use any combination of the following over-the-counter medications per 's instructions:    - Blink GelTears Lubricating Eye Drops    Corneal Abrasion  The cornea is the clear covering at the front and center of the eye. When looking at the colored portion of the eye (iris), you are looking through the cornea. This very thin tissue is made up of many layers. The surface layer is a single layer of cells (corneal epithelium) and is one of the most sensitive tissues in the body. If a scratch or injury causes the corneal epithelium to come off, it is called a corneal abrasion. If the injury extends to the tissues below the epithelium, the condition is called a corneal ulcer.  CAUSES   · Scratches.  · Trauma.  · Foreign body in the eye.  Some people have recurrences of abrasions in the area of the original injury even after it has healed (recurrent erosion syndrome). Recurrent erosion syndrome generally improves and goes away with time.  SYMPTOMS   · Eye pain.  · Difficulty or inability to keep the injured eye open.  · The eye becomes very sensitive to light.  · Recurrent erosions tend to happen suddenly, first thing in the morning, usually after waking up and opening the eye.  DIAGNOSIS   Your health care provider can diagnose a corneal abrasion during an eye exam. Dye is usually placed in the eye using a drop or a small paper strip moistened by your tears. When the eye is examined with a special light, the abrasion shows up clearly because of the dye.  TREATMENT   · Small abrasions may be treated with antibiotic drops or ointment alone.  · A pressure patch may be put over the eye. If this is done, follow your doctor's instructions for when to remove the patch. Do not drive or use machines while the eye patch is on. Judging distances is hard to do with a patch on.  If the abrasion becomes infected and spreads to the deeper tissues of the cornea, a corneal ulcer can result. This is serious  because it can cause corneal scarring. Corneal scars interfere with light passing through the cornea and cause a loss of vision in the involved eye.  HOME CARE INSTRUCTIONS  · Use medicine or ointment as directed. Only take over-the-counter or prescription medicines for pain, discomfort, or fever as directed by your health care provider.  · Do not drive or operate machinery if your eye is patched. Your ability to  distances is impaired.  · If your health care provider has given you a follow-up appointment, it is very important to keep that appointment. Not keeping the appointment could result in a severe eye infection or permanent loss of vision. If there is any problem keeping the appointment, let your health care provider know.  SEEK MEDICAL CARE IF:   · You have pain, light sensitivity, and a scratchy feeling in one eye or both eyes.  · Your pressure patch keeps loosening up, and you can blink your eye under the patch after treatment.  · Any kind of discharge develops from the eye after treatment or if the lids stick together in the morning.  · You have the same symptoms in the morning as you did with the original abrasion days, weeks, or months after the abrasion healed.  This information is not intended to replace advice given to you by your health care provider. Make sure you discuss any questions you have with your health care provider.  Document Released: 12/15/2001 Document Revised: 09/07/2016 Document Reviewed: 08/25/2014  [employer] Interactive Patient Education © 2017 [employer]

## 2020-03-16 DIAGNOSIS — J45.30 MILD PERSISTENT ASTHMA WITHOUT COMPLICATION: ICD-10-CM

## 2020-03-26 ENCOUNTER — TELEPHONE (OUTPATIENT)
Dept: MEDICAL GROUP | Age: 70
End: 2020-03-26

## 2020-03-26 NOTE — TELEPHONE ENCOUNTER
VOICEMAIL  1. Caller Name: Lauren Esquivel                        Call Back Number: [phone] (home)       2. Message: pt stated they have an infected toe and would like to schedule an appointment with Dr Paz.    3. Patient approves office to leave a detailed voicemail/MyChart message: N\A    Phone Number Called: [phone] (home)       Call outcome: Did not leave a detailed message. Requested patient to call back.    Message: LVM asking pt to call back to schedule appt.

## 2020-10-29 ENCOUNTER — OFFICE VISIT (OUTPATIENT)
Dept: MEDICAL GROUP | Age: 70
End: 2020-10-29
Payer: MEDICARE

## 2020-10-29 VITALS
SYSTOLIC BLOOD PRESSURE: 116 MMHG | HEIGHT: 66 IN | TEMPERATURE: 98.2 F | OXYGEN SATURATION: 94 % | WEIGHT: 227 LBS | HEART RATE: 68 BPM | BODY MASS INDEX: 36.48 KG/M2 | DIASTOLIC BLOOD PRESSURE: 76 MMHG

## 2020-10-29 DIAGNOSIS — Z23 NEED FOR VACCINATION: ICD-10-CM

## 2020-10-29 DIAGNOSIS — R01.1 HEART MURMUR: ICD-10-CM

## 2020-10-29 DIAGNOSIS — Z78.0 MENOPAUSE: ICD-10-CM

## 2020-10-29 DIAGNOSIS — J45.30 MILD PERSISTENT ASTHMA WITHOUT COMPLICATION: ICD-10-CM

## 2020-10-29 DIAGNOSIS — G47.33 OSA ON CPAP: ICD-10-CM

## 2020-10-29 DIAGNOSIS — I10 ESSENTIAL HYPERTENSION: ICD-10-CM

## 2020-10-29 DIAGNOSIS — E78.00 PURE HYPERCHOLESTEROLEMIA: ICD-10-CM

## 2020-10-29 DIAGNOSIS — R73.03 PREDIABETES: ICD-10-CM

## 2020-10-29 DIAGNOSIS — E66.9 OBESITY (BMI 30-39.9): ICD-10-CM

## 2020-10-29 PROCEDURE — G0008 ADMIN INFLUENZA VIRUS VAC: HCPCS | Performed by: FAMILY MEDICINE

## 2020-10-29 PROCEDURE — 90662 IIV NO PRSV INCREASED AG IM: CPT | Performed by: FAMILY MEDICINE

## 2020-10-29 PROCEDURE — 99214 OFFICE O/P EST MOD 30 MIN: CPT | Mod: 25 | Performed by: FAMILY MEDICINE

## 2020-10-29 RX ORDER — TELMISARTAN 40 MG/1
40 TABLET ORAL
Qty: 90 TAB | Refills: 3 | Status: SHIPPED | OUTPATIENT
Start: 2020-10-29 | End: 2022-01-06

## 2020-10-29 RX ORDER — ATORVASTATIN CALCIUM 20 MG/1
20 TABLET, FILM COATED ORAL
Qty: 90 TAB | Refills: 3 | Status: SHIPPED | OUTPATIENT
Start: 2020-10-29 | End: 2020-11-17 | Stop reason: SDUPTHER

## 2020-10-29 ASSESSMENT — FIBROSIS 4 INDEX: FIB4 SCORE: 0.89

## 2020-10-29 ASSESSMENT — PATIENT HEALTH QUESTIONNAIRE - PHQ9: CLINICAL INTERPRETATION OF PHQ2 SCORE: 0

## 2020-10-30 NOTE — PROGRESS NOTES
Subjective:   CC: HTN follow up     HPI:     Lauren Esquivel is a 70 y.o. female, established patient of the clinic, presents with the following concerns:     Pt has hx of SENTHIL, HTN, HLD, mild persistent asthma, prediabetes, obesity. She is taking all meds as directed except for Advair. She states that she only takes Advair once daily at night. Her asthma is under good control. She needs referral to establish care with new Pulmonologist as her current doctor is retired. She is active and tries to exercise regularly. She denies hx of drugs, alcohol, tobacco abuse.     Pt forgot to have blood tests done prior to OV.     Current medicines (including changes today)  Current Outpatient Medications   Medication Sig Dispense Refill   • atorvastatin (LIPITOR) 20 MG Tab Take 1 Tab by mouth every bedtime. 90 Tab 3   • telmisartan (MICARDIS) 40 MG Tab Take 1 Tab by mouth every day. 90 Tab 3   • fluticasone-salmeterol (ADVAIR) 250-50 MCG/DOSE AEROSOL POWDER, BREATH ACTIVATED Inhale 1 Puff by mouth 2 times a day. 60 Each 5   • albuterol (PROVENTIL) 2.5mg/3ml Nebu Soln solution for nebulization 3 mL by Nebulization route every 6 hours as needed for Shortness of Breath. 75 mL 0   • albuterol 108 (90 Base) MCG/ACT Aero Soln inhalation aerosol Inhale 2 Puffs by mouth every 6 hours as needed for Shortness of Breath. 8.5 g 2     No current facility-administered medications for this visit.      She  has a past medical history of Anemia, Anxiety state, unspecified (1/3/2010), ASTHMA, Chronic rhinitis (1/3/2010), COPD, Elevated C-reactive protein (CRP) (2/6/2012), H/O pyelonephritis (8/21/2012), H/O: female infertility (8/10/2011), History of nephrolithiasis (8/21/2012), HTN, HTN (hypertension) (8/11/2010), Hyperglycemia (6/4/2012), Hyperlipidemia (8/28/2010), Influenza A with pneumonia (12/27/2016), Kidney filling defect (8/21/2012), Laryngeal spasm (1/3/2010), Metabolic syndrome (6/4/2012), Mycoplasma pneumonia (2003), Obesity  "(6/4/2012), Preventative health care (8/11/2010), Pyelonephritis (7/30/2012), Respiratory malfunction arising from mental factors (1/3/2010), Sleep apnea, Vitamin d deficiency (8/28/2010), and Wheeze (12/4/2009). She also has no past medical history of Encounter for long-term (current) use of other medications.    I personally reviewed patient's problem list, allergies, medications, family hx, social hx with patient and update EPIC.     REVIEW OF SYSTEMS:  CONSTITUTIONAL:  Denies night sweats, fatigue, malaise, lethargy, fever or chills.  RESPIRATORY:  Denies cough, wheeze, hemoptysis, or shortness of breath.  CARDIOVASCULAR:  Denies chest pains, palpitations, pedal edema     Objective:     /76 (BP Location: Left arm, Patient Position: Sitting, BP Cuff Size: Adult long)   Pulse 68   Temp 36.8 °C (98.2 °F) (Temporal)   Ht 1.676 m (5' 6\")   Wt 103 kg (227 lb)   SpO2 94%  Body mass index is 36.64 kg/m².    Physical Exam:  Constitutional: awake, alert, in no distress.  Skin: Warm, dry, good turgor, no rashes, bruises, ulcers in visible areas.  Eye: conjunctiva clear, lids neg for edema or lesions.  Neck: Trachea midline, no masses, no thyromegaly. No cervical or supraclavicular lymphadenopathy  Respiratory: Unlabored respiratory effort, lungs clear to auscultation, no wheezes, no rales.  Cardiovascular: Normal S1, S2, no pedal edema.  - grade 2/6 systolic ejection murmur best heard at the R upper sternum.  Psych: Oriented x3, affect and mood wnl, intact judgement and insight.       Assessment and Plan:   The following treatment plan was discussed    1. SENTHIL on CPAP  - cont CPAP nightly   - REFERRAL TO PULMONARY AND SLEEP MEDICINE General Pulmonary, Sleep Medicine    2. Essential hypertension  Chronic, controlled with Telmisartan 40 mg qd, no s/e reported, will continue.    - telmisartan (MICARDIS) 40 MG Tab; Take 1 Tab by mouth every day.  Dispense: 90 Tab; Refill: 3  - pt will have annual labs done next week. "     3. Pure hypercholesterolemia  Chronic, taking Lipitor 20 mg qd, due for blood tests , no s/e reported, will continue.    - atorvastatin (LIPITOR) 20 MG Tab; Take 1 Tab by mouth every bedtime.  Dispense: 90 Tab; Refill: 3  - pt will have annual labs done next week.   - dietary modification, exercise, and weight loss.   - avoid alcohol, drugs, tobacco products     4. Mild persistent asthma without complication, Dr Borja  - cont Proventil neb or Albuterol inhaler PRN   - fluticasone-salmeterol (ADVAIR) 250-50 MCG/DOSE AEROSOL POWDER, BREATH ACTIVATED; Inhale 1 Puff by mouth 2 times a day.  Dispense: 60 Each; Refill: 5  - REFERRAL TO PULMONARY AND SLEEP MEDICINE General Pulmonary, Sleep Medicine    5. Prediabetes  - Pt was counseled on dietary modification, weight loss   - Recommended moderate intensity exercise at least 30 minutes per day x 5 days per week.  - Follow up in 6 months.     6. Obesity (BMI 30-39.9)  - Patient identified as having weight management issue.  Appropriate orders and counseling given.    7. Menopause  - DS-BONE DENSITY STUDY (DEXA); Future    8. Heart murmur  - EC-ECHOCARDIOGRAM COMPLETE W/O CONT; Future    9. Need for vaccination  - INFLUENZA VACCINE, HIGH DOSE (65+ ONLY)      Yessy Paz M.D.      Followup: Return in about 1 year (around 10/29/2021) for Annual wellness visit.    Please note that this dictation was created using voice recognition software. I have made every reasonable attempt to correct obvious errors, but I expect that there are errors of grammar and possibly content that I did not discover before finalizing the note.

## 2020-11-03 ENCOUNTER — HOSPITAL ENCOUNTER (OUTPATIENT)
Dept: LAB | Facility: MEDICAL CENTER | Age: 70
End: 2020-11-03
Attending: FAMILY MEDICINE
Payer: MEDICARE

## 2020-11-03 DIAGNOSIS — R73.03 PREDIABETES: ICD-10-CM

## 2020-11-03 DIAGNOSIS — I10 ESSENTIAL HYPERTENSION: ICD-10-CM

## 2020-11-03 DIAGNOSIS — E78.00 PURE HYPERCHOLESTEROLEMIA: ICD-10-CM

## 2020-11-03 LAB
ALBUMIN SERPL BCP-MCNC: 4.2 G/DL (ref 3.2–4.9)
ALBUMIN/GLOB SERPL: 1.5 G/DL
ALP SERPL-CCNC: 52 U/L (ref 30–99)
ALT SERPL-CCNC: 23 U/L (ref 2–50)
ANION GAP SERPL CALC-SCNC: 11 MMOL/L (ref 7–16)
AST SERPL-CCNC: 16 U/L (ref 12–45)
BASOPHILS # BLD AUTO: 1.1 % (ref 0–1.8)
BASOPHILS # BLD: 0.07 K/UL (ref 0–0.12)
BILIRUB SERPL-MCNC: 0.3 MG/DL (ref 0.1–1.5)
BUN SERPL-MCNC: 19 MG/DL (ref 8–22)
CALCIUM SERPL-MCNC: 9.2 MG/DL (ref 8.5–10.5)
CHLORIDE SERPL-SCNC: 103 MMOL/L (ref 96–112)
CHOLEST SERPL-MCNC: 231 MG/DL (ref 100–199)
CO2 SERPL-SCNC: 26 MMOL/L (ref 20–33)
CREAT SERPL-MCNC: 0.61 MG/DL (ref 0.5–1.4)
EOSINOPHIL # BLD AUTO: 0.44 K/UL (ref 0–0.51)
EOSINOPHIL NFR BLD: 6.8 % (ref 0–6.9)
ERYTHROCYTE [DISTWIDTH] IN BLOOD BY AUTOMATED COUNT: 43.2 FL (ref 35.9–50)
EST. AVERAGE GLUCOSE BLD GHB EST-MCNC: 131 MG/DL
FASTING STATUS PATIENT QL REPORTED: NORMAL
GLOBULIN SER CALC-MCNC: 2.8 G/DL (ref 1.9–3.5)
GLUCOSE SERPL-MCNC: 97 MG/DL (ref 65–99)
HBA1C MFR BLD: 6.2 % (ref 0–5.6)
HCT VFR BLD AUTO: 42.5 % (ref 37–47)
HDLC SERPL-MCNC: 58 MG/DL
HGB BLD-MCNC: 13.6 G/DL (ref 12–16)
IMM GRANULOCYTES # BLD AUTO: 0.03 K/UL (ref 0–0.11)
IMM GRANULOCYTES NFR BLD AUTO: 0.5 % (ref 0–0.9)
LDLC SERPL CALC-MCNC: 157 MG/DL
LYMPHOCYTES # BLD AUTO: 1.9 K/UL (ref 1–4.8)
LYMPHOCYTES NFR BLD: 29.3 % (ref 22–41)
MCH RBC QN AUTO: 28 PG (ref 27–33)
MCHC RBC AUTO-ENTMCNC: 32 G/DL (ref 33.6–35)
MCV RBC AUTO: 87.4 FL (ref 81.4–97.8)
MONOCYTES # BLD AUTO: 0.5 K/UL (ref 0–0.85)
MONOCYTES NFR BLD AUTO: 7.7 % (ref 0–13.4)
NEUTROPHILS # BLD AUTO: 3.55 K/UL (ref 2–7.15)
NEUTROPHILS NFR BLD: 54.6 % (ref 44–72)
NRBC # BLD AUTO: 0 K/UL
NRBC BLD-RTO: 0 /100 WBC
PLATELET # BLD AUTO: 314 K/UL (ref 164–446)
PMV BLD AUTO: 10.7 FL (ref 9–12.9)
POTASSIUM SERPL-SCNC: 3.8 MMOL/L (ref 3.6–5.5)
PROT SERPL-MCNC: 7 G/DL (ref 6–8.2)
RBC # BLD AUTO: 4.86 M/UL (ref 4.2–5.4)
SODIUM SERPL-SCNC: 140 MMOL/L (ref 135–145)
TRIGL SERPL-MCNC: 79 MG/DL (ref 0–149)
WBC # BLD AUTO: 6.5 K/UL (ref 4.8–10.8)

## 2020-11-03 PROCEDURE — 83036 HEMOGLOBIN GLYCOSYLATED A1C: CPT | Mod: GA

## 2020-11-03 PROCEDURE — 80053 COMPREHEN METABOLIC PANEL: CPT

## 2020-11-03 PROCEDURE — 36415 COLL VENOUS BLD VENIPUNCTURE: CPT

## 2020-11-03 PROCEDURE — 80061 LIPID PANEL: CPT

## 2020-11-03 PROCEDURE — 85025 COMPLETE CBC W/AUTO DIFF WBC: CPT

## 2020-11-17 ENCOUNTER — TELEMEDICINE (OUTPATIENT)
Dept: MEDICAL GROUP | Age: 70
End: 2020-11-17
Payer: MEDICARE

## 2020-11-17 VITALS — BODY MASS INDEX: 35.68 KG/M2 | WEIGHT: 222 LBS | HEIGHT: 66 IN

## 2020-11-17 DIAGNOSIS — I10 ESSENTIAL HYPERTENSION: ICD-10-CM

## 2020-11-17 DIAGNOSIS — R73.03 PREDIABETES: ICD-10-CM

## 2020-11-17 DIAGNOSIS — E78.00 PURE HYPERCHOLESTEROLEMIA: Primary | ICD-10-CM

## 2020-11-17 PROCEDURE — 99214 OFFICE O/P EST MOD 30 MIN: CPT | Mod: 95,CR | Performed by: FAMILY MEDICINE

## 2020-11-17 RX ORDER — ATORVASTATIN CALCIUM 20 MG/1
20 TABLET, FILM COATED ORAL
Qty: 90 TAB | Refills: 3 | Status: SHIPPED | OUTPATIENT
Start: 2020-11-17 | End: 2022-01-20 | Stop reason: SDUPTHER

## 2020-11-17 ASSESSMENT — FIBROSIS 4 INDEX: FIB4 SCORE: 0.75

## 2020-11-17 NOTE — PROGRESS NOTES
Virtual Visit: Established Patient   This visit was conducted via Zoom using secure and encrypted videoconferencing technology. The patient was in a private location in the state of [place].    The patient's identity was confirmed and verbal consent was obtained for this virtual visit.    Subjective:   CC: Hyperlipidemia follow-up    Lauren Esquivel is a 70 y.o. female with history of essential hypertension, hyperlipidemia, and prediabetes.  Patient is currently taking telmisartan 40 mg daily.  She states that her home blood pressure has been within normal limits.  Her most recent blood test shows worsening A1c at 6.2.  At previous office visit, patient was started on Lipitor 20 mg daily.  However, patient states that she has never taken this medication.  Her most recent blood test shows markedly elevated total cholesterol and LDL.  Negative history of drugs, alcohol, tobacco abuse.  Patient is obese and is on keto diet to try to lose weight.  She lost approximately 5 pounds since last office visit.      ROS   Denies any recent fevers or chills. No nausea or vomiting. No chest pains or shortness of breath.     Allergies   Allergen Reactions   • Ace Inhibitors      Cough   • Ciprofloxacin        Current medicines (including changes today)  Current Outpatient Medications   Medication Sig Dispense Refill   • atorvastatin (LIPITOR) 20 MG Tab Take 1 Tab by mouth every bedtime. 90 Tab 3   • telmisartan (MICARDIS) 40 MG Tab Take 1 Tab by mouth every day. 90 Tab 3   • fluticasone-salmeterol (ADVAIR) 250-50 MCG/DOSE AEROSOL POWDER, BREATH ACTIVATED Inhale 1 Puff by mouth 2 times a day. 60 Each 5   • albuterol (PROVENTIL) 2.5mg/3ml Nebu Soln solution for nebulization 3 mL by Nebulization route every 6 hours as needed for Shortness of Breath. 75 mL 0   • albuterol 108 (90 Base) MCG/ACT Aero Soln inhalation aerosol Inhale 2 Puffs by mouth every 6 hours as needed for Shortness of Breath. 8.5 g 2     No current  "facility-administered medications for this visit.        Patient Active Problem List    Diagnosis Date Noted   • Heart murmur 10/29/2020   • Prediabetes 10/03/2017   • Obesity (BMI 30-39.9) 10/03/2017   • Mild persistent asthma without complication, Dr Borja 01/03/2017   • History of nephrolithiasis 08/21/2012   • Hyperlipidemia 08/28/2010   • HTN (hypertension) 08/11/2010   • SENTHIL on CPAP        Family History   Problem Relation Age of Onset   • Lung Disease Mother    • Hypertension Mother    • Heart Disease Father    • Hypertension Maternal Grandfather    • Stroke Brother    • Heart Disease Brother        She  has a past medical history of Anemia, Anxiety state, unspecified (1/3/2010), ASTHMA, Chronic rhinitis (1/3/2010), COPD, Elevated C-reactive protein (CRP) (2/6/2012), H/O pyelonephritis (8/21/2012), H/O: female infertility (8/10/2011), History of nephrolithiasis (8/21/2012), HTN, HTN (hypertension) (8/11/2010), Hyperglycemia (6/4/2012), Hyperlipidemia (8/28/2010), Influenza A with pneumonia (12/27/2016), Kidney filling defect (8/21/2012), Laryngeal spasm (1/3/2010), Metabolic syndrome (6/4/2012), Mycoplasma pneumonia (2003), Obesity (6/4/2012), Preventative health care (8/11/2010), Pyelonephritis (7/30/2012), Respiratory malfunction arising from mental factors (1/3/2010), Sleep apnea, Vitamin d deficiency (8/28/2010), and Wheeze (12/4/2009). She also has no past medical history of Encounter for long-term (current) use of other medications.  She  has a past surgical history that includes laparoscopy and tonsillectomy and adenoidectomy.       Objective:   Ht 1.676 m (5' 6\")   Wt 100.7 kg (222 lb)   LMP 06/04/2005 (LMP Unknown)   BMI 35.83 kg/m²     Physical Exam:  Constitutional: Alert, no distress, well-groomed.  Skin: No rashes in visible areas.  Eye: Round. Conjunctiva clear, lids normal. No icterus.   ENMT: Lips pink without lesions, good dentition, moist mucous membranes. Phonation normal.  Neck: No " masses, no thyromegaly. Moves freely without pain.  Respiratory: Unlabored respiratory effort, no cough or audible wheeze  Psych: Alert and oriented x3, normal affect and mood.       Assessment and Plan:   The following treatment plan was discussed:     1. Essential hypertension  Chronic, controlled with olmesartan 40 mg daily.  Home blood pressure has been within normal limits.  Patient tolerated medication well, no side effects reported, will continue.  - dietary modification, exercise, and weight loss.   - avoid alcohol, drugs, tobacco products     2. Pure hypercholesterolemia  Chronic, previously started on Lipitor 20 mg daily, however, patient has not started taking this medication.  Her most recent blood test showed elevated total cholesterol and LDL.  10-year CVD risk is about 12%.  I discussed recommendation of the American Heart Association and [place] preventative task force regarding statin.  Patient is agreeable to start statin.  Patient will continue to work on dietary and lifestyle modification for weight loss.  She is hoping that she is able to wean off statin once she achieves adequate weight loss and lifestyle changes.- atorvastatin (LIPITOR) 20 MG Tab; Take 1 Tab by mouth every bedtime.  Dispense: 90 Tab; Refill: 3  - Lipid Profile; Future  - dietary modification, exercise, and weight loss.   - avoid alcohol, drugs, tobacco products     3. Prediabetes  Chronic worsening A1c.  Her A1c increases from 6.0 last year to 6.2 this year.  I discussed the risks developing type 2 diabetes and potential associated health complications.  Recommended referral to diabetic prevention program.  Patient is agreeable.     - HEMOGLOBIN A1C; Future  - REFERRAL TO [facility] [place] HEALTH IMPROVEMENT PROGRAMS (HIP)  - f/u in 6 months          Follow-up: Return in about 6 months (around 5/17/2021) for Multiple issues.

## 2020-12-22 ENCOUNTER — HOSPITAL ENCOUNTER (OUTPATIENT)
Dept: CARDIOLOGY | Facility: MEDICAL CENTER | Age: 70
End: 2020-12-22
Attending: FAMILY MEDICINE
Payer: MEDICARE

## 2020-12-22 DIAGNOSIS — R01.1 HEART MURMUR: ICD-10-CM

## 2020-12-22 LAB
LV EJECT FRACT  99904: 70
LV EJECT FRACT MOD 2C 99903: 63.26
LV EJECT FRACT MOD 4C 99902: 66.11
LV EJECT FRACT MOD BP 99901: 64.32

## 2020-12-22 PROCEDURE — 93306 TTE W/DOPPLER COMPLETE: CPT

## 2020-12-22 PROCEDURE — 93306 TTE W/DOPPLER COMPLETE: CPT | Mod: 26 | Performed by: INTERNAL MEDICINE

## 2021-01-06 ENCOUNTER — NON-PROVIDER VISIT (OUTPATIENT)
Dept: MEDICAL GROUP | Age: 71
End: 2021-01-06
Payer: MEDICARE

## 2021-01-06 VITALS — BODY MASS INDEX: 36.15 KG/M2 | WEIGHT: 224 LBS

## 2021-01-06 DIAGNOSIS — R73.03 PREDIABETES: ICD-10-CM

## 2021-01-06 DIAGNOSIS — E66.9 OBESITY (BMI 30-39.9): ICD-10-CM

## 2021-01-06 PROCEDURE — G0447 BEHAVIOR COUNSEL OBESITY 15M: HCPCS | Performed by: FAMILY MEDICINE

## 2021-01-06 ASSESSMENT — FIBROSIS 4 INDEX: FIB4 SCORE: 0.75

## 2021-01-06 NOTE — PROGRESS NOTES
"IBT INITIAL ASSESMENT    Author: Madhuri Contreras R.D. Date & Time created: 1/6/2021  1:39 PM   Visit #: 1  Referring Provider: Yessy Paz M.D.  Patient Age: 70 y.o.  Time in/Out:  1:04-1:20    This evaluation was conducted via Zoom using secure and encrypted videoconferencing technology. The patient was in a private location in the [facility].    The patient's identity was confirmed and verbal consent was obtained for this virtual visit.      ASSESS:  Vitals:    01/06/21 1330   Weight: 101.6 kg (224 lb)      Vitals:    01/06/21 1330   Weight: 101.6 kg (224 lb)    Body mass index is 36.15 kg/m². Goal Weight:  160-180 lbs    The patient states wanting to be healthier and reducing joint pain as a motivator for weight loss. She has tried Weight Watchers and diets such as intermittent fasting in the past, currently she is on a keto diet.  Comorbidities include prediabetes, SENTHIL on CPAP, HLD, obesity.       Current Dietary/Exercise Habits  Lauren lives with her  and does the cooking and grocery shopping for the household. She eats 2 full meals/day and snacks often, and she avoids dairy d/t a stated allergy (cheese and cream ok - likely more of an intolerance). Currently she is following a keto diet, but she says it's a \"lazy keto\" which allows for highly processed and packaged foods. Lauren is not getting structured physical activity. She is somewhat limited by asthma, so she does not walk outside in the cold. She also has joint pain which makes walking down stairs difficult. She has been tracking her steps using a phone manuela, and she typically walks ~2500 steps/day    Estimated Stage of Change   Preparationas evidenced by initiation of IBT program and willingness to track intake  .  ADVISE:    Physical Activity:  Not addressed at this visit     Dietary Guidelines:  Keep detailed food log and send to RD prior to next visit    The patient has been advised of how weight management and physical activity " impacts their health and will help to reduce complications and health risk factors.    AGREE:  Lauren is agreeable to tracking her food intake    ASSIST:  Lauren is interested in losing weight to better her health and reduce joint pain. I explained my role as a nutrition educator to guide her on healthy food choices rather than telling her what to eat and when, which we will do in the context of following a ketogenic diet. I asked Lauren to track her food intake so that I can gauge her food choices and portion sizes, and also to increase her own self-awareness. Lauren did not identify any particular areas of interest that she wants to learn more about. Pt verbalized understanding and all of his/her nutrition-related questions were answered    ARRANGE:     Return for follow-up in 1 week   The patient was assisted in making follow-up appointment per orders.

## 2021-01-15 DIAGNOSIS — Z23 NEED FOR VACCINATION: ICD-10-CM

## 2021-01-19 ENCOUNTER — HOSPITAL ENCOUNTER (OUTPATIENT)
Dept: RADIOLOGY | Facility: MEDICAL CENTER | Age: 71
End: 2021-01-19
Attending: FAMILY MEDICINE
Payer: MEDICARE

## 2021-01-19 DIAGNOSIS — Z78.0 MENOPAUSE: ICD-10-CM

## 2021-01-19 PROCEDURE — 77080 DXA BONE DENSITY AXIAL: CPT

## 2021-01-20 ENCOUNTER — NON-PROVIDER VISIT (OUTPATIENT)
Dept: MEDICAL GROUP | Age: 71
End: 2021-01-20
Payer: MEDICARE

## 2021-01-20 VITALS — BODY MASS INDEX: 35.83 KG/M2 | WEIGHT: 222 LBS

## 2021-01-20 DIAGNOSIS — R73.03 PREDIABETES: ICD-10-CM

## 2021-01-20 DIAGNOSIS — E66.9 OBESITY (BMI 30-39.9): ICD-10-CM

## 2021-01-20 PROCEDURE — G0447 BEHAVIOR COUNSEL OBESITY 15M: HCPCS | Mod: 95,CR | Performed by: FAMILY MEDICINE

## 2021-01-20 ASSESSMENT — FIBROSIS 4 INDEX: FIB4 SCORE: 0.75

## 2021-01-20 NOTE — PROGRESS NOTES
"1/20/2021     Visit #2       No ref. provider found      70 y.o.           Time in/Out:  1:07-1:23    This evaluation was conducted via Zoom using secure and encrypted videoconferencing technology. The patient was in a private location in the [facility].    The patient's identity was confirmed and verbal consent was obtained for this virtual visit.      ASSESS:    Vitals:    01/20/21 1325   Weight: 100.7 kg (222 lb)            Wt Readings from Last 2 Encounters:   01/20/21 100.7 kg (222 lb)   01/06/21 101.6 kg (224 lb)            Body mass index is 35.83 kg/m².        Starting Weight: 224 lbs (1/6/21)   Difference: -2 lbs  Total Weight Change: -2 lbs       Current Dietary/Exercise Habits:  Lauren wrote down her intake starting since last Saturday but did not send to me to review. She says that she is \"trying harder on my diet\" - cooking more keto recipes and avoiding CHO, and she is trying to do intermittent fasting by not eating until at leas 11am. Lauren says that she took a walk on Sunday around [facility], and typically she is walking ~2185-9772 steps/day.     Estimated Stage of Change:    Action as evidenced by consistent effort to do ketogenic diet and food tracking.      ADVISE:      Physical Activity:  Not addressed at this visit     Dietary Guidelines:  Choose lean meats and unsaturated fats for fewer kcals and for heart health.    The patient has been advised of how weight management and physical activity impacts their health and will help to reduce complications and health risk factors.        AGREE:  No specific goals were set at this visit.       ASSIST:  Lauren is going to send me her food log via email for me to review and discuss at her next visit. Lauren likes doing keto and wants to continue, and she realizes that it needs to be followed strictly in order to stay in ketosis; she is not testing her urine, rather she is just avoiding CHO. I recommended that Lauren choose lean meats and " unsaturated fats, and I described sources of saturated fats and proteins that are leaner. At next visit would like to review food log, reinforce the importance of lean meats/unsaturated fats, and setting a specific step goal to aim for. Pt verbalized understanding and all of his/her nutrition-related questions were answered      ARRANGE:     Return for follow-up in 2 weeks     The patient was assisted in making follow-up appointment per orders.

## 2021-05-17 ENCOUNTER — TELEPHONE (OUTPATIENT)
Dept: MEDICAL GROUP | Age: 71
End: 2021-05-17

## 2021-05-17 NOTE — TELEPHONE ENCOUNTER
ESTABLISHED PATIENT PRE-VISIT PLANNING   Monday 05/17/2021@9:48AM:    Annual Wellness Visit Over Due    Phone Number Called: [phone] (home) [phone] (work)  Call outcome: No Answer. Left Voice Message.  Message: Advised office visit with  scheduled Tuesday, 05/18/21@1:30PM, [address]. Gave virtual visit options.    Patient was contacted to complete PVP.     Note: Patient will not be contacted if there is no indication to call.     1.  Reviewed notes from the last few office visits within the medical group: Yes    2.  If any orders were placed at last visit or intended to be done for this visit (i.e. 6 mos follow-up), do we have Results/Consult Notes?         •  Labs - Labs ordered, NOT completed. Patient advised to complete prior to next appointment.  Note: If patient appointment is for lab review and patient did not complete labs, check with provider if OK to reschedule patient until labs completed.       •  Imaging - Imaging ordered, completed and results are in chart.       •  Referrals - Referral ordered, patient has NOT been seen.    3. Is this appointment scheduled as a Hospital Follow-Up? No    4.  Immunizations were updated in Epic using Reconcile Outside Information activity? Yes    5.  Patient is due for the following Health Maintenance Topics:   Health Maintenance Due   Topic Date Due   • IMM ZOSTER VACCINES (1 of 2) Never done   • Annual Wellness Visit  10/29/2020   • MAMMOGRAM  01/06/2021       6.  AHA (Pulse8) form printed for Provider? N/A

## 2021-05-25 LAB
CHOLEST SERPL-MCNC: 161 MG/DL (ref 100–199)
HBA1C MFR BLD: 6.2 % (ref 4.8–5.6)
HDLC SERPL-MCNC: 57 MG/DL
LABORATORY COMMENT REPORT: NORMAL
LDLC SERPL CALC-MCNC: 83 MG/DL (ref 0–99)
TRIGL SERPL-MCNC: 118 MG/DL (ref 0–149)
VLDLC SERPL CALC-MCNC: 21 MG/DL (ref 5–40)

## 2021-06-23 ENCOUNTER — TELEPHONE (OUTPATIENT)
Dept: MEDICAL GROUP | Age: 71
End: 2021-06-23

## 2021-06-23 NOTE — TELEPHONE ENCOUNTER
ESTABLISHED PATIENT PRE-VISIT PLANNING   Wednesday, 6/23/21@10:19AM:    Patient was NOT contacted to complete PVP.     Note: Patient will not be contacted if there is no indication to call.     1.  Reviewed notes from the last few office visits within the medical group: Yes    2.  If any orders were placed at last visit or intended to be done for this visit (i.e. 6 mos follow-up), do we have Results/Consult Notes?         •  Labs - Labs ordered, completed on 05/23/21 and results are in chart.  Note: If patient appointment is for lab review and patient did not complete labs, check with provider if OK to reschedule patient until labs completed.       •  Imaging - Imaging ordered, completed and results are in chart.       •  Referrals - Referral ordered, patient has NOT been seen.    -Pulmonary and Sleep: Not Done/ Not Scheduled    3. Is this appointment scheduled as a Hospital Follow-Up? No    4.  Immunizations were updated in Epic using Reconcile Outside Information activity? Yes    5.  Patient is due for the following Health Maintenance Topics:   Health Maintenance Due   Topic Date Due   • IMM ZOSTER VACCINES (1 of 2) Never done   • Annual Wellness Visit  10/29/2020   • MAMMOGRAM  01/06/2021       6.  AHA (Pulse8) form printed for Provider? N/A

## 2021-06-24 ENCOUNTER — TELEMEDICINE (OUTPATIENT)
Dept: MEDICAL GROUP | Age: 71
End: 2021-06-24
Payer: MEDICARE

## 2021-06-24 VITALS — HEIGHT: 66 IN | BODY MASS INDEX: 35.68 KG/M2 | WEIGHT: 222 LBS

## 2021-06-24 DIAGNOSIS — Z23 NEED FOR VACCINATION: ICD-10-CM

## 2021-06-24 DIAGNOSIS — E78.00 PURE HYPERCHOLESTEROLEMIA: Primary | ICD-10-CM

## 2021-06-24 DIAGNOSIS — K13.21 LEUKOPLAKIA, TONGUE: ICD-10-CM

## 2021-06-24 DIAGNOSIS — I10 ESSENTIAL HYPERTENSION: ICD-10-CM

## 2021-06-24 DIAGNOSIS — Z12.31 ENCOUNTER FOR SCREENING MAMMOGRAM FOR BREAST CANCER: ICD-10-CM

## 2021-06-24 DIAGNOSIS — R73.03 PREDIABETES: ICD-10-CM

## 2021-06-24 PROCEDURE — 99214 OFFICE O/P EST MOD 30 MIN: CPT | Mod: 95,CR | Performed by: FAMILY MEDICINE

## 2021-06-24 RX ORDER — METFORMIN HYDROCHLORIDE 500 MG/1
500 TABLET, EXTENDED RELEASE ORAL 2 TIMES DAILY
Qty: 180 TABLET | Refills: 3 | Status: SHIPPED | OUTPATIENT
Start: 2021-06-24 | End: 2022-07-19 | Stop reason: SDUPTHER

## 2021-06-24 ASSESSMENT — PATIENT HEALTH QUESTIONNAIRE - PHQ9: CLINICAL INTERPRETATION OF PHQ2 SCORE: 0

## 2021-06-24 ASSESSMENT — FIBROSIS 4 INDEX: FIB4 SCORE: 0.75

## 2021-06-24 NOTE — PROGRESS NOTES
Virtual Visit: Established Patient   This visit was conducted via Zoom using secure and encrypted videoconferencing technology. The patient was in a private location in the state of [place].    The patient's identity was confirmed and verbal consent was obtained for this virtual visit.    Subjective:   CC: hyperlipidemia follow up     Lauren Esquivel is a 70 y.o. female with chronic prediabetes, hyperlipidemia, hypertension.  Patient recently was started on Lipitor 20 mg daily.  She tolerated medication well, no side effect reported.  She continues to take telmisartan 40 mg daily for hypertension.  Home blood pressure has been within normal limits.  Her A1c remains at 6.2.    Patient has a chronic, non-changing lesion on the right lateral side of her tongue.  Patient will have biopsy with oral surgeon in near future.      ROS   Denies any recent fevers or chills. No nausea or vomiting. No chest pains or shortness of breath.     Allergies   Allergen Reactions   • Ace Inhibitors      Cough   • Ciprofloxacin        Current medicines (including changes today)  Current Outpatient Medications   Medication Sig Dispense Refill   • metFORMIN ER (GLUCOPHAGE XR) 500 MG TABLET SR 24 HR Take 1 tablet by mouth 2 times a day. 180 tablet 3   • atorvastatin (LIPITOR) 20 MG Tab Take 1 Tab by mouth every bedtime. 90 Tab 3   • telmisartan (MICARDIS) 40 MG Tab Take 1 Tab by mouth every day. 90 Tab 3   • fluticasone-salmeterol (ADVAIR) 250-50 MCG/DOSE AEROSOL POWDER, BREATH ACTIVATED Inhale 1 Puff by mouth 2 times a day. 60 Each 5   • albuterol (PROVENTIL) 2.5mg/3ml Nebu Soln solution for nebulization 3 mL by Nebulization route every 6 hours as needed for Shortness of Breath. 75 mL 0   • albuterol 108 (90 Base) MCG/ACT Aero Soln inhalation aerosol Inhale 2 Puffs by mouth every 6 hours as needed for Shortness of Breath. 8.5 g 2     No current facility-administered medications for this visit.       Patient Active Problem List     "Diagnosis Date Noted   • Heart murmur 10/29/2020   • Prediabetes 10/03/2017   • Obesity (BMI 30-39.9) 10/03/2017   • Mild persistent asthma without complication, Dr Borja 01/03/2017   • History of nephrolithiasis 08/21/2012   • Hyperlipidemia 08/28/2010   • HTN (hypertension) 08/11/2010   • SENTHIL on CPAP        Family History   Problem Relation Age of Onset   • Lung Disease Mother    • Hypertension Mother    • Heart Disease Father    • Hypertension Maternal Grandfather    • Stroke Brother    • Heart Disease Brother        She  has a past medical history of Anemia, Anxiety state, unspecified (1/3/2010), ASTHMA, Chronic rhinitis (1/3/2010), COPD, Elevated C-reactive protein (CRP) (2/6/2012), H/O pyelonephritis (8/21/2012), H/O: female infertility (8/10/2011), History of nephrolithiasis (8/21/2012), HTN, HTN (hypertension) (8/11/2010), Hyperglycemia (6/4/2012), Hyperlipidemia (8/28/2010), Influenza A with pneumonia (12/27/2016), Kidney filling defect (8/21/2012), Laryngeal spasm (1/3/2010), Metabolic syndrome (6/4/2012), Mycoplasma pneumonia (2003), Obesity (6/4/2012), Preventative health care (8/11/2010), Pyelonephritis (7/30/2012), Respiratory malfunction arising from mental factors (1/3/2010), Sleep apnea, Vitamin d deficiency (8/28/2010), and Wheeze (12/4/2009). She also has no past medical history of Encounter for long-term (current) use of other medications.  She  has a past surgical history that includes laparoscopy and tonsillectomy and adenoidectomy.       Objective:   Ht 1.676 m (5' 6\")   Wt 101 kg (222 lb) Comment: pt stated  LMP 06/04/2005 (LMP Unknown)   BMI 35.83 kg/m²     Physical Exam:  Constitutional: Alert, no distress, well-groomed.  Skin: No rashes in visible areas.  Eye: Round. Conjunctiva clear, lids normal. No icterus.   ENMT: Lips pink without lesions, good dentition, moist mucous membranes. Phonation normal.  Neck: No masses, no thyromegaly. Moves freely without pain.  Respiratory: Unlabored " respiratory effort, no cough or audible wheeze  Psych: Alert and oriented x3, normal affect and mood.       Assessment and Plan:   The following treatment plan was discussed:     1. Essential hypertension  Chronic, controlled with Telmisartan 40 mg qd, no s/e reported, will continue.    - CBC WITH DIFFERENTIAL; Future  - Comp Metabolic Panel; Future  - MICROALBUMIN CREAT RATIO URINE; Future    2. Pure hypercholesterolemia  Chronic, controlled with Lipitor 20 mg qd, no s/e reported, will continue.      3. Prediabetes  Chronic, A1C at 6.2 per most recent labs, pt is asymptomatic.   - metFORMIN ER (GLUCOPHAGE XR) 500 MG TABLET SR 24 HR; Take 1 tablet by mouth 2 times a day.  Dispense: 180 tablet; Refill: 3  - HEMOGLOBIN A1C; Future    4. Leukoplakia, tongue  Chronic, unchanged, whitish lesion on the R lateral tongue  Will have biopsy with oral surgeon in near future.     5. Encounter for screening mammogram for breast cancer  - MA-SCREENING MAMMO BILAT W/CAD; Future    6. Need for vaccination  - Shingles Vaccine (SHINGRIX); Future       Follow-up: Return in about 6 months (around 12/24/2021) for Annual wellness visit.

## 2021-07-01 ENCOUNTER — NON-PROVIDER VISIT (OUTPATIENT)
Dept: MEDICAL GROUP | Age: 71
End: 2021-07-01
Payer: MEDICARE

## 2021-07-01 DIAGNOSIS — Z23 NEED FOR VACCINATION: ICD-10-CM

## 2021-07-01 PROCEDURE — 90750 HZV VACC RECOMBINANT IM: CPT | Performed by: FAMILY MEDICINE

## 2021-07-01 PROCEDURE — 90471 IMMUNIZATION ADMIN: CPT | Performed by: FAMILY MEDICINE

## 2021-07-01 NOTE — PROGRESS NOTES
"Lauren Esquivel is a 70 y.o. female here for a non-provider visit for:   SHINGRIX (Shingles)    Reason for immunization: continue or complete series started at the office  Immunization records indicate need for vaccine: Yes, confirmed with Epic  Minimum interval has been met for this vaccine: Yes  ABN completed: Yes    VIS Dated  10/19/19  was given to patient: Yes  All IAC Questionnaire questions were answered \"No.\"    Patient tolerated injection and no adverse effects were observed or reported: Yes    Pt scheduled for next dose in series: No    "

## 2021-09-23 ENCOUNTER — NON-PROVIDER VISIT (OUTPATIENT)
Dept: MEDICAL GROUP | Age: 71
End: 2021-09-23
Payer: MEDICARE

## 2021-09-23 DIAGNOSIS — Z23 NEED FOR VACCINATION: ICD-10-CM

## 2021-09-23 PROCEDURE — 90662 IIV NO PRSV INCREASED AG IM: CPT | Performed by: FAMILY MEDICINE

## 2021-09-23 PROCEDURE — G0008 ADMIN INFLUENZA VIRUS VAC: HCPCS | Performed by: FAMILY MEDICINE

## 2021-11-02 NOTE — TELEPHONE ENCOUNTER
Phone Number Called: [phone] (home)       Call outcome: Spoke to patient regarding message below.    Message: pt said they all ready got it taken care of     Consent was obtained from the patient. The risks, benefits and alternatives to therapy were discussed in detail. Specifically, the risks of infection, scarring, bleeding, prolonged wound healing, nerve injury, incomplete removal, allergy to anesthesia and recurrence were addressed. Alternatives to ED&C, such as: surgical removal and XRT were also discussed.  Prior to the procedure, the treatment site was clearly identified and confirmed by the patient. All components of Universal Protocol/PAUSE Rule completed.

## 2021-12-09 ENCOUNTER — TELEPHONE (OUTPATIENT)
Dept: MEDICAL GROUP | Age: 71
End: 2021-12-09

## 2021-12-09 NOTE — TELEPHONE ENCOUNTER
1. Caller Name: Lauren Esquivel                          Call Back Number: [phone]      How would the patient prefer to be contacted with a response: Phone call OK to leave a detailed message    Patient is requesting Lab orders prior to OV.

## 2022-01-06 DIAGNOSIS — I10 ESSENTIAL HYPERTENSION: ICD-10-CM

## 2022-01-06 RX ORDER — TELMISARTAN 40 MG/1
TABLET ORAL
Qty: 90 TABLET | Refills: 3 | Status: SHIPPED | OUTPATIENT
Start: 2022-01-06 | End: 2022-12-21

## 2022-01-18 DIAGNOSIS — J45.30 MILD PERSISTENT ASTHMA WITHOUT COMPLICATION: ICD-10-CM

## 2022-01-20 ENCOUNTER — OFFICE VISIT (OUTPATIENT)
Dept: MEDICAL GROUP | Age: 72
End: 2022-01-20
Payer: MEDICARE

## 2022-01-20 VITALS
SYSTOLIC BLOOD PRESSURE: 112 MMHG | DIASTOLIC BLOOD PRESSURE: 72 MMHG | TEMPERATURE: 98.7 F | BODY MASS INDEX: 35.52 KG/M2 | HEART RATE: 72 BPM | WEIGHT: 221 LBS | OXYGEN SATURATION: 96 % | HEIGHT: 66 IN

## 2022-01-20 DIAGNOSIS — E78.00 PURE HYPERCHOLESTEROLEMIA: ICD-10-CM

## 2022-01-20 DIAGNOSIS — Z00.00 MEDICARE ANNUAL WELLNESS VISIT, SUBSEQUENT: Primary | ICD-10-CM

## 2022-01-20 DIAGNOSIS — J45.30 MILD PERSISTENT ASTHMA WITHOUT COMPLICATION: ICD-10-CM

## 2022-01-20 DIAGNOSIS — J96.11 CHRONIC RESPIRATORY FAILURE WITH HYPOXIA (HCC): ICD-10-CM

## 2022-01-20 DIAGNOSIS — E66.9 OBESITY (BMI 30-39.9): ICD-10-CM

## 2022-01-20 DIAGNOSIS — I10 PRIMARY HYPERTENSION: ICD-10-CM

## 2022-01-20 DIAGNOSIS — Z12.31 ENCOUNTER FOR SCREENING MAMMOGRAM FOR BREAST CANCER: ICD-10-CM

## 2022-01-20 DIAGNOSIS — G47.33 OSA ON CPAP: ICD-10-CM

## 2022-01-20 DIAGNOSIS — R73.03 PREDIABETES: ICD-10-CM

## 2022-01-20 PROCEDURE — G0439 PPPS, SUBSEQ VISIT: HCPCS | Performed by: FAMILY MEDICINE

## 2022-01-20 RX ORDER — ATORVASTATIN CALCIUM 20 MG/1
20 TABLET, FILM COATED ORAL
Qty: 90 TABLET | Refills: 3 | Status: SHIPPED | OUTPATIENT
Start: 2022-01-20 | End: 2023-01-09

## 2022-01-20 ASSESSMENT — ENCOUNTER SYMPTOMS: GENERAL WELL-BEING: EXCELLENT

## 2022-01-20 ASSESSMENT — ACTIVITIES OF DAILY LIVING (ADL): BATHING_REQUIRES_ASSISTANCE: 0

## 2022-01-20 ASSESSMENT — PATIENT HEALTH QUESTIONNAIRE - PHQ9: CLINICAL INTERPRETATION OF PHQ2 SCORE: 0

## 2022-01-20 ASSESSMENT — FIBROSIS 4 INDEX: FIB4 SCORE: 0.76

## 2022-01-20 NOTE — LETTER
[facility]  Yessy Paz M.D.  [address] [place] [address]  Fax: [phone]   Authorization for Release/Disclosure of   Protected Health Information   Name: SYDNI RAPHAEL : 1950 SSN: [id number]   Address: [address] [place] [address] [place] [address] Phone:    [phone] (home) [phone] (work)   I authorize the entity listed below to release/disclose the PHI below to:   [facility]/Yessy Paz M.D. and Yessy Paz M.D.   Provider or Entity Name:  [facility]   Address   City, State, [facility]   Phone:      Fax:  [phone]   Reason for request: continuity of care   Information to be released:    [  ] LAST COLONOSCOPY,  including any PATH REPORT and follow-up  [  ] LAST FIT/COLOGUARD RESULT [  ] LAST DEXA  [xxx  ] LAST MAMMOGRAM  [  ] LAST PAP  [  ] LAST LABS [  ] RETINA EXAM REPORT  [  ] IMMUNIZATION RECORDS  [ xxx ] Release all info      [  ] Check here and initial the line next to each item to release ALL health information INCLUDING  _____ Care and treatment for drug and / or alcohol abuse  _____ HIV testing, infection status, or AIDS  _____ Genetic Testing    DATES OF SERVICE OR TIME PERIOD TO BE DISCLOSED: _____________  I understand and acknowledge that:  * This Authorization may be revoked at any time by you in writing, except if your health information has already been used or disclosed.  * Your health information that will be used or disclosed as a result of you signing this authorization could be re-disclosed by the recipient. If this occurs, your re-disclosed health information may no longer be protected by State or Federal laws.  * You may refuse to sign this Authorization. Your refusal will not affect your ability to obtain treatment.  * This Authorization becomes effective upon signing and will  on (date) __________.      If no date is indicated, this Authorization will  one (1) year from the signature date.    Name: Sydni Raphael    Signature:   Date:      1/20/2022       PLEASE FAX REQUESTED RECORDS BACK TO: [phone]

## 2022-01-20 NOTE — PROGRESS NOTES
Chief Complaint   Patient presents with   • Medicare Annual Wellness       HPI:  Lauren is a 71 y.o. here for Medicare Annual Wellness Visit    Patient is doing well.  She is taking all medication as directed.  She tolerates them well, no side effects reported.    Patient went to [facility] for blood test.  However, due to the lack of equipment, she was not able to have blood test done prior to office visit.    Patient might have had mammogram done at [facility].  However, she is not sure.  Record is being requested.    Patient Active Problem List    Diagnosis Date Noted   • Chronic respiratory failure with hypoxia (HCC) 01/20/2022   • Heart murmur 10/29/2020   • Prediabetes 10/03/2017   • Obesity (BMI 30-39.9) 10/03/2017   • Mild persistent asthma without complication, Dr Borja 01/03/2017   • History of nephrolithiasis 08/21/2012   • Hyperlipidemia 08/28/2010   • HTN (hypertension) 08/11/2010   • SENTHIL on CPAP        Current Outpatient Medications   Medication Sig Dispense Refill   • ADVAIR DISKUS 250-50 MCG/DOSE AEROSOL POWDER, BREATH ACTIVATED INHALE 1 PUFF BY MOUTH 2 TIMES A DAY. 180 Each 3   • atorvastatin (LIPITOR) 20 MG Tab Take 1 Tablet by mouth at bedtime. 90 Tablet 3   • telmisartan (MICARDIS) 40 MG Tab TAKE 1 TABLET BY MOUTH EVERY DAY 90 Tablet 3   • metFORMIN ER (GLUCOPHAGE XR) 500 MG TABLET SR 24 HR Take 1 tablet by mouth 2 times a day. 180 tablet 3   • albuterol (PROVENTIL) 2.5mg/3ml Nebu Soln solution for nebulization 3 mL by Nebulization route every 6 hours as needed for Shortness of Breath. 75 mL 0   • albuterol 108 (90 Base) MCG/ACT Aero Soln inhalation aerosol Inhale 2 Puffs by mouth every 6 hours as needed for Shortness of Breath. 8.5 g 2     No current facility-administered medications for this visit.        Patient is taking medications as noted in medition list.  Current supplements as per medication list.     Allergies: Ace inhibitors and Ciprofloxacin    Current social contact/activities:  pt involved in [employer] and [employer] organizations, and neighborhood group, socializes with friends and family     Is patient current with immunizations? No, due for SHINGRIX (Shingles). Patient is interested in receiving NONE today.    She  reports that she has never smoked. She has never used smokeless tobacco. She reports that she does not drink alcohol and does not use drugs.  Counseling given: Not Answered      DPA/Advanced directive: Patient does not have an Advanced Directive.  A packet and workshop information was given on Advanced Directives.    ROS:    Gait: Uses no assistive device   Ostomy: No   Other tubes: No   Amputations: No   Chronic oxygen use Yes CPAP  Last eye exam 3 months ago   Wears hearing aids: No   : Denies any urinary leakage during the last 6 months    Screening:    Depression Screening  Little interest or pleasure in doing things?  0 - not at all  Feeling down, depressed, or hopeless? 0 - not at all    Screening for Cognitive Impairment  Three Minute Recall (captain, isamar, picture)  3/3    Draw clock face with all 12 numbers and set the hands to show 5 past 8.  Yes    If cognitive concerns identified, deferred for follow up unless specifically addressed in assessment and plan.    Fall Risk Assessment  Has the patient had two or more falls in the last year or any fall with injury in the last year?  No  If fall risk identified, deferred for follow up unless specifically addressed in assessment and plan.    Safety Assessment  Throw rugs on floor.  Yes  Handrails on all stairs.  Yes  Good lighting in all hallways.  Yes  Difficulty hearing.  No  Patient counseled about all safety risks that were identified.    Functional Assessment ADLs  Are there any barriers preventing you from cooking for yourself or meeting nutritional needs?  No.    Are there any barriers preventing you from driving safely or obtaining transportation?  No.    Are there any barriers preventing you from using a telephone  or calling for help?  No.    Are there any barriers preventing you from shopping?  No.    Are there any barriers preventing you from taking care of your own finances?  No.    Are there any barriers preventing you from managing your medications?    No.    Are there any barriers preventing you from showering, bathing or dressing yourself?  No.    Are you currently engaging in any exercise or physical activity?  No.     What is your perception of your health?  Excellent.    Health Maintenance Summary          Overdue - MAMMOGRAM (Yearly) Overdue since 1/6/2021 01/06/2020  MA-SCREENING MAMMO BILAT W/TOMOSYNTHESIS W/CAD    11/13/2018  MA-SCREENING MAMMO BILAT W/TOMOSYNTHESIS W/CAD    08/25/2017  MA-MAMMO SCREENING BILAT W/JESSIE W/CAD    08/03/2016  MA-DIAGNOSTIC MAMMO W/CAD-BILAT    07/24/2015  MA-UNILAT DIAGNOSTIC MAMMO W/ CAD LEFT    Only the first 5 history entries have been loaded, but more history exists.          Overdue - IMM ZOSTER VACCINES (2 of 2) Overdue since 8/26/2021 07/01/2021  Imm Admin: Zoster Vaccine Recombinant (RZV) (SHINGRIX)          Annual Wellness Visit (Every 366 Days) Next due on 1/21/2023 01/20/2022  Visit Dx: Medicare annual wellness visit, subsequent    01/20/2022  Level of Service: ANNUAL WELLNESS VISIT-INCLUDES PPPS SUBSEQUE*    10/29/2019  Visit Dx: Medicare annual wellness visit, subsequent    10/29/2019  Subsequent Annual Wellness Visit - Includes PPPS ()    09/18/2018  Visit Dx: Medicare annual wellness visit, subsequent    Only the first 5 history entries have been loaded, but more history exists.          COLORECTAL CANCER SCREENING (COLONOSCOPY - Preferred) Next due on 9/16/2024 09/16/2014  AMB REFERRAL TO GI FOR COLONOSCOPY    06/28/2007  AMB REFERRAL TO GI FOR COLONOSCOPY          BONE DENSITY (Every 5 Years) Next due on 1/19/2026 01/19/2021  DS-BONE DENSITY STUDY (DEXA)    02/05/2015  DS-BONE DENSITY STUDY (DEXA)          IMM DTaP/Tdap/Td Vaccine (2 - Td or  Tdap) Next due on 10/29/2029    10/29/2019  Imm Admin: Tdap Vaccine    08/09/2008  Imm Admin: TD Vaccine          IMM PNEUMOCOCCAL VACCINE: 65+ Years (Series Information) Completed    09/18/2018  Imm Admin: Pneumococcal polysaccharide vaccine (PPSV-23)    01/22/2016  Imm Admin: Pneumococcal Conjugate Vaccine (Prevnar/PCV-13)          IMM INFLUENZA (Series Information) Completed    09/23/2021  Imm Admin: Influenza Vaccine Adult HD    10/29/2020  Imm Admin: Influenza Vaccine Adult HD    10/29/2019  Imm Admin: Influenza Vaccine Adult HD    09/18/2018  Imm Admin: Influenza Vaccine Adult HD    10/03/2017  Imm Admin: Influenza Vaccine Adult HD    Only the first 5 history entries have been loaded, but more history exists.          COVID-19 Vaccine (Series Information) Completed    01/10/2022  Imm Admin: Moderna SARS-CoV-2 Vaccine    03/15/2021  Imm Admin: Moderna SARS-CoV-2 Vaccine    02/15/2021  Imm Admin: Moderna SARS-CoV-2 Vaccine          IMM HEP B VACCINE (Series Information) Aged Out    No completion history exists for this topic.          IMM MENINGOCOCCAL VACCINE (MCV4) (Series Information) Aged Out    No completion history exists for this topic.          Discontinued - HEPATITIS C SCREENING  Discontinued    No completion history exists for this topic.                Patient Care Team:  Yessy Paz M.D. as PCP - General (Family Medicine)    Social History     Tobacco Use   • Smoking status: Never Smoker   • Smokeless tobacco: Never Used   Vaping Use   • Vaping Use: Never used   Substance Use Topics   • Alcohol use: No     Alcohol/week: 0.0 oz   • Drug use: No     Family History   Problem Relation Age of Onset   • Lung Disease Mother    • Hypertension Mother    • Heart Disease Father    • Hypertension Maternal Grandfather    • Stroke Brother    • Heart Disease Brother      She  has a past medical history of Anemia, Anxiety state, unspecified (1/3/2010), ASTHMA, Chronic rhinitis (1/3/2010), COPD, Elevated C-reactive  "protein (CRP) (2/6/2012), H/O pyelonephritis (8/21/2012), H/O: female infertility (8/10/2011), History of nephrolithiasis (8/21/2012), HTN, HTN (hypertension) (8/11/2010), Hyperglycemia (6/4/2012), Hyperlipidemia (8/28/2010), Influenza A with pneumonia (12/27/2016), Kidney filling defect (8/21/2012), Laryngeal spasm (1/3/2010), Metabolic syndrome (6/4/2012), Mycoplasma pneumonia (2003), Obesity (6/4/2012), Preventative health care (8/11/2010), Pyelonephritis (7/30/2012), Respiratory malfunction arising from mental factors (1/3/2010), Sleep apnea, Vitamin d deficiency (8/28/2010), and Wheeze (12/4/2009). She also has no past medical history of Encounter for long-term (current) use of other medications.   Past Surgical History:   Procedure Laterality Date   • LAPAROSCOPY      for infertility   • TONSILLECTOMY AND ADENOIDECTOMY           Exam:   /72 (BP Location: Right arm, Patient Position: Sitting, BP Cuff Size: Adult long)   Pulse 72   Temp 37.1 °C (98.7 °F) (Temporal)   Ht 1.676 m (5' 6\")   Wt 100 kg (221 lb)   SpO2 96%  Body mass index is 35.67 kg/m².    Hearing excellent.    Dentition good  Alert, oriented in no acute distress.  Eye contact is good, speech goal directed, affect calm    Assessment and Plan. The following treatment and monitoring plan is recommended:      1. Pure hypercholesterolemia  Chronic, was ton Lipitor but stopped for about 4 weeks as she was not able to get her medications at local pharmacy.   - restart atorvastatin (LIPITOR) 20 MG Tab; Take 1 Tablet by mouth at bedtime.  Dispense: 90 Tablet; Refill: 3  - CBC WITH DIFFERENTIAL; Future  - Comp Metabolic Panel; Future  - Lipid Profile; Future    2. Primary hypertension  Chronic, controlled with Telmisartan 40 mg qd, no s/e reported, will continue.    - MICROALBUMIN CREAT RATIO URINE; Future    3. Prediabetes  - Dietary/lifestyle modification and weight loss    - HEMOGLOBIN A1C; Future    4. SENTHIL on CPAP  5. Chronic respiratory " failure with hypoxia (HCC)  - Pt was counseled on risks of untreated SENTHIL including: increased risk of cardiovascular disease including coronary artery disease, systemic arterial hypertension, pulmonary arterial hypertension, cardiac arrythmias, stroke, increased risk of MVA due to drowsiness, type 2 diabetes, chronic kidney disease, and non-alcoholic liver disease.   - recommended weight loss and consistent use of CPAP  - f/u with sleep medicine as directed.      6. Obesity (BMI 30-39.9)  - Patient identified as having weight management issue.  Appropriate orders and counseling given.    7. Mild persistent asthma without complication, Dr Huong Mendoza, controlled with Advair and Albuterol PRN mg daily, no s/e reported, will cont.     8. Encounter for screening mammogram for breast cancer  Patient might have had mammogram done with [facility], will request records.     9. Medicare annual wellness visit, subsequent  General health and wellness counseling provided.      Patient will get shingle vaccine at local pharmacy.       Services suggested: No services needed at this time  Health Care Screening recommendations as per orders if indicated.  Referrals offered: PT/OT/Nutrition counseling/Behavioral Health/Smoking cessation as per orders if indicated.    Discussion today about general wellness and lifestyle habits:    · Prevent falls and reduce trip hazards; Cautioned about securing or removing rugs.  · Have a working fire alarm and carbon monoxide detector;   · Engage in regular physical activity and social activities.     Follow-up: Return in about 6 months (around 7/20/2022) for Annual wellness visit.

## 2022-01-24 RX ORDER — BUDESONIDE AND FORMOTEROL FUMARATE DIHYDRATE 160; 4.5 UG/1; UG/1
2 AEROSOL RESPIRATORY (INHALATION) 2 TIMES DAILY
Qty: 10.2 G | Refills: 11 | Status: SHIPPED
Start: 2022-01-24 | End: 2023-01-26

## 2022-04-19 NOTE — PROGRESS NOTES
"Lauren Esquivel is a 71 y.o. female here for a non-provider visit for:   FLU    Reason for immunization: Annual Flu Vaccine  Immunization records indicate need for vaccine: Yes, confirmed with Epic  Minimum interval has been met for this vaccine: Yes  ABN completed: Not Indicated    VIS Dated   was given to patient: Yes  All IAC Questionnaire questions were answered \"No.\"    Patient tolerated injection and no adverse effects were observed or reported: Yes    Pt scheduled for next dose in series: No    " 70 y/o M with PMH HTN, hypothyroidism. 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 Intubated for airway protection at OSH, extubated 4/13. Called to consult for increased chest congestion. CTA chest with atelectasis and small RUL segmental branch PE.

## 2022-04-22 ENCOUNTER — HOSPITAL ENCOUNTER (OUTPATIENT)
Dept: RADIOLOGY | Facility: MEDICAL CENTER | Age: 72
End: 2022-04-22
Attending: FAMILY MEDICINE
Payer: MEDICARE

## 2022-04-22 DIAGNOSIS — Z12.31 VISIT FOR SCREENING MAMMOGRAM: ICD-10-CM

## 2022-04-22 PROCEDURE — 77063 BREAST TOMOSYNTHESIS BI: CPT

## 2022-04-25 DIAGNOSIS — R92.8 ABNORMAL MAMMOGRAM OF RIGHT BREAST: ICD-10-CM

## 2022-05-05 ENCOUNTER — HOSPITAL ENCOUNTER (OUTPATIENT)
Dept: RADIOLOGY | Facility: MEDICAL CENTER | Age: 72
End: 2022-05-05
Attending: FAMILY MEDICINE
Payer: MEDICARE

## 2022-05-05 DIAGNOSIS — R92.8 ABNORMAL MAMMOGRAM OF RIGHT BREAST: ICD-10-CM

## 2022-05-05 PROCEDURE — 77065 DX MAMMO INCL CAD UNI: CPT | Mod: RT

## 2022-05-06 DIAGNOSIS — R92.0 MICROCALCIFICATION OF RIGHT BREAST ON MAMMOGRAM: ICD-10-CM

## 2022-05-09 ENCOUNTER — TELEPHONE (OUTPATIENT)
Dept: RADIOLOGY | Facility: MEDICAL CENTER | Age: 72
End: 2022-05-09
Payer: MEDICARE

## 2022-05-25 ENCOUNTER — HOSPITAL ENCOUNTER (OUTPATIENT)
Dept: RADIOLOGY | Facility: MEDICAL CENTER | Age: 72
End: 2022-05-25
Attending: FAMILY MEDICINE
Payer: MEDICARE

## 2022-05-25 DIAGNOSIS — R92.0 MICROCALCIFICATION OF RIGHT BREAST ON MAMMOGRAM: ICD-10-CM

## 2022-05-25 LAB — PATHOLOGY CONSULT NOTE: NORMAL

## 2022-05-25 PROCEDURE — 77065 DX MAMMO INCL CAD UNI: CPT | Mod: RT

## 2022-05-25 PROCEDURE — 88305 TISSUE EXAM BY PATHOLOGIST: CPT

## 2022-05-26 DIAGNOSIS — R92.0 MICROCALCIFICATION OF RIGHT BREAST ON MAMMOGRAM: ICD-10-CM

## 2022-07-19 ENCOUNTER — OFFICE VISIT (OUTPATIENT)
Dept: MEDICAL GROUP | Age: 72
End: 2022-07-19
Payer: MEDICARE

## 2022-07-19 VITALS
SYSTOLIC BLOOD PRESSURE: 116 MMHG | OXYGEN SATURATION: 96 % | BODY MASS INDEX: 35.68 KG/M2 | WEIGHT: 222 LBS | HEART RATE: 65 BPM | TEMPERATURE: 97.7 F | DIASTOLIC BLOOD PRESSURE: 70 MMHG | HEIGHT: 66 IN

## 2022-07-19 DIAGNOSIS — I10 PRIMARY HYPERTENSION: ICD-10-CM

## 2022-07-19 DIAGNOSIS — J45.30 MILD PERSISTENT ASTHMA WITHOUT COMPLICATION: ICD-10-CM

## 2022-07-19 DIAGNOSIS — E66.9 OBESITY (BMI 30-39.9): ICD-10-CM

## 2022-07-19 DIAGNOSIS — R73.03 PREDIABETES: ICD-10-CM

## 2022-07-19 DIAGNOSIS — E78.00 PURE HYPERCHOLESTEROLEMIA: ICD-10-CM

## 2022-07-19 DIAGNOSIS — G47.33 OSA ON CPAP: ICD-10-CM

## 2022-07-19 PROBLEM — J96.11 CHRONIC RESPIRATORY FAILURE WITH HYPOXIA (HCC): Status: RESOLVED | Noted: 2022-01-20 | Resolved: 2022-07-19

## 2022-07-19 PROCEDURE — 99214 OFFICE O/P EST MOD 30 MIN: CPT | Performed by: FAMILY MEDICINE

## 2022-07-19 RX ORDER — METFORMIN HYDROCHLORIDE 500 MG/1
500 TABLET, EXTENDED RELEASE ORAL 2 TIMES DAILY
Qty: 180 TABLET | Refills: 3 | Status: SHIPPED | OUTPATIENT
Start: 2022-07-19 | End: 2023-07-25

## 2022-07-19 ASSESSMENT — FIBROSIS 4 INDEX: FIB4 SCORE: 0.76

## 2022-07-19 NOTE — PROGRESS NOTES
Subjective:   CC: Hypertension follow-up    HPI:     Lauren Esquivel is a 71 y.o. female, established patient of the clinic.     Patient has chronic essential hypertension, hyperlipidemia, sleep apnea, obesity, mild persistent asthma, prediabetes.  Patient takes all medication as directed.  She tolerates them well, no side effects reported.  Her asthma is controlled.  She rarely requires oral steroid for asthma exacerbation.    Patient is caring for her  who has a lot of medical illnesses.  Patient endorses increasing social stress because of this.  However, she is coping well and declined treatment or intervention.  She received excellent support from friends and family members.    Patient is due for annual labs.  Patient will have labs done soon in a few weeks.    Current medicines (including changes today)  Current Outpatient Medications   Medication Sig Dispense Refill   • metFORMIN ER (GLUCOPHAGE XR) 500 MG TABLET SR 24 HR Take 1 Tablet by mouth 2 times a day. 180 Tablet 3   • budesonide-formoterol (SYMBICORT) 160-4.5 MCG/ACT Aerosol Inhale 2 Puffs 2 times a day. 10.2 g 11   • atorvastatin (LIPITOR) 20 MG Tab Take 1 Tablet by mouth at bedtime. 90 Tablet 3   • telmisartan (MICARDIS) 40 MG Tab TAKE 1 TABLET BY MOUTH EVERY DAY 90 Tablet 3   • albuterol (PROVENTIL) 2.5mg/3ml Nebu Soln solution for nebulization 3 mL by Nebulization route every 6 hours as needed for Shortness of Breath. 75 mL 0   • albuterol 108 (90 Base) MCG/ACT Aero Soln inhalation aerosol Inhale 2 Puffs by mouth every 6 hours as needed for Shortness of Breath. 8.5 g 2     No current facility-administered medications for this visit.     She  has a past medical history of Anemia, Anxiety state, unspecified (1/3/2010), ASTHMA, Chronic rhinitis (1/3/2010), COPD, Elevated C-reactive protein (CRP) (2/6/2012), H/O pyelonephritis (8/21/2012), H/O: female infertility (8/10/2011), History of nephrolithiasis (8/21/2012), HTN, HTN (hypertension)  "(8/11/2010), Hyperglycemia (6/4/2012), Hyperlipidemia (8/28/2010), Influenza A with pneumonia (12/27/2016), Kidney filling defect (8/21/2012), Laryngeal spasm (1/3/2010), Metabolic syndrome (6/4/2012), Mycoplasma pneumonia (2003), Obesity (6/4/2012), Preventative health care (8/11/2010), Pyelonephritis (7/30/2012), Respiratory malfunction arising from mental factors (1/3/2010), Sleep apnea, Vitamin d deficiency (8/28/2010), and Wheeze (12/4/2009).    She has no past medical history of Encounter for long-term (current) use of other medications.    I reviewed patient's problem list, allergies, medications, family hx, social hx with patient and update EPIC.        Objective:     /70 (BP Location: Right arm, Patient Position: Sitting, BP Cuff Size: Large adult)   Pulse 65   Temp 36.5 °C (97.7 °F) (Temporal)   Ht 1.676 m (5' 6\")   Wt 101 kg (222 lb)   SpO2 96%  Body mass index is 35.83 kg/m².    Physical Exam:  Constitutional: awake, alert, in no distress.  Skin: Warm, dry, good turgor, no rashes, bruises, ulcers in visible areas.  Eye: conjunctiva clear, lids neg for edema or lesions.  ENMT: TM and auditory canals wnl.    Neck: Trachea midline, no masses, no thyromegaly. No cervical or supraclavicular lymphadenopathy  Respiratory: Unlabored respiratory effort, lungs clear to auscultation, no wheezes, no rales.  Cardiovascular: Normal S1, S2, no murmur, no pedal edema.  Abdomen: Soft, non-tender to palpation, active BS, no hernia, no hepatosplenomegaly, negative rebound or guarding.   Psych: Oriented x3, affect and mood wnl, intact judgement and insight.       Assessment and Plan:   The following treatment plan was discussed    1. Prediabetes  Chronic, currently taking metformin  mg twice daily.  Patient is due to have repeat labs for reassessment.  Her weight is stable.  She is active, but does not exercise regularly.  - metFORMIN ER (GLUCOPHAGE XR) 500 MG TABLET SR 24 HR; Take 1 Tablet by mouth 2 times " a day.  Dispense: 180 Tablet; Refill: 3  - dietary modification, exercise, and weight loss.   - avoid alcohol, drugs, tobacco products     2. Mild persistent asthma without complication, Dr Huong Mendoza, controlled with Symbicort and Advair as needed.  Rarely had exacerbation that required oral steroid.  Will continue to monitor.    3. Obesity (BMI 30-39.9)  - Patient identified as having weight management issue.  Appropriate orders and counseling given.     4. SENTHIL on CPAP  -Continue CPAP and follow-up with sleep medicine as directed.  Patient does not require oxygen supplement with CPAP.    5. Pure hypercholesterolemia  Chronic, currently taking Lipitor 20 mg daily. Patient is due to have repeat labs for reassessment.  However, she forgot to have labs done before office visit today.  -Continue Lipitor 10 mg daily.     6. Primary hypertension  Chronic, controlled with telmisartan 40 mg daily, no s/e reported, will continue.            Yessy Paz M.D.      Followup: Return in about 6 months (around 1/19/2023) for Multiple issues.    Please note that this dictation was created using voice recognition software. I have made every reasonable attempt to correct obvious errors, but I expect that there are errors of grammar and possibly content that I did not discover before finalizing the note.

## 2022-07-25 ENCOUNTER — HOSPITAL ENCOUNTER (OUTPATIENT)
Dept: LAB | Facility: MEDICAL CENTER | Age: 72
End: 2022-07-25
Attending: FAMILY MEDICINE
Payer: MEDICARE

## 2022-07-25 DIAGNOSIS — E78.00 PURE HYPERCHOLESTEROLEMIA: ICD-10-CM

## 2022-07-25 DIAGNOSIS — R73.03 PREDIABETES: ICD-10-CM

## 2022-07-25 DIAGNOSIS — I10 PRIMARY HYPERTENSION: ICD-10-CM

## 2022-07-25 LAB
ALBUMIN SERPL BCP-MCNC: 4.7 G/DL (ref 3.2–4.9)
ALBUMIN/GLOB SERPL: 1.8 G/DL
ALP SERPL-CCNC: 49 U/L (ref 30–99)
ALT SERPL-CCNC: 31 U/L (ref 2–50)
ANION GAP SERPL CALC-SCNC: 12 MMOL/L (ref 7–16)
AST SERPL-CCNC: 24 U/L (ref 12–45)
BASOPHILS # BLD AUTO: 1.3 % (ref 0–1.8)
BASOPHILS # BLD: 0.09 K/UL (ref 0–0.12)
BILIRUB SERPL-MCNC: 0.5 MG/DL (ref 0.1–1.5)
BUN SERPL-MCNC: 17 MG/DL (ref 8–22)
CALCIUM SERPL-MCNC: 9.3 MG/DL (ref 8.5–10.5)
CHLORIDE SERPL-SCNC: 104 MMOL/L (ref 96–112)
CHOLEST SERPL-MCNC: 165 MG/DL (ref 100–199)
CO2 SERPL-SCNC: 24 MMOL/L (ref 20–33)
CREAT SERPL-MCNC: 0.61 MG/DL (ref 0.5–1.4)
CREAT UR-MCNC: 93.35 MG/DL
EOSINOPHIL # BLD AUTO: 0.43 K/UL (ref 0–0.51)
EOSINOPHIL NFR BLD: 6.2 % (ref 0–6.9)
ERYTHROCYTE [DISTWIDTH] IN BLOOD BY AUTOMATED COUNT: 42.7 FL (ref 35.9–50)
EST. AVERAGE GLUCOSE BLD GHB EST-MCNC: 123 MG/DL
FASTING STATUS PATIENT QL REPORTED: NORMAL
GFR SERPLBLD CREATININE-BSD FMLA CKD-EPI: 94 ML/MIN/1.73 M 2
GLOBULIN SER CALC-MCNC: 2.6 G/DL (ref 1.9–3.5)
GLUCOSE SERPL-MCNC: 96 MG/DL (ref 65–99)
HBA1C MFR BLD: 5.9 % (ref 4–5.6)
HCT VFR BLD AUTO: 41.8 % (ref 37–47)
HDLC SERPL-MCNC: 64 MG/DL
HGB BLD-MCNC: 13.7 G/DL (ref 12–16)
IMM GRANULOCYTES # BLD AUTO: 0.01 K/UL (ref 0–0.11)
IMM GRANULOCYTES NFR BLD AUTO: 0.1 % (ref 0–0.9)
LDLC SERPL CALC-MCNC: 83 MG/DL
LYMPHOCYTES # BLD AUTO: 1.75 K/UL (ref 1–4.8)
LYMPHOCYTES NFR BLD: 25.2 % (ref 22–41)
MCH RBC QN AUTO: 28.7 PG (ref 27–33)
MCHC RBC AUTO-ENTMCNC: 32.8 G/DL (ref 33.6–35)
MCV RBC AUTO: 87.6 FL (ref 81.4–97.8)
MICROALBUMIN UR-MCNC: <1.2 MG/DL
MICROALBUMIN/CREAT UR: NORMAL MG/G (ref 0–30)
MONOCYTES # BLD AUTO: 0.61 K/UL (ref 0–0.85)
MONOCYTES NFR BLD AUTO: 8.8 % (ref 0–13.4)
NEUTROPHILS # BLD AUTO: 4.05 K/UL (ref 2–7.15)
NEUTROPHILS NFR BLD: 58.4 % (ref 44–72)
NRBC # BLD AUTO: 0 K/UL
NRBC BLD-RTO: 0 /100 WBC
PLATELET # BLD AUTO: 307 K/UL (ref 164–446)
PMV BLD AUTO: 10.7 FL (ref 9–12.9)
POTASSIUM SERPL-SCNC: 4.1 MMOL/L (ref 3.6–5.5)
PROT SERPL-MCNC: 7.3 G/DL (ref 6–8.2)
RBC # BLD AUTO: 4.77 M/UL (ref 4.2–5.4)
SODIUM SERPL-SCNC: 140 MMOL/L (ref 135–145)
TRIGL SERPL-MCNC: 89 MG/DL (ref 0–149)
WBC # BLD AUTO: 6.9 K/UL (ref 4.8–10.8)

## 2022-07-25 PROCEDURE — 83036 HEMOGLOBIN GLYCOSYLATED A1C: CPT | Mod: GA

## 2022-07-25 PROCEDURE — 36415 COLL VENOUS BLD VENIPUNCTURE: CPT

## 2022-07-25 PROCEDURE — 82570 ASSAY OF URINE CREATININE: CPT

## 2022-07-25 PROCEDURE — 82043 UR ALBUMIN QUANTITATIVE: CPT

## 2022-07-25 PROCEDURE — 80061 LIPID PANEL: CPT

## 2022-07-25 PROCEDURE — 85025 COMPLETE CBC W/AUTO DIFF WBC: CPT

## 2022-07-25 PROCEDURE — 80053 COMPREHEN METABOLIC PANEL: CPT

## 2022-07-26 DIAGNOSIS — R73.03 PREDIABETES: ICD-10-CM

## 2022-09-30 NOTE — TELEPHONE ENCOUNTER
Interventional Radiology Progress Note    Patient: Shelly Vela Date: 2022  : 1968 Attending: NADEGE Yanes MD   54 year old female   Diagnosis/Comorbidities/Complications:  Patient Active Problem List   Diagnosis   • BRCA2 positive   • Family history of breast cancer   • Malignant neoplasm of overlapping sites of breast in female, estrogen receptor positive (CMS/HCC)   • Malignant neoplasm of overlapping sites of left breast in female, estrogen receptor positive (CMS/HCC)       History:  53 yo female with a h/o breast cancer and post op fluid collection here for follow up of her left breast drain.     Reason for Follow -up :  S/p IR drain exchange (Rosie) 22    Subjective:   Shelly reports outputs have been decreasing since her last visit. Reports 15 ml output over 24 hours, then yesterday the only output she had was the amount she flushed in. She has some soreness on her skin from the adhesive. Reports the puffy sensation to her chest has improved.     Physical Exam:  GENERAL: A&Ox3, well-appearing, in NAD.  HEENT: NC/AT.   RESPIRATORY: No signs of respiratory distress or accessory muscle use  CHEST: Chest is symmetric, without chest wall deformities. IBIS drain to left chest wall with serous output.   INTEGUMENTARY: No abnormal rashes, scars, or lesions.  NEUROLOGIC: No sensory or motor deficits, normal gait, cranial nerves grossly intact.   PSYCHIATRIC: Coherent speech. Asks appropriate questions. Verbalizes understanding of our discussions today.       Vital Last Value 24 Hour Range   Temperature   No data recorded   Pulse   No data recorded   Respiratory   No data recorded   Non-Invasive  Blood Pressure   No data recorded   Arterial  Blood Pressure   No data recorded   Pulse Oximetry   No data recorded     Laboratory Results:  No results for input(s): SODIUM, POTASSIUM, BUN, CREATININE, WBC, HCT, HGB, PLT, INR, FBGN, CPK, MMB, RAPDTR, BNP, GLUCOSE, TSH, MG, BILIRUBIN in  Refill approved, Rx sent electronically to pharmacy.   Yessy Paz M.D.      the last 72 hours.    Invalid input(s): GFR  Above labs were reviewed.    Scheduled Medications:  Current Outpatient Medications   Medication Sig Dispense Refill   • sodium chloride flush 0.9 % injectable solution Flush drain with one-half contents of syringe (5 mL) 3 times daily for 1 week. Use new syringe each time, discard remaining contents. 210 mL 1   • gabapentin (NEURONTIN) 300 MG capsule Take 1 capsule by mouth in the morning and 1 capsule in the evening. 60 capsule 3   • traMADol (ULTRAM) 50 MG tablet Take 1 tablet by mouth every 8 hours as needed for Pain. 10 tablet 0   • loperamide (IMODIUM) 2 MG capsule Take 4 mg by mouth at first loose stool, then 2 mg every 2 hours until diarrhea-free for 12 hours. 30 capsule 6   • dexAMETHasone (DECADRON) 4 MG tablet Take 2 tablets by mouth 2 times daily. Take for 3 days with each chemotherapy cycle.  Start the day before each docetaxel treatment. 72 tablet 0   • prochlorperazine (COMPAZINE) 10 MG tablet Take 1 tablet by mouth every 6 hours as needed for Nausea or Vomiting. 30 tablet 5   • predniSONE (DELTASONE) 50 MG tablet Take 1 tablet 13 hours, 7 hours, and 1 hour prior to MRI 3 tablet 0   • acetaminophen (TYLENOL) 325 MG tablet Take 650 mg by mouth every 4 hours as needed for Pain.     • ibuprofen (MOTRIN) 200 MG tablet Take 200 mg by mouth every 6 hours as needed for Pain.     • oxyCODONE, IMM REL, (ROXICODONE) 5 MG immediate release tablet Take 1 tablet by mouth every 6 hours as needed for Pain. 10 tablet 0   • sennosides-docusate sodium (SENOKOT-S) 8.6-50 MG tablet Take 1-2 tablets by mouth daily as needed for Constipation. 20 tablet 1   • ondansetron (Zofran) 4 MG tablet Take 1 tablet by mouth every 6 hours as needed for Nausea. 20 tablet 0   • Fremanezumab-vfrm 225 MG/1.5ML Solution Auto-injector Inject 225 mg into the skin every 28 days. 1.68 mL 5   • cyclobenzaprine (FLEXERIL) 10 MG tablet Take 1 tablet by mouth nightly as needed for Muscle spasms. 90  tablet 5   • venlafaxine XR (EFFEXOR XR) 150 MG 24 hr capsule Take 1 capsule by mouth daily. 90 capsule 5   • omeprazole 20 MG tablet Take 1 tablet by mouth daily. 30 tablet 3   • sumatriptan (IMITREX) 100 MG tablet Take 1 tablet by mouth as needed for Migraine. Take 1 tablet by mouth at onset of migraine. May repeat after 2 hours if needed. 9 tablet 11   • triamcinolone (ARISTOCORT) 0.1 % ointment Apply thin layer to affected areas on palms bid prn rash/itch. Don't use on normal skin. Don't use on face.  If hands are severely inflamed, use glove occlusion. 80 g 1   • Emollient ([place] Smooth Extra Care 20) Cream Apply 1 application topically 2 times daily. 1 each 3   • polyethylene glycol (MIRALAX) 17 g packet Take 17 g by mouth daily. Stir and dissolve powder in any 4 to 8 ounces of beverage, then drink.     • Misc Natural Products (FIBER 7 PO) 1 teaspoon daily     • Ferrous Sulfate (Iron) 325 (65 Fe) MG Tab Take 1 tablet by mouth daily.     • fluticasone (FLONASE) 50 MCG/ACT nasal spray Spray 1 spray in each nostril daily as needed.     • Ketotifen Fumarate (ALAWAY OP) Apply to eye as needed.     • Cetirizine HCl (ZYRTEC ALLERGY PO) Take 1 tablet by mouth daily.       Current Facility-Administered Medications   Medication Dose Route Frequency Provider Last Rate Last Admin   • povidone-iodine (BETADINE) 5 % ophthalmic solution 1 drop  1 drop Both Eyes Once Agnieszka Issa PA-C           Continuous Infusions:  Current Facility-Administered Medications   Medication Dose Route Frequency Provider Last Rate Last Admin       Intake/Output:      Imaging:   IR Follow Up Visit    Result Date: 9/30/2022  Please see note in Epic for full dictation.      Plan/Recommendations:  S/p IR drain exchange (Rosie) 9/19/22     Discussed with Dr. Velez, recommends sclerosing drain.  10 cc betadine injected into drain, will dwell for one hour and then drain into bulb.  Offered for patient to return for draining, they have  additional appointments after this. They feel comfortable with draining, otherwise will return to radiology with any questions.  All questions answered, in the mean time we will touch base Monday to assess outputs.     Agnieszka Issa PA-C  Interventional Radiology  Available via Epic Secure Chat ([phone])    Please page the on-call IR provider for urgent needs outside of the above hours.    Approximately 20 minutes was spent on this encounter, this includes chart review, documentation, multidisciplinary care coordination, and face-to face time spent with patient.

## 2022-11-09 ENCOUNTER — PATIENT MESSAGE (OUTPATIENT)
Dept: HEALTH INFORMATION MANAGEMENT | Facility: OTHER | Age: 72
End: 2022-11-09

## 2022-11-30 ENCOUNTER — HOSPITAL ENCOUNTER (OUTPATIENT)
Dept: RADIOLOGY | Facility: MEDICAL CENTER | Age: 72
End: 2022-11-30
Attending: FAMILY MEDICINE
Payer: MEDICARE

## 2022-11-30 DIAGNOSIS — R92.0 MICROCALCIFICATION OF RIGHT BREAST ON MAMMOGRAM: ICD-10-CM

## 2022-11-30 PROCEDURE — 77065 DX MAMMO INCL CAD UNI: CPT | Mod: RT

## 2022-12-19 ENCOUNTER — TELEPHONE (OUTPATIENT)
Dept: MEDICAL GROUP | Age: 72
End: 2022-12-19
Payer: MEDICARE

## 2022-12-19 DIAGNOSIS — G47.33 OSA ON CPAP: ICD-10-CM

## 2022-12-19 NOTE — TELEPHONE ENCOUNTER
VOICEMAIL  1. Caller Name: Lauren Esquivel  Call Back Number: [phone]       2. Message: pt's former pulmonary doctor has retired so she needs a referral to a new one. She also needs a sleep apnea test and new cpap machine because hers is 9 years old.    3. Patient approves office to leave a detailed voicemail/MyChart message: no

## 2022-12-19 NOTE — TELEPHONE ENCOUNTER
Phone Number Called: [phone] (home) [phone] (work)      Call outcome: Left detailed message for patient. Informed to call back with any additional questions.    Message: as pt requested, I lvm with status of her request regarding sleep apnea test, order for cpap machine, and referral to pulmonary. I let her know I sent this to Dr Paz.

## 2022-12-21 DIAGNOSIS — I10 ESSENTIAL HYPERTENSION: ICD-10-CM

## 2022-12-21 RX ORDER — TELMISARTAN 40 MG/1
TABLET ORAL
Qty: 90 TABLET | Refills: 3 | Status: SHIPPED | OUTPATIENT
Start: 2022-12-21 | End: 2023-12-20

## 2022-12-21 NOTE — TELEPHONE ENCOUNTER
Received request via: Pharmacy    Was the patient seen in the last year in this department? Yes    Does the patient have an active prescription (recently filled or refills available) for medication(s) requested? No    Does the patient have assisted Plus and need 100 day supply (blood pressure, diabetes and cholesterol meds only)? Patient does not have SCP

## 2023-01-07 DIAGNOSIS — E78.00 PURE HYPERCHOLESTEROLEMIA: ICD-10-CM

## 2023-01-09 RX ORDER — ATORVASTATIN CALCIUM 20 MG/1
TABLET, FILM COATED ORAL
Qty: 90 TABLET | Refills: 3 | Status: SHIPPED | OUTPATIENT
Start: 2023-01-09 | End: 2023-07-08

## 2023-01-25 ENCOUNTER — TELEPHONE (OUTPATIENT)
Dept: MEDICAL GROUP | Age: 73
End: 2023-01-25
Payer: MEDICARE

## 2023-01-26 ENCOUNTER — APPOINTMENT (OUTPATIENT)
Dept: MEDICAL GROUP | Age: 73
End: 2023-01-26
Payer: MEDICARE

## 2023-01-26 DIAGNOSIS — J45.30 MILD PERSISTENT ASTHMA WITHOUT COMPLICATION: ICD-10-CM

## 2023-01-26 RX ORDER — BUDESONIDE AND FORMOTEROL FUMARATE DIHYDRATE 160; 4.5 UG/1; UG/1
AEROSOL RESPIRATORY (INHALATION)
Qty: 10.2 EACH | Refills: 11 | Status: SHIPPED | OUTPATIENT
Start: 2023-01-26

## 2023-03-06 ENCOUNTER — PATIENT MESSAGE (OUTPATIENT)
Dept: MEDICAL GROUP | Age: 73
End: 2023-03-06
Payer: MEDICARE

## 2023-03-10 ENCOUNTER — OFFICE VISIT (OUTPATIENT)
Dept: MEDICAL GROUP | Age: 73
End: 2023-03-10
Payer: MEDICARE

## 2023-03-10 VITALS
OXYGEN SATURATION: 96 % | BODY MASS INDEX: 35.2 KG/M2 | HEIGHT: 66 IN | SYSTOLIC BLOOD PRESSURE: 120 MMHG | HEART RATE: 88 BPM | WEIGHT: 219 LBS | TEMPERATURE: 97 F | DIASTOLIC BLOOD PRESSURE: 72 MMHG

## 2023-03-10 DIAGNOSIS — G47.33 OSA ON CPAP: ICD-10-CM

## 2023-03-10 DIAGNOSIS — E66.01 MORBID OBESITY (HCC): ICD-10-CM

## 2023-03-10 DIAGNOSIS — I10 PRIMARY HYPERTENSION: ICD-10-CM

## 2023-03-10 DIAGNOSIS — E78.00 PURE HYPERCHOLESTEROLEMIA: ICD-10-CM

## 2023-03-10 PROCEDURE — 99214 OFFICE O/P EST MOD 30 MIN: CPT | Performed by: FAMILY MEDICINE

## 2023-03-10 ASSESSMENT — FIBROSIS 4 INDEX: FIB4 SCORE: 1.02

## 2023-03-10 NOTE — PROGRESS NOTES
Subjective:   CC: Sleep apnea    HPI:     Lauren Esquivel is a 72 y.o. female, established patient of the clinic.     Chronic SENTHIL, she uses CPAP nightly as directed.  Patient has not been following-up with her sleep doctor for several years as his practice closes.  Her CPAP break down recently.  Patient has been without CPAP for a week.  She complains of daytime fatigue and daytime hypersomnolence.  She wishes to have her CPAP replacement.  Patient has chronic hypertension, prediabetes, and hyperlipidemia.  She is taking telmisartan 40 mg daily, metformin 500 mg twice daily, and Lipitor 20 mg.  Daily.  She tolerates all medication well, no side effect reported.  She missed her last appointment for annual wellness visit.      Current medicines (including changes today)  Current Outpatient Medications   Medication Sig Dispense Refill    SYMBICORT 160-4.5 MCG/ACT Aerosol INHALE 2 PUFFS 2 TIMES A DAY 10.2 Each 11    atorvastatin (LIPITOR) 20 MG Tab TAKE 1 TABLET BY MOUTH EVERYDAY AT BEDTIME 90 Tablet 3    telmisartan (MICARDIS) 40 MG Tab TAKE 1 TABLET BY MOUTH EVERY DAY 90 Tablet 3    metFORMIN ER (GLUCOPHAGE XR) 500 MG TABLET SR 24 HR Take 1 Tablet by mouth 2 times a day. 180 Tablet 3    albuterol (PROVENTIL) 2.5mg/3ml Nebu Soln solution for nebulization 3 mL by Nebulization route every 6 hours as needed for Shortness of Breath. 75 mL 0    albuterol 108 (90 Base) MCG/ACT Aero Soln inhalation aerosol Inhale 2 Puffs by mouth every 6 hours as needed for Shortness of Breath. 8.5 g 2     No current facility-administered medications for this visit.     She  has a past medical history of Anemia, Anxiety state, unspecified (1/3/2010), ASTHMA, Chronic rhinitis (1/3/2010), COPD, Elevated C-reactive protein (CRP) (2/6/2012), H/O pyelonephritis (8/21/2012), H/O: female infertility (8/10/2011), History of nephrolithiasis (8/21/2012), HTN, HTN (hypertension) (8/11/2010), Hyperglycemia (6/4/2012), Hyperlipidemia (8/28/2010),  "Influenza A with pneumonia (12/27/2016), Kidney filling defect (8/21/2012), Laryngeal spasm (1/3/2010), Metabolic syndrome (6/4/2012), Mycoplasma pneumonia (2003), Obesity (6/4/2012), Preventative health care (8/11/2010), Pyelonephritis (7/30/2012), Respiratory malfunction arising from mental factors (1/3/2010), Sleep apnea, Vitamin d deficiency (8/28/2010), and Wheeze (12/4/2009).    She has no past medical history of Encounter for long-term (current) use of other medications.    I reviewed patient's problem list, allergies, medications, family hx, social hx with patient and update EPIC.        Objective:     /72 (BP Location: Right arm, Patient Position: Sitting, BP Cuff Size: Adult)   Pulse 88   Temp 36.1 °C (97 °F) (Temporal)   Ht 1.676 m (5' 6\")   Wt 99.3 kg (219 lb)   SpO2 96%  Body mass index is 35.35 kg/m².    Physical Exam:  Constitutional: awake, alert, in no distress.  Skin: Warm, dry, good turgor, no rashes, bruises, ulcers in visible areas.  Eye: conjunctiva clear, lids neg for edema or lesions.  Psych: Oriented x3, affect and mood wnl, intact judgement and insight.       Assessment and Plan:   The following treatment plan was discussed    1. SENTHIL on CPAP  Chronic, compliant to treatment.  Her CPAP machine is old and broke down recently.  Patient needs CPAP replacement.  - DME CPAP    2. Primary hypertension  Chronic, controlled with telmisartan 40 mg daily, no s/e reported, will continue.      3. Pure hypercholesterolemia  Chronic, controlled with Lipitor 20 g daily, no s/e reported, will continue.       4.  Prediabetes  Chronic, controlled with metformin 500 mg twice daily, no s/e reported, will continue.      5. Morbid obesity   - Patient identified as having weight management issue.  Appropriate orders and counseling given.          Yessy Paz M.D.      Followup: Return in about 3 months (around 6/10/2023) for Annual wellness visit.    Please note that this dictation was created using " voice recognition software. I have made every reasonable attempt to correct obvious errors, but I expect that there are errors of grammar and possibly content that I did not discover before finalizing the note.

## 2023-03-29 ENCOUNTER — TELEPHONE (OUTPATIENT)
Dept: MEDICAL GROUP | Age: 73
End: 2023-03-29

## 2023-03-29 NOTE — TELEPHONE ENCOUNTER
Patient stopped by the office to drop off C-Pap paperwork for Dr. Paz to sign. She is requesting it ASAP because she is unable to get her machine until the paperwork is signed. The blank paperwork is in the , and the hard copy is on Dr. Paz's desk.    She needs the paperwork faxed back to [phone].     I told her I would call her once it has been faxed.    Thank you

## 2023-04-19 ENCOUNTER — OFFICE VISIT (OUTPATIENT)
Dept: URGENT CARE | Facility: CLINIC | Age: 73
End: 2023-04-19
Payer: MEDICARE

## 2023-04-19 VITALS
OXYGEN SATURATION: 94 % | WEIGHT: 219 LBS | SYSTOLIC BLOOD PRESSURE: 116 MMHG | HEART RATE: 77 BPM | RESPIRATION RATE: 16 BRPM | HEIGHT: 65 IN | BODY MASS INDEX: 36.49 KG/M2 | DIASTOLIC BLOOD PRESSURE: 68 MMHG | TEMPERATURE: 97.6 F

## 2023-04-19 DIAGNOSIS — J06.9 VIRAL URI WITH COUGH: ICD-10-CM

## 2023-04-19 DIAGNOSIS — Z87.09 HISTORY OF ASTHMA: ICD-10-CM

## 2023-04-19 LAB
FLUAV RNA SPEC QL NAA+PROBE: NEGATIVE
FLUBV RNA SPEC QL NAA+PROBE: NEGATIVE
RSV RNA SPEC QL NAA+PROBE: NEGATIVE
SARS-COV-2 RNA RESP QL NAA+PROBE: NEGATIVE

## 2023-04-19 PROCEDURE — 99213 OFFICE O/P EST LOW 20 MIN: CPT | Performed by: PHYSICIAN ASSISTANT

## 2023-04-19 PROCEDURE — 0241U POCT CEPHEID COV-2, FLU A/B, RSV - PCR: CPT | Performed by: PHYSICIAN ASSISTANT

## 2023-04-19 RX ORDER — ALBUTEROL SULFATE 90 UG/1
2 AEROSOL, METERED RESPIRATORY (INHALATION) EVERY 6 HOURS PRN
Qty: 8.5 G | Refills: 0 | Status: SHIPPED | OUTPATIENT
Start: 2023-04-19 | End: 2023-07-08

## 2023-04-19 ASSESSMENT — ENCOUNTER SYMPTOMS
FEVER: 0
COUGH: 1
WHEEZING: 0
SORE THROAT: 0
HEMOPTYSIS: 0
HEADACHES: 0
MYALGIAS: 0
SHORTNESS OF BREATH: 0

## 2023-04-19 ASSESSMENT — FIBROSIS 4 INDEX: FIB4 SCORE: 1.02

## 2023-04-19 NOTE — RESULT ENCOUNTER NOTE
Wilber Aguilar,    Great news - your Covid 19 testing is negative. Feel better soon!    Kind regards,  MARK

## 2023-04-19 NOTE — PROGRESS NOTES
Subjective:   Lauren Esquivel is a 72 y.o. female who presents for Cough (X3 days, Sore throat, and slight runny nose )        Patient states that her brother developed atypical pneumonia secondary to viral illness last week.  His testing for COVID-19 was negative.    Cough  This is a new problem. Episode onset: 3 days. The problem has been gradually worsening. The cough is Productive of sputum. Pertinent negatives include no chest pain, ear pain, fever, headaches, hemoptysis, myalgias, sore throat, shortness of breath or wheezing. Associated symptoms comments: Laryngitis, weak, tired.. Treatments tried: fluids, fast alert mucinex. The treatment provided significant relief. Her past medical history is significant for asthma (takes symbicort and albuterol, prn. out of albuterl inhaler.). SENTHIL- uses CPAP.   Review of Systems   Constitutional:  Negative for fever.   HENT:  Negative for ear pain and sore throat.    Respiratory:  Positive for cough. Negative for hemoptysis, shortness of breath and wheezing.    Cardiovascular:  Negative for chest pain.   Musculoskeletal:  Negative for myalgias.   Neurological:  Negative for headaches.     PMH:  has a past medical history of Anemia, Anxiety state, unspecified (1/3/2010), ASTHMA, Chronic rhinitis (1/3/2010), COPD, Elevated C-reactive protein (CRP) (2/6/2012), H/O pyelonephritis (8/21/2012), H/O: female infertility (8/10/2011), History of nephrolithiasis (8/21/2012), HTN, HTN (hypertension) (8/11/2010), Hyperglycemia (6/4/2012), Hyperlipidemia (8/28/2010), Influenza A with pneumonia (12/27/2016), Kidney filling defect (8/21/2012), Laryngeal spasm (1/3/2010), Metabolic syndrome (6/4/2012), Mycoplasma pneumonia (2003), Obesity (6/4/2012), Preventative health care (8/11/2010), Pyelonephritis (7/30/2012), Respiratory malfunction arising from mental factors (1/3/2010), Sleep apnea, Vitamin d deficiency (8/28/2010), and Wheeze (12/4/2009).    She has no past medical history  "of Encounter for long-term (current) use of other medications.  MEDS:   Current Outpatient Medications:     albuterol 108 (90 Base) MCG/ACT Aero Soln inhalation aerosol, Inhale 2 Puffs every 6 hours as needed for Shortness of Breath., Disp: 8.5 g, Rfl: 0    SYMBICORT 160-4.5 MCG/ACT Aerosol, INHALE 2 PUFFS 2 TIMES A DAY, Disp: 10.2 Each, Rfl: 11    atorvastatin (LIPITOR) 20 MG Tab, TAKE 1 TABLET BY MOUTH EVERYDAY AT BEDTIME, Disp: 90 Tablet, Rfl: 3    telmisartan (MICARDIS) 40 MG Tab, TAKE 1 TABLET BY MOUTH EVERY DAY, Disp: 90 Tablet, Rfl: 3    metFORMIN ER (GLUCOPHAGE XR) 500 MG TABLET SR 24 HR, Take 1 Tablet by mouth 2 times a day., Disp: 180 Tablet, Rfl: 3    albuterol (PROVENTIL) 2.5mg/3ml Nebu Soln solution for nebulization, 3 mL by Nebulization route every 6 hours as needed for Shortness of Breath., Disp: 75 mL, Rfl: 0    albuterol 108 (90 Base) MCG/ACT Aero Soln inhalation aerosol, Inhale 2 Puffs by mouth every 6 hours as needed for Shortness of Breath., Disp: 8.5 g, Rfl: 2  ALLERGIES:   Allergies   Allergen Reactions    Ace Inhibitors      Cough    Ciprofloxacin      SURGHX:   Past Surgical History:   Procedure Laterality Date    LAPAROSCOPY      for infertility    TONSILLECTOMY AND ADENOIDECTOMY       SOCHX:  reports that she has never smoked. She has never used smokeless tobacco. She reports that she does not drink alcohol and does not use drugs.  FH: Family history was reviewed, no pertinent findings to report   Objective:   /68   Pulse 77   Temp 36.4 °C (97.6 °F) (Temporal)   Resp 16   Ht 1.651 m (5' 5\")   Wt 99.3 kg (219 lb)   LMP 06/04/2005 (LMP Unknown)   SpO2 94%   BMI 36.44 kg/m²   Physical Exam  Vitals reviewed.   Constitutional:       General: She is not in acute distress.     Appearance: Normal appearance. She is well-developed. She is not toxic-appearing.   HENT:      Head: Normocephalic and atraumatic.      Right Ear: Tympanic membrane, ear canal and external ear normal.      Left " Ear: Tympanic membrane, ear canal and external ear normal.      Nose: Rhinorrhea present.      Mouth/Throat:      Lips: Pink.      Mouth: Mucous membranes are moist.      Pharynx: Oropharynx is clear. Uvula midline.      Comments: Patient has a hoarse voice.  Cardiovascular:      Rate and Rhythm: Normal rate and regular rhythm.      Heart sounds: Normal heart sounds, S1 normal and S2 normal.   Pulmonary:      Effort: Pulmonary effort is normal. No respiratory distress.      Breath sounds: Normal breath sounds. No stridor. No decreased breath sounds, wheezing, rhonchi or rales.   Skin:     General: Skin is dry.   Neurological:      Comments: Alert and oriented.    Psychiatric:         Speech: Speech normal.         Behavior: Behavior normal.         Results for orders placed or performed in visit on 04/19/23   POCT CoV-2, Flu A/B, RSV by PCR   Result Value Ref Range    SARS-CoV-2 by PCR Negative Negative, Invalid    Influenza virus A RNA Negative Negative, Invalid    Influenza virus B, PCR Negative Negative, Invalid    RSV, PCR Negative Negative, Invalid       Assessment/Plan:   1. Viral URI with cough  - POCT CoV-2, Flu A/B, RSV by PCR    2. History of asthma  - albuterol 108 (90 Base) MCG/ACT Aero Soln inhalation aerosol; Inhale 2 Puffs every 6 hours as needed for Shortness of Breath.  Dispense: 8.5 g; Refill: 0    Considerations include but not limited to viral URI, sinusitis, allergic rhinitis, influenza, COVID-19.  Testing for COVID-19, influenza, RSV, group A strep are negative.  No evidence of lower respiratory involvement on exam today.      Recommend symptomatic care.  12-hour Mucinex or Mucinex D as needed for congestion.  May also perform nasal saline rinses 2-3 times a day and begin Flonase daily.  Recommend salt water gargles, lozenges, hot tea with honey, and ibuprofen as needed for sore throat.  Tylenol or ibuprofen as needed for fever control, body aches, and headaches.  Ensure adequate rest and  hydration.    If symptoms fail to improve within 72 hours, new symptoms develop, symptoms worsen return to clinic or see PCP for reevaluation.    2.  Patient has history of asthma and is unable to locate her albuterol inhaler.  Sent new prescription to patient's pharmacy.  Follow-up with PCP for additional refills.

## 2023-07-07 ENCOUNTER — HOSPITAL ENCOUNTER (INPATIENT)
Facility: MEDICAL CENTER | Age: 73
LOS: 2 days | DRG: 552 | End: 2023-07-10
Attending: EMERGENCY MEDICINE | Admitting: INTERNAL MEDICINE
Payer: MEDICARE

## 2023-07-07 ENCOUNTER — APPOINTMENT (OUTPATIENT)
Dept: RADIOLOGY | Facility: MEDICAL CENTER | Age: 73
DRG: 552 | End: 2023-07-07
Attending: EMERGENCY MEDICINE
Payer: MEDICARE

## 2023-07-07 DIAGNOSIS — S09.90XA CLOSED HEAD INJURY, INITIAL ENCOUNTER: ICD-10-CM
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
## 2023-07-07 DIAGNOSIS — S32.010A COMPRESSION FRACTURE OF L1 VERTEBRA, INITIAL ENCOUNTER (HCC): Primary | ICD-10-CM

## 2023-07-07 PROCEDURE — 72128 CT CHEST SPINE W/O DYE: CPT

## 2023-07-07 PROCEDURE — 36415 COLL VENOUS BLD VENIPUNCTURE: CPT

## 2023-07-07 PROCEDURE — 70450 CT HEAD/BRAIN W/O DYE: CPT

## 2023-07-07 PROCEDURE — A9270 NON-COVERED ITEM OR SERVICE: HCPCS | Performed by: EMERGENCY MEDICINE

## 2023-07-07 PROCEDURE — 72131 CT LUMBAR SPINE W/O DYE: CPT

## 2023-07-07 PROCEDURE — 72125 CT NECK SPINE W/O DYE: CPT

## 2023-07-07 PROCEDURE — 700102 HCHG RX REV CODE 250 W/ 637 OVERRIDE(OP): Performed by: EMERGENCY MEDICINE

## 2023-07-07 PROCEDURE — 99285 EMERGENCY DEPT VISIT HI MDM: CPT

## 2023-07-07 RX ORDER — ACETAMINOPHEN 500 MG
1000 TABLET ORAL ONCE
Status: DISCONTINUED | OUTPATIENT
Start: 2023-07-08 | End: 2023-07-08

## 2023-07-07 RX ORDER — OXYCODONE HYDROCHLORIDE 5 MG/1
5 TABLET ORAL ONCE
Status: COMPLETED | OUTPATIENT
Start: 2023-07-08 | End: 2023-07-07

## 2023-07-07 RX ADMIN — IBUPROFEN 800 MG: 200 TABLET, FILM COATED ORAL at 23:55

## 2023-07-07 RX ADMIN — OXYCODONE HYDROCHLORIDE 5 MG: 5 TABLET ORAL at 23:54

## 2023-07-07 ASSESSMENT — FIBROSIS 4 INDEX: FIB4 SCORE: 1.02

## 2023-07-08 ENCOUNTER — APPOINTMENT (OUTPATIENT)
Dept: RADIOLOGY | Facility: MEDICAL CENTER | Age: 73
DRG: 552 | End: 2023-07-08
Attending: INTERNAL MEDICINE
Payer: MEDICARE

## 2023-07-08 PROBLEM — S32.010A CLOSED COMPRESSION FRACTURE OF L1 VERTEBRA (HCC): Status: ACTIVE | Noted: 2023-07-08

## 2023-07-08 PROBLEM — S32.010A CLOSED COMPRESSION FRACTURE OF L1 LUMBAR VERTEBRA, INITIAL ENCOUNTER (HCC): Status: ACTIVE | Noted: 2023-07-08

## 2023-07-08 PROBLEM — M54.50 LOW BACK PAIN: Status: ACTIVE | Noted: 2023-07-08

## 2023-07-08 PROBLEM — J45.909 ASTHMA: Status: ACTIVE | Noted: 2023-07-08

## 2023-07-08 LAB
ANION GAP SERPL CALC-SCNC: 12 MMOL/L (ref 7–16)
BUN SERPL-MCNC: 20 MG/DL (ref 8–22)
CALCIUM SERPL-MCNC: 9.3 MG/DL (ref 8.5–10.5)
CHLORIDE SERPL-SCNC: 105 MMOL/L (ref 96–112)
CO2 SERPL-SCNC: 21 MMOL/L (ref 20–33)
CREAT SERPL-MCNC: 0.63 MG/DL (ref 0.5–1.4)
EST. AVERAGE GLUCOSE BLD GHB EST-MCNC: 137 MG/DL
GFR SERPLBLD CREATININE-BSD FMLA CKD-EPI: 93 ML/MIN/1.73 M 2
GLUCOSE BLD STRIP.AUTO-MCNC: 109 MG/DL (ref 65–99)
GLUCOSE BLD STRIP.AUTO-MCNC: 114 MG/DL (ref 65–99)
GLUCOSE SERPL-MCNC: 107 MG/DL (ref 65–99)
HBA1C MFR BLD: 6.4 % (ref 4–5.6)
MAGNESIUM SERPL-MCNC: 1.9 MG/DL (ref 1.5–2.5)
POTASSIUM SERPL-SCNC: 4 MMOL/L (ref 3.6–5.5)
SODIUM SERPL-SCNC: 138 MMOL/L (ref 135–145)

## 2023-07-08 PROCEDURE — 700111 HCHG RX REV CODE 636 W/ 250 OVERRIDE (IP): Performed by: INTERNAL MEDICINE

## 2023-07-08 PROCEDURE — 83735 ASSAY OF MAGNESIUM: CPT

## 2023-07-08 PROCEDURE — 80048 BASIC METABOLIC PNL TOTAL CA: CPT

## 2023-07-08 PROCEDURE — A9270 NON-COVERED ITEM OR SERVICE: HCPCS | Performed by: STUDENT IN AN ORGANIZED HEALTH CARE EDUCATION/TRAINING PROGRAM

## 2023-07-08 PROCEDURE — 82962 GLUCOSE BLOOD TEST: CPT | Mod: 91

## 2023-07-08 PROCEDURE — 83036 HEMOGLOBIN GLYCOSYLATED A1C: CPT

## 2023-07-08 PROCEDURE — 72148 MRI LUMBAR SPINE W/O DYE: CPT

## 2023-07-08 PROCEDURE — 700101 HCHG RX REV CODE 250: Performed by: INTERNAL MEDICINE

## 2023-07-08 PROCEDURE — A9270 NON-COVERED ITEM OR SERVICE: HCPCS | Performed by: INTERNAL MEDICINE

## 2023-07-08 PROCEDURE — 700102 HCHG RX REV CODE 250 W/ 637 OVERRIDE(OP): Performed by: INTERNAL MEDICINE

## 2023-07-08 PROCEDURE — 700102 HCHG RX REV CODE 250 W/ 637 OVERRIDE(OP): Performed by: STUDENT IN AN ORGANIZED HEALTH CARE EDUCATION/TRAINING PROGRAM

## 2023-07-08 PROCEDURE — 99222 1ST HOSP IP/OBS MODERATE 55: CPT | Performed by: STUDENT IN AN ORGANIZED HEALTH CARE EDUCATION/TRAINING PROGRAM

## 2023-07-08 PROCEDURE — 770006 HCHG ROOM/CARE - MED/SURG/GYN SEMI*

## 2023-07-08 PROCEDURE — A9270 NON-COVERED ITEM OR SERVICE: HCPCS | Performed by: EMERGENCY MEDICINE

## 2023-07-08 PROCEDURE — 700102 HCHG RX REV CODE 250 W/ 637 OVERRIDE(OP): Performed by: EMERGENCY MEDICINE

## 2023-07-08 RX ORDER — LIDOCAINE 50 MG/G
1 PATCH TOPICAL EVERY 24 HOURS
Status: DISCONTINUED | OUTPATIENT
Start: 2023-07-08 | End: 2023-07-10 | Stop reason: HOSPADM

## 2023-07-08 RX ORDER — IBUPROFEN 800 MG/1
800 TABLET ORAL EVERY 8 HOURS PRN
Qty: 9 TABLET | Refills: 0 | Status: SHIPPED | OUTPATIENT
Start: 2023-07-08 | End: 2023-07-10

## 2023-07-08 RX ORDER — ALBUTEROL SULFATE 90 UG/1
2 AEROSOL, METERED RESPIRATORY (INHALATION)
Status: DISCONTINUED | OUTPATIENT
Start: 2023-07-08 | End: 2023-07-10 | Stop reason: HOSPADM

## 2023-07-08 RX ORDER — LIDOCAINE 50 MG/G
1 PATCH TOPICAL EVERY 24 HOURS
Qty: 5 PATCH | Refills: 0 | Status: SHIPPED | OUTPATIENT
Start: 2023-07-08 | End: 2023-07-13

## 2023-07-08 RX ORDER — CYCLOBENZAPRINE HCL 10 MG
10 TABLET ORAL 3 TIMES DAILY PRN
Qty: 10 TABLET | Refills: 0 | Status: SHIPPED | OUTPATIENT
Start: 2023-07-08 | End: 2023-07-11

## 2023-07-08 RX ORDER — ALBUTEROL SULFATE 2.5 MG/3ML
2.5 SOLUTION RESPIRATORY (INHALATION) EVERY 6 HOURS PRN
Status: DISCONTINUED | OUTPATIENT
Start: 2023-07-08 | End: 2023-07-08

## 2023-07-08 RX ORDER — HYDROMORPHONE HYDROCHLORIDE 1 MG/ML
0.25 INJECTION, SOLUTION INTRAMUSCULAR; INTRAVENOUS; SUBCUTANEOUS
Status: DISCONTINUED | OUTPATIENT
Start: 2023-07-08 | End: 2023-07-10 | Stop reason: HOSPADM

## 2023-07-08 RX ORDER — POLYETHYLENE GLYCOL 3350 17 G/17G
1 POWDER, FOR SOLUTION ORAL
Status: DISCONTINUED | OUTPATIENT
Start: 2023-07-08 | End: 2023-07-10 | Stop reason: HOSPADM

## 2023-07-08 RX ORDER — TELMISARTAN 40 MG/1
40 TABLET ORAL DAILY
Status: DISCONTINUED | OUTPATIENT
Start: 2023-07-08 | End: 2023-07-10 | Stop reason: HOSPADM

## 2023-07-08 RX ORDER — BUDESONIDE AND FORMOTEROL FUMARATE DIHYDRATE 160; 4.5 UG/1; UG/1
2 AEROSOL RESPIRATORY (INHALATION) 2 TIMES DAILY
Status: DISCONTINUED | OUTPATIENT
Start: 2023-07-08 | End: 2023-07-08

## 2023-07-08 RX ORDER — OXYCODONE HYDROCHLORIDE 5 MG/1
5 TABLET ORAL ONCE
Status: COMPLETED | OUTPATIENT
Start: 2023-07-08 | End: 2023-07-08

## 2023-07-08 RX ORDER — ALBUTEROL SULFATE 2.5 MG/3ML
2.5 SOLUTION RESPIRATORY (INHALATION)
Status: DISCONTINUED | OUTPATIENT
Start: 2023-07-08 | End: 2023-07-10 | Stop reason: HOSPADM

## 2023-07-08 RX ORDER — AMOXICILLIN 250 MG
2 CAPSULE ORAL 2 TIMES DAILY
Status: DISCONTINUED | OUTPATIENT
Start: 2023-07-08 | End: 2023-07-10 | Stop reason: HOSPADM

## 2023-07-08 RX ORDER — ACETAMINOPHEN 325 MG/1
650 TABLET ORAL EVERY 6 HOURS PRN
Status: DISCONTINUED | OUTPATIENT
Start: 2023-07-08 | End: 2023-07-10 | Stop reason: HOSPADM

## 2023-07-08 RX ORDER — CALCITONIN SALMON 200 [IU]/.09ML
1 SPRAY, METERED NASAL DAILY
Status: DISCONTINUED | OUTPATIENT
Start: 2023-07-08 | End: 2023-07-10 | Stop reason: HOSPADM

## 2023-07-08 RX ORDER — METFORMIN HYDROCHLORIDE 500 MG/1
500 TABLET, EXTENDED RELEASE ORAL 2 TIMES DAILY
Status: DISCONTINUED | OUTPATIENT
Start: 2023-07-08 | End: 2023-07-08

## 2023-07-08 RX ORDER — KETOROLAC TROMETHAMINE 30 MG/ML
15 INJECTION, SOLUTION INTRAMUSCULAR; INTRAVENOUS EVERY 6 HOURS PRN
Status: DISCONTINUED | OUTPATIENT
Start: 2023-07-08 | End: 2023-07-10 | Stop reason: HOSPADM

## 2023-07-08 RX ORDER — LABETALOL HYDROCHLORIDE 5 MG/ML
10 INJECTION, SOLUTION INTRAVENOUS EVERY 4 HOURS PRN
Status: DISCONTINUED | OUTPATIENT
Start: 2023-07-08 | End: 2023-07-10 | Stop reason: HOSPADM

## 2023-07-08 RX ORDER — ONDANSETRON 4 MG/1
4 TABLET, ORALLY DISINTEGRATING ORAL EVERY 4 HOURS PRN
Status: DISCONTINUED | OUTPATIENT
Start: 2023-07-08 | End: 2023-07-10 | Stop reason: HOSPADM

## 2023-07-08 RX ORDER — HYDROCODONE BITARTRATE AND ACETAMINOPHEN 5; 325 MG/1; MG/1
1 TABLET ORAL EVERY 4 HOURS PRN
Qty: 12 TABLET | Refills: 0 | Status: SHIPPED | OUTPATIENT
Start: 2023-07-08 | End: 2023-07-10 | Stop reason: SDUPTHER

## 2023-07-08 RX ORDER — ONDANSETRON 2 MG/ML
4 INJECTION INTRAMUSCULAR; INTRAVENOUS EVERY 4 HOURS PRN
Status: DISCONTINUED | OUTPATIENT
Start: 2023-07-08 | End: 2023-07-10 | Stop reason: HOSPADM

## 2023-07-08 RX ORDER — LORAZEPAM 2 MG/ML
0.5 INJECTION INTRAMUSCULAR
Status: DISCONTINUED | OUTPATIENT
Start: 2023-07-08 | End: 2023-07-10 | Stop reason: HOSPADM

## 2023-07-08 RX ORDER — BISACODYL 10 MG
10 SUPPOSITORY, RECTAL RECTAL
Status: DISCONTINUED | OUTPATIENT
Start: 2023-07-08 | End: 2023-07-10 | Stop reason: HOSPADM

## 2023-07-08 RX ORDER — OXYCODONE HYDROCHLORIDE 5 MG/1
5 TABLET ORAL
Status: DISCONTINUED | OUTPATIENT
Start: 2023-07-08 | End: 2023-07-10 | Stop reason: HOSPADM

## 2023-07-08 RX ORDER — OXYCODONE HYDROCHLORIDE 5 MG/1
2.5 TABLET ORAL
Status: DISCONTINUED | OUTPATIENT
Start: 2023-07-08 | End: 2023-07-10 | Stop reason: HOSPADM

## 2023-07-08 RX ORDER — ATORVASTATIN CALCIUM 20 MG/1
20 TABLET, FILM COATED ORAL
Status: DISCONTINUED | OUTPATIENT
Start: 2023-07-09 | End: 2023-07-10 | Stop reason: HOSPADM

## 2023-07-08 RX ADMIN — MOMETASONE FUROATE AND FORMOTEROL FUMARATE DIHYDRATE 2 PUFF: 200; 5 AEROSOL RESPIRATORY (INHALATION) at 05:36

## 2023-07-08 RX ADMIN — CALCITONIN SALMON 200 UNITS: 200 SPRAY, METERED NASAL at 15:42

## 2023-07-08 RX ADMIN — SENNOSIDES AND DOCUSATE SODIUM 2 TABLET: 50; 8.6 TABLET ORAL at 18:20

## 2023-07-08 RX ADMIN — TELMISARTAN 40 MG: 40 TABLET ORAL at 05:37

## 2023-07-08 RX ADMIN — OXYCODONE HYDROCHLORIDE 5 MG: 5 TABLET ORAL at 20:26

## 2023-07-08 RX ADMIN — OXYCODONE HYDROCHLORIDE 5 MG: 5 TABLET ORAL at 09:12

## 2023-07-08 RX ADMIN — MOMETASONE FUROATE AND FORMOTEROL FUMARATE DIHYDRATE 2 PUFF: 200; 5 AEROSOL RESPIRATORY (INHALATION) at 20:30

## 2023-07-08 RX ADMIN — METFORMIN HYDROCHLORIDE 500 MG: 500 TABLET, EXTENDED RELEASE ORAL at 05:37

## 2023-07-08 RX ADMIN — LIDOCAINE 1 PATCH: 50 PATCH TOPICAL at 15:43

## 2023-07-08 RX ADMIN — LORAZEPAM 0.5 MG: 2 INJECTION INTRAMUSCULAR; INTRAVENOUS at 13:11

## 2023-07-08 ASSESSMENT — PAIN DESCRIPTION - PAIN TYPE
TYPE: ACUTE PAIN

## 2023-07-08 ASSESSMENT — ENCOUNTER SYMPTOMS
NAUSEA: 0
SINUS PAIN: 0
FEVER: 0
ABDOMINAL PAIN: 0
BLURRED VISION: 0
VOMITING: 0
DIZZINESS: 0
SORE THROAT: 0
HEADACHES: 0
COUGH: 0
BACK PAIN: 1
PND: 0
HEARTBURN: 0
CHILLS: 0
DOUBLE VISION: 0
MYALGIAS: 0

## 2023-07-08 ASSESSMENT — LIFESTYLE VARIABLES
HAVE PEOPLE ANNOYED YOU BY CRITICIZING YOUR DRINKING: NO
AVERAGE NUMBER OF DAYS PER WEEK YOU HAVE A DRINK CONTAINING ALCOHOL: 0
ALCOHOL_USE: NO
TOTAL SCORE: 0
HAVE YOU EVER FELT YOU SHOULD CUT DOWN ON YOUR DRINKING: NO
EVER FELT BAD OR GUILTY ABOUT YOUR DRINKING: NO
EVER HAD A DRINK FIRST THING IN THE MORNING TO STEADY YOUR NERVES TO GET RID OF A HANGOVER: NO
DOES PATIENT WANT TO STOP DRINKING: NO
HOW MANY TIMES IN THE PAST YEAR HAVE YOU HAD 5 OR MORE DRINKS IN A DAY: 0
ON A TYPICAL DAY WHEN YOU DRINK ALCOHOL HOW MANY DRINKS DO YOU HAVE: 0
CONSUMPTION TOTAL: NEGATIVE

## 2023-07-08 ASSESSMENT — COGNITIVE AND FUNCTIONAL STATUS - GENERAL
TOILETING: A LOT
HELP NEEDED FOR BATHING: A LOT
TURNING FROM BACK TO SIDE WHILE IN FLAT BAD: A LITTLE
DRESSING REGULAR LOWER BODY CLOTHING: A LOT
SUGGESTED CMS G CODE MODIFIER DAILY ACTIVITY: CK
MOBILITY SCORE: 15
MOVING FROM LYING ON BACK TO SITTING ON SIDE OF FLAT BED: A LOT
SUGGESTED CMS G CODE MODIFIER MOBILITY: CK
MOVING TO AND FROM BED TO CHAIR: A LOT
STANDING UP FROM CHAIR USING ARMS: A LOT
WALKING IN HOSPITAL ROOM: A LITTLE
CLIMB 3 TO 5 STEPS WITH RAILING: A LITTLE
DAILY ACTIVITIY SCORE: 18

## 2023-07-08 ASSESSMENT — PATIENT HEALTH QUESTIONNAIRE - PHQ9
2. FEELING DOWN, DEPRESSED, IRRITABLE, OR HOPELESS: NOT AT ALL
SUM OF ALL RESPONSES TO PHQ9 QUESTIONS 1 AND 2: 0
1. LITTLE INTEREST OR PLEASURE IN DOING THINGS: NOT AT ALL

## 2023-07-08 ASSESSMENT — COPD QUESTIONNAIRES
COPD SCREENING SCORE: 3
HAVE YOU SMOKED AT LEAST 100 CIGARETTES IN YOUR ENTIRE LIFE: NO/DON'T KNOW
DURING THE PAST 4 WEEKS HOW MUCH DID YOU FEEL SHORT OF BREATH: SOME OF THE TIME
DO YOU EVER COUGH UP ANY MUCUS OR PHLEGM?: NO/ONLY WITH OCCASIONAL COLDS OR INFECTIONS

## 2023-07-08 ASSESSMENT — PAIN SCALES - PAIN ASSESSMENT IN ADVANCED DEMENTIA (PAINAD)
BREATHING: NORMAL
BREATHING: NORMAL

## 2023-07-08 ASSESSMENT — FIBROSIS 4 INDEX: FIB4 SCORE: 1.02

## 2023-07-08 NOTE — PROGRESS NOTES
4 Eyes Skin Assessment Completed by CECI Laws and CECI Moreno.    Head WDL  Ears WDL  Nose WDL  Mouth WDL  Neck WDL  Breast/Chest WDL  Shoulder Blades WDL  Spine WDL  (R) Arm/Elbow/Hand WDL  (L) Arm/Elbow/Hand WDL  Abdomen WDL  Groin WDL  Scrotum/Coccyx/Buttocks WDL  (R) Leg WDL  (L) Leg WDL  (R) Heel/Foot/Toe WDL  (L) Heel/Foot/Toe WDL          Devices In Places Nasal Cannula      Interventions In Place N/A    Possible Skin Injury No    Pictures Uploaded Into Epic N/A  Wound Consult Placed N/A  RN Wound Prevention Protocol Ordered No

## 2023-07-08 NOTE — ED NOTES
Med Rec PARTIAL per Pt at bedside. Pt's home pharmacy is closed at this time to verify Lisinopril dosing. Per Reconciled Outside Information it appears there was a fill for Telmisartan 40mg on 6/15/23 for a 90 day supply.   Allergies reviewed.  Home Pharmacy:  [facility]

## 2023-07-08 NOTE — ED NOTES
Assumed care of pt, received bedside report from CECI Field   Cardiac and spO2 monitor on, call light within reach.

## 2023-07-08 NOTE — DISCHARGE INSTRUCTIONS
Discharge Instructions per Cristian Klein M.D.    DIAGNOSIS: Your imaging showed an L1 compression fracture.  He has been set up with home physical and occupational therapy.  For your back pain you can utilize Flexeril which is a muscle relaxer along with ibuprofen and Tylenol as needed.  I also prescribed you Norco, which is a opiate pain medication, you can use for severe pain.  Norco can make some people constipated and you cannot drive while taking Norco.  I also prescribed stool softeners to help you avoid constipation.

## 2023-07-08 NOTE — ED PROVIDER NOTES
ED Provider Note    Scribed for Jovanny Lawson by Umesh Caban. 7/7/2023  11:21 PM    Primary care provider: Yessy Paz M.D.  Means of arrival: EMS  History obtained from: Patient  History limited by: None    CHIEF COMPLAINT  Chief Complaint   Patient presents with    Fall    Closed Head Injury           Low Back Pain     EXTERNAL RECORDS REVIEWED  Outpatient Notes Seen in April 2023 for viral URI, COVID negative    HPI/ROS    LIMITATION TO HISTORY   Select: : None  OUTSIDE HISTORIAN(S):  EMS provided collateral information    HPI  Lauren Esquivel is a 72 y.o. female who presents to the Emergency Department via EMS as a TBI alert following a GLF one hour ago. She was cooking when she fell backward and hit her head and lower back. She told EMS that the pain in her lower back is much worse than in her head. She does not take any blood thinners, she is on lisinopril and Metformin. She denies any loss of consciousness, loss of sensation or nausea. She took Tylenol 20 minutes ago for the pain.     REVIEW OF SYSTEMS  As above, all other systems reviewed and are negative.   See HPI for further details.     PAST MEDICAL HISTORY   has a past medical history of Anemia, Anxiety state, unspecified (1/3/2010), ASTHMA, Chronic rhinitis (1/3/2010), COPD, Elevated C-reactive protein (CRP) (2/6/2012), H/O pyelonephritis (8/21/2012), H/O: female infertility (8/10/2011), History of nephrolithiasis (8/21/2012), HTN, HTN (hypertension) (8/11/2010), Hyperglycemia (6/4/2012), Hyperlipidemia (8/28/2010), Influenza A with pneumonia (12/27/2016), Kidney filling defect (8/21/2012), Laryngeal spasm (1/3/2010), Metabolic syndrome (6/4/2012), Mycoplasma pneumonia (2003), Obesity (6/4/2012), Preventative health care (8/11/2010), Pyelonephritis (7/30/2012), Respiratory malfunction arising from mental factors (1/3/2010), Sleep apnea, Vitamin d deficiency (8/28/2010), and Wheeze (12/4/2009).    SURGICAL HISTORY   has a past surgical  "history that includes laparoscopy and tonsillectomy and adenoidectomy.    SOCIAL HISTORY  Social History     Tobacco Use    Smoking status: Never    Smokeless tobacco: Never   Vaping Use    Vaping Use: Never used   Substance Use Topics    Alcohol use: No     Alcohol/week: 0.0 oz    Drug use: No      Social History     Substance and Sexual Activity   Drug Use No     FAMILY HISTORY  Family History   Problem Relation Age of Onset    Lung Disease Mother     Hypertension Mother     Heart Disease Father     Hypertension Maternal Grandfather     Stroke Brother     Heart Disease Brother      CURRENT MEDICATIONS  Current Outpatient Medications   Medication Instructions    albuterol (PROVENTIL) 2.5 mg, Nebulization, EVERY 6 HOURS PRN    albuterol 108 (90 Base) MCG/ACT Aero Soln inhalation aerosol 2 Puffs, Inhalation, EVERY 6 HOURS PRN    albuterol 108 (90 Base) MCG/ACT Aero Soln inhalation aerosol 2 Puffs, Inhalation, EVERY 6 HOURS PRN    atorvastatin (LIPITOR) 20 MG Tab TAKE 1 TABLET BY MOUTH EVERYDAY AT BEDTIME    metFORMIN ER (GLUCOPHAGE XR) 500 mg, Oral, 2 TIMES DAILY    SYMBICORT 160-4.5 MCG/ACT Aerosol INHALE 2 PUFFS 2 TIMES A DAY    telmisartan (MICARDIS) 40 MG Tab TAKE 1 TABLET BY MOUTH EVERY DAY     ALLERGIES  Allergies   Allergen Reactions    Ace Inhibitors      Cough    Ciprofloxacin     Levaquin [Levofloxacin]      PHYSICAL EXAM    VITAL SIGNS:   Vitals:    07/07/23 2327 07/07/23 2357 07/08/23 0030   BP:  (!) 155/81 137/63   Pulse:  78 69   Resp:  14 (!) 23   Temp:  36.7 °C (98.1 °F)    TempSrc:  Temporal    SpO2:  93% 89%   Weight: 95.3 kg (210 lb)     Height: 1.651 m (5' 5\")       Vitals: My interpretation: Borderline hypertensive, not tachycardic, afebrile, not hypoxic    Reinterpretation of vitals: Improved    PE:   Gen: sitting comfortably, speaking clearly, appears in no acute distress   Head: Contusion to the left parietal scalp.   ENT: Mucous membranes moist, posterior pharynx clear, uvula midline, nares " patent bilaterally   Neck: Supple, FROM  Pulmonary: Lungs are clear to auscultation bilaterally. No tachypnea  CV:  RRR, no murmur appreciated, pulses 2+ in both upper and lower extremities  Abdomen: soft, NT/ND; no rebound/guarding  : no CVA or suprapubic tenderness   Back: Tenderness in L4-L5, no step off or deformity  Neuro: A&Ox4 (person, place, time, situation), speech fluent, gait steady, no focal deficits appreciated  Skin: No rash or lesions.  No pallor or jaundice.  No cyanosis.  Warm and dry.     DIAGNOSTIC STUDIES / PROCEDURES    RADIOLOGY  I have independently interpreted the diagnostic imaging associated with this visit and am waiting the final reading from the radiologist.   My preliminary interpretation is a follows: No obvious intracranial hemorrhage on my independent interpretation  Radiologist interpretation is as follows:  CT-LSPINE W/O PLUS RECONS   Final Result      1.  Acute L1 compression fracture with minimal loss of height. There is questionable nondisplaced extension of the fracture into the T12 posterior elements.   2.  Multilevel degenerative changes.   3.  Degenerative grade 1 anterolisthesis of L4 on L5.   4.  Osteopenia.      CT-TSPINE W/O PLUS RECONS   Final Result      1.  L1 compression fracture with minimal loss of height. There is questionable extension into the T12 posterior elements.   2.  Osteopenia.   3.  S-shaped scoliosis of the thoracic spine.   4.  Small lytic lesion of the T12 vertebral body unchanged from 2012 CT, consistent with a benign etiology.   5.  Multilevel degenerative changes.      CT-CSPINE WITHOUT PLUS RECONS   Final Result      1.  No acute traumatic injury of the cervical spine.   2.  Minimal degenerative anterolisthesis of C4 on C5.   3.  Multilevel degenerative changes.   4.  1.6 cm right thyroid nodule. Recommend nonemergent dedicated thyroid ultrasound.      CT-HEAD W/O   Final Result      1.  No acute intracranial abnormality.   2.  Prominent  bilateral basal ganglia calcifications.   3.  Diffuse paranasal sinus disease.           COURSE & MEDICAL DECISION MAKING  Nursing notes, VS, PMSFHx, labs, imaging, EKG reviewed in chart.    ED Observation Status? Yes; I am placing the patient in to an observation status due to a diagnostic uncertainty as well as therapeutic intensity. Patient placed in observation status at 11:29 PM, 7/7/2023.     Observation plan is as follows: Monitor for symptom management and diagnostic results     Upon Reevaluation, the patient's condition has: Not improved and will require PT, OT, consideration of placement and placed on ED observation continuously and signed out to oncoming partner.    Ddx: strain vs sprain vs fracture vs dislocation vs intracranial hemorrhage    MDM: 11:21 PM Lauren Esquivel is a 72 y.o. female who presented with fall from ground-level.  Patient arrives as a traumatic brain injury alert and per protocol was seen with nursing staff at triage desk.  Fell backwards, hit her head and landed on her lower back.  Her pain in her lower back is the most severe and acute.  She does not think she lost consciousness.  She is not on any anticoagulation.  No nausea or vomiting, no loss of bladder or bowel continence.  Sent for emergent CT imaging of the head, C, T and L-spine as she has tenderness throughout her back.  Vital signs show initial hypertension but otherwise are fairly unremarkable.  CT imaging of her head independently interpreted by myself shows no intracranial bleeding, radiologist agrees.  There is a compression fracture of L1 that is could acute, minimal loss of height of the vertebral body.  Patient was treated with Motrin, Tylenol and Roxicodone.  Discussed all this with family at bedside who help provide collateral information regarding patient's presentation here tonight. After pain medications, pt attempted to ambulate with nursing staff and walker.  Patient is able to ambulate a little  distance but this time her pain is uncontrolled and she would like to stay in the hospital for the night and have physical therapy, Occupational Therapy evaluate in the morning and consideration of placement for rehabilitation due to her compression fracture of L1.  Paged hospitalist for ED observation consultation.  They are amenable and will place orders for the patient.  Patient will be signed out to oncoming ED provider who will continue to monitor until patient is evaluated.  Pain well controlled and vital signs improved and patient sleeping comfortably at time of signout.    In the chance that PT and OT see I recommend discharge tomorrow patient feels up for it, I have sent prescriptions for Norco, Motrin, Flexeril, lidocaine patches to the pharmacy and told to follow-up with the orthopedic team as needed in her discharge instructions.    ADDITIONAL PROBLEM LIST AND DISPOSITION    I have discussed management of the patient with the following physicians and VANITA's:  hospitalist     Discussion of management with other QHP or appropriate source(s): None     FINAL IMPRESSION  1. Compression fracture of L1 vertebra, initial encounter ([facility]) Acute   2. Closed head injury, initial encounter Acute   3. Fall, initial encounter Acute      Umesh PINTO (Veronica), am scribing for, and in the presence of, Jovanny Lawson.    Electronically signed by: Umesh Caban (Janinaibe), 7/7/2023    IJovanny personally performed the services described in this documentation, as scribed by Umesh Caban in my presence, and it is both accurate and complete.    The note accurately reflects work and decisions made by me.  Jovanny Lawson  7/8/2023  12:57 AM

## 2023-07-08 NOTE — ED PROVIDER NOTES
ED Observation Discharge Summary    Patient:Lauren Esquivel  Patient : 1950  Patient MRN: [id number]  Patient PCP: Yessy Paz M.D.    Admit Date: 2023  Discharge Date and Time: 23 10:06 AM  Discharge Diagnosis: L1 Compression Fracture  Discharge Attending: Irais Perera M.D.  Discharge Service: ED Observation    ED Course  Lauren is a 72 y.o. female who was evaluated at [facility] yesterday as a TBI alert after a fall from standing.  She reported that she was cooking, fell backwards, struck her head and lower back.  She reported pain in her low back at the time of the fall.  Physical exam on arrival notes tenderness in L4-L5 with no step-off or deformity.  CT imaging at time of presentation yesterday demonstrated an acute L1 compression fracture with minimal loss of height.  Questionable nondisplaced extension of the fracture into the T12 posterior elements.  Noted that she was treated with pain medication.  Also noted that Occupational Therapy should evaluation the patient in the morning for rehabilitation due to compression fracture of L1.     She was also seen and evaluated early this morning by hospital medicine physician, with plan for pain control and consideration for physical therapy.    I independently interpreted the CT of the lumbar spine, L1 compression fracture visualized.     I placed a call to Dr. Obrien, Spine surgeon on-call regarding acute L1 compression fracture.  His recommendation is for MRI, management will be conservative versus surgical depending on MRI results.  At this time, she continues to require aggressive pain management, and is not able to transfer from the bed without assistance.  Plan at this time is for admission to hospitalist service for follow-up of MRI results, definitive neurosurgical recommendations after imaging is complete, and further care as needed.    Discharge Exam:  /57   Pulse 60   Temp 36.7 °C (98.1 °F) (Temporal)   Resp 20   Ht 1.651  "m (5' 5\")   Wt 95.3 kg (210 lb)   LMP 06/04/2005 (LMP Unknown)   SpO2 94%   BMI 34.95 kg/m² .    Constitutional: Awake and alert. Afebrile.  HENT:  Atraumatic, normocephalic  Eyes: Normal conjunctiva, no scleral icterus, no periorbital edema  Neck: Normal and painless range of motion, supple  Cardiovascular: No tachycardia  Thorax & Lungs: Normal work of breathing, no tachypnea  Abdomen: Nondistended  Skin:  Warm, dry, intact  Musculoskeletal: She is having some difficulty getting up from the gurney due to back pain, bilateral upper and lower extremities without swelling or dislocation  Psychiatric: Calm and cooperative      Labs  All labs reviewed.    Radiology  CT-LSPINE W/O PLUS RECONS   Final Result      1.  Acute L1 compression fracture with minimal loss of height. There is questionable nondisplaced extension of the fracture into the T12 posterior elements.   2.  Multilevel degenerative changes.   3.  Degenerative grade 1 anterolisthesis of L4 on L5.   4.  Osteopenia.      CT-TSPINE W/O PLUS RECONS   Final Result      1.  L1 compression fracture with minimal loss of height. There is questionable extension into the T12 posterior elements.   2.  Osteopenia.   3.  S-shaped scoliosis of the thoracic spine.   4.  Small lytic lesion of the T12 vertebral body unchanged from 2012 CT, consistent with a benign etiology.   5.  Multilevel degenerative changes.      CT-CSPINE WITHOUT PLUS RECONS   Final Result      1.  No acute traumatic injury of the cervical spine.   2.  Minimal degenerative anterolisthesis of C4 on C5.   3.  Multilevel degenerative changes.   4.  1.6 cm right thyroid nodule. Recommend nonemergent dedicated thyroid ultrasound.      CT-HEAD W/O   Final Result      1.  No acute intracranial abnormality.   2.  Prominent bilateral basal ganglia calcifications.   3.  Diffuse paranasal sinus disease.         MR-LUMBAR SPINE-W/O    (Results Pending)       Medications:   New Prescriptions    CYCLOBENZAPRINE " (FLEXERIL) 10 MG TAB    Take 1 Tablet by mouth 3 times a day as needed for Muscle Spasms for up to 3 days.    HYDROCODONE-ACETAMINOPHEN (NORCO) 5-325 MG TAB PER TABLET    Take 1 Tablet by mouth every four hours as needed (pain) for up to 3 days.    IBUPROFEN (MOTRIN) 800 MG TAB    Take 1 Tablet by mouth every 8 hours as needed for Mild Pain for up to 3 days.    LIDOCAINE (LIDODERM) 5 % PATCH    Place 1 Patch on the skin every 24 hours for 5 days.       My final assessment includes: L1 Compression Fracture.    I have discussed management of the patient with the following physician:  Dr. Obrien (Spine surgeon on-call)  NICOLE LABOY  (Hospitalist).    Upon Reevaluation, the patient's condition has: not improved; and will be escalated to hospitalization.    Patient discharged from ED Observation status at 10:20 AM (Time) 07/08/2023 (Date).     Total time spent on this ED Observation discharge encounter is 35 minutes    Electronically signed by: Irais Perera M.D., 7/8/2023 10:06 AM

## 2023-07-08 NOTE — PROGRESS NOTES
Received pt via [employer], pt is AOX4 not in distress, on 2 lpm o2, complaining of pain on lower back joy with movement, oriented to room, call bell kept within reach,     Pt send to [facility] soon after receiving pt from ED, sent via gurney and escort

## 2023-07-08 NOTE — ED NOTES
Telephone report given to receiving RNEusebia. Patient to floor via transport. Patient in stable condition with no acute changes. Chart, medications, and all belongings with patient.

## 2023-07-08 NOTE — ED NOTES
Bedside report from CECI Reynolds. Pt roomed to green 26. Pt connected to monitors and call light within pt reach.

## 2023-07-08 NOTE — ASSESSMENT & PLAN NOTE
- Body mass index is 34.95 kg/m²..  -  on weight loss.  - outpatient referral for outpatient weight management.

## 2023-07-08 NOTE — ED NOTES
Assumed care of patient, bedside report received from off coming RN.  Introduced self as pt RN, POC discussed, call light in reach,  Fall risk interventions in place.

## 2023-07-08 NOTE — H&P
Hospital Medicine History & Physical Note    Date of Service  7/8/2023    Primary Care Physician  Yessy Paz M.D.    Consultants  neurosurgery    Specialist Names: Dr. Obrien    Code Status  Full Code    Chief Complaint  Chief Complaint   Patient presents with    Fall    Closed Head Injury           Low Back Pain       History of Presenting Illness  Lauren Esquivel is a 72 y.o. female with prediabetes, hypertension, hyperlipidemia, asthma, who was doing well until last night as she was cooking when the butter that she was cooking sparked, startling her, and causing her to fall backwards.  She struck her head and lower back on the floor.  She had immediate low back pain after the fall, and was a unable to get up because of the pain.  Otherwise she had no neurologic symptoms.  She denies any lower extremity numbness or weakness.  She had no urinary or bowel issues.  She denies any other complaint such as chest pain, shortness of breath, nausea, vomiting, abdominal pain, bowel movement changes.    ED course:  On evaluation, vital signs were stable.  Initial blood work-up were unremarkable with normal electrolytes and renal function.  CT of the back showed acute L1 compression fracture with minimal loss of height with questionable nondisplaced extension of the fractures into T12 components, with small lytic lesion of the T12 vertebral body unchanged from 2012 consistent with benign etiology.  Patient was given pain medications.  Neurosurgery was consulted who recommended obtaining an MRI.  Patient subsequently admitted to the hospitalist service.      I personally reviewed all lab results mentioned above. Prior medical records from this institution and outside facilities were independently reviewed as noted. I also personally reviewed all ER physician and consultant recommendations and plans as documented above. History was independently obtained by myself. I discussed the plan of care with patient, bedside RN,  and ERP .    Review of Systems  ROS    Pertinent positives/negatives as mentioned above.     A complete review of systems was personally done by me. All other systems were negative.       Past Medical History   has a past medical history of Anemia, Anxiety state, unspecified (1/3/2010), ASTHMA, Chronic rhinitis (1/3/2010), COPD, Elevated C-reactive protein (CRP) (2/6/2012), H/O pyelonephritis (8/21/2012), H/O: female infertility (8/10/2011), History of nephrolithiasis (8/21/2012), HTN, HTN (hypertension) (8/11/2010), Hyperglycemia (6/4/2012), Hyperlipidemia (8/28/2010), Influenza A with pneumonia (12/27/2016), Kidney filling defect (8/21/2012), Laryngeal spasm (1/3/2010), Metabolic syndrome (6/4/2012), Mycoplasma pneumonia (2003), Obesity (6/4/2012), Preventative health care (8/11/2010), Pyelonephritis (7/30/2012), Respiratory malfunction arising from mental factors (1/3/2010), Sleep apnea, Vitamin d deficiency (8/28/2010), and Wheeze (12/4/2009).    Surgical History   has a past surgical history that includes laparoscopy and tonsillectomy and adenoidectomy.     Family History  family history includes Heart Disease in her brother and father; Hypertension in her maternal grandfather and mother; Lung Disease in her mother; Stroke in her brother.     Social History   reports that she has never smoked. She has never used smokeless tobacco. She reports that she does not drink alcohol and does not use drugs.    Allergies  Allergies   Allergen Reactions    Ace Inhibitors      Cough    Ciprofloxacin     Levaquin [Levofloxacin]        Medications  Prior to Admission Medications   Prescriptions Last Dose Informant Patient Reported? Taking?   LISINOPRIL PO 7/7/2023 at [facility] Patient Yes Yes   Sig: Take 1 Tablet by mouth every day.   SYMBICORT 160-4.5 MCG/ACT Aerosol 7/7/2023 at [facility] Patient No No   Sig: INHALE 2 PUFFS 2 TIMES A DAY   albuterol (PROVENTIL) 2.5mg/3ml Nebu Soln solution for nebulization PRN at PRN  No No   Sig: 3 mL  by Nebulization route every 6 hours as needed for Shortness of Breath.   albuterol 108 (90 Base) MCG/ACT Aero Soln inhalation aerosol PRN at PRN  No No   Sig: Inhale 2 Puffs by mouth every 6 hours as needed for Shortness of Breath.   atorvastatin (LIPITOR) 20 MG Tab   No No   Sig: TAKE 1 TABLET BY MOUTH EVERYDAY AT BEDTIME   metFORMIN ER (GLUCOPHAGE XR) 500 MG TABLET SR 24 HR 7/7/2023 at [facility] Patient No No   Sig: Take 1 Tablet by mouth 2 times a day.   telmisartan (MICARDIS) 40 MG Tab   No No   Sig: TAKE 1 TABLET BY MOUTH EVERY DAY      Facility-Administered Medications: None       Physical Exam  Temp:  [36.7 °C (98.1 °F)] 36.7 °C (98.1 °F)  Pulse:  [56-78] 60  Resp:  [14-23] 20  BP: (108-155)/(55-81) 116/57  SpO2:  [89 %-95 %] 94 %  Blood Pressure : 116/57   Temperature: 36.7 °C (98.1 °F)   Pulse: 60   Respiration: 20   Pulse Oximetry: 94 %       Physical Exam  Vitals reviewed.   Constitutional:       General: She is not in acute distress.     Appearance: Normal appearance. She is normal weight. She is not ill-appearing or diaphoretic.   HENT:      Head: Normocephalic and atraumatic.      Right Ear: External ear normal.      Left Ear: External ear normal.      Mouth/Throat:      Mouth: Mucous membranes are moist.      Pharynx: No oropharyngeal exudate or posterior oropharyngeal erythema.   Eyes:      General: No scleral icterus.     Extraocular Movements: Extraocular movements intact.      Conjunctiva/sclera: Conjunctivae normal.      Pupils: Pupils are equal, round, and reactive to light.   Cardiovascular:      Rate and Rhythm: Normal rate and regular rhythm.      Heart sounds: Normal heart sounds. No murmur heard.  Pulmonary:      Effort: Pulmonary effort is normal. No respiratory distress.      Breath sounds: Normal breath sounds. No stridor. No wheezing, rhonchi or rales.   Chest:      Chest wall: No tenderness.   Abdominal:      General: Bowel sounds are normal. There is no distension.      Palpations: Abdomen  is soft. There is no mass.      Tenderness: There is no abdominal tenderness. There is no guarding or rebound.   Musculoskeletal:         General: Tenderness (midback) present. No swelling. Normal range of motion.      Cervical back: Normal range of motion and neck supple. No rigidity. No muscular tenderness.      Right lower leg: No edema.      Left lower leg: No edema.   Lymphadenopathy:      Cervical: No cervical adenopathy.   Skin:     General: Skin is warm and dry.      Coloration: Skin is not jaundiced.      Findings: No rash.   Neurological:      General: No focal deficit present.      Mental Status: She is alert and oriented to person, place, and time. Mental status is at baseline.      Cranial Nerves: No cranial nerve deficit.   Psychiatric:         Mood and Affect: Mood normal.         Behavior: Behavior normal.         Thought Content: Thought content normal.         Judgment: Judgment normal.         Laboratory:      Recent Labs     07/08/23  0241   SODIUM 138   POTASSIUM 4.0   CHLORIDE 105   CO2 21   GLUCOSE 107*   BUN 20   CREATININE 0.63   CALCIUM 9.3     Recent Labs     07/08/23  0241   GLUCOSE 107*         No results for input(s): NTPROBNP in the last 72 hours.      No results for input(s): TROPONINT in the last 72 hours.    Imaging:  CT-LSPINE W/O PLUS RECONS   Final Result      1.  Acute L1 compression fracture with minimal loss of height. There is questionable nondisplaced extension of the fracture into the T12 posterior elements.   2.  Multilevel degenerative changes.   3.  Degenerative grade 1 anterolisthesis of L4 on L5.   4.  Osteopenia.      CT-TSPINE W/O PLUS RECONS   Final Result      1.  L1 compression fracture with minimal loss of height. There is questionable extension into the T12 posterior elements.   2.  Osteopenia.   3.  S-shaped scoliosis of the thoracic spine.   4.  Small lytic lesion of the T12 vertebral body unchanged from 2012 CT, consistent with a benign etiology.   5.   Multilevel degenerative changes.      CT-CSPINE WITHOUT PLUS RECONS   Final Result      1.  No acute traumatic injury of the cervical spine.   2.  Minimal degenerative anterolisthesis of C4 on C5.   3.  Multilevel degenerative changes.   4.  1.6 cm right thyroid nodule. Recommend nonemergent dedicated thyroid ultrasound.      CT-HEAD W/O   Final Result      1.  No acute intracranial abnormality.   2.  Prominent bilateral basal ganglia calcifications.   3.  Diffuse paranasal sinus disease.         MR-LUMBAR SPINE-W/O    (Results Pending)         Imaging studies and EKG results were independently reviewed as above.      Assessment/Plan:  Justification for Admission Status  I anticipate this patient will require at least two midnights for appropriate medical management, necessitating inpatient admission because of L1 compression fracture requiring further work-up with MRI, and need to achieve pain control, along with PT/OT evaluation.  Medically necessary service cannot be provided at a lower level of care.      * Closed compression fracture of L1 lumbar vertebra, initial encounter (HCC)- (present on admission)  Assessment & Plan  - Due to fall.  CT showing acute L1 compression fracture with minimal loss of height, with questionable nondisplaced extension of the fracture into T12 components.  -Will admit to the medical floors due to need for further MRI, pain control, and PT/OT evaluation.  -I will obtain an MRI of the lumbar spine.  Neurosurgery also looking forward to MRI results.  -Pain control with oral oxycodone and IV Dilaudid.  I will start her on IV Toradol, and put her on Lidoderm patch.  I will also start her on calcitonin nasal spray to try to stabilize the fractures.  -PT/OT evaluation.    Asthma- (present on admission)  Assessment & Plan  - Not in acute exacerbation.  Resume ICS/LABA.  She will be on as needed bronchodilators per RT protocol.  Continue respiratory support with RT protocol, supplemental  oxygen as needed.    Morbid obesity (HCC)- (present on admission)  Assessment & Plan  - Body mass index is 34.95 kg/m²..  -  on weight loss.  - outpatient referral for outpatient weight management.    Prediabetes- (present on admission)  Assessment & Plan  - Last HbA1c is 5.9.  Hold metformin for now while in the hospital.  I will put her on sliding scale insulin coverage while in the hospital.  Diabetic diet.    Hyperlipidemia- (present on admission)  Assessment & Plan  - Resume home Lipitor.    HTN (hypertension)- (present on admission)  Assessment & Plan  - I will resume her home Micardis.  Start as needed IV labetalol for significant hypertension.  Monitor blood pressure trend closely, and optimize blood pressure control.        VTE prophylaxis: SCDs/TEDs

## 2023-07-08 NOTE — ASSESSMENT & PLAN NOTE
MRI reviewed with the patient  Neurosurgery recommending back brace  PT OT  Multimodal pain management

## 2023-07-08 NOTE — ED NOTES
Pt ambulated with SBA and walker to bathroom, gait steady, pt wearing her own shoes, denies dizziness.

## 2023-07-08 NOTE — ED NOTES
MRI screener completed. Pt assisted to standing with x1 assist. Amb to rr with FWW with SBA. PIV placed, repositioned in bed. Daughter at bedside, needs met.   [phone])

## 2023-07-08 NOTE — CONSULTS
Hospital Medicine Consultation    Date of Service  7/8/2023    Referring Physician  Jovanny Lawson    Consulting Physician  Pool Kirkland M.D.    Reason for Consultation  Fall, etiologies    History of Presenting Illness  72 y.o. female who presented 7/7/2023 with ground-level fall.  Is a pleasant 72-year-old woman who came in as a TBI alert after a mechanical ground-level fall.  She had pain in her lower back.  CT scan showed compression fracture of L1 with minimal loss of height of the vertebral body.  She required continued doses of pain meds.  May consider PT in the morning.  Currently patient reports the pain is improved and she is wanting to rest.     Review of Systems  Review of Systems   Constitutional:  Negative for chills and fever.   HENT:  Negative for sinus pain and sore throat.    Eyes:  Negative for blurred vision and double vision.   Respiratory:  Negative for cough.    Cardiovascular:  Negative for chest pain and PND.   Gastrointestinal:  Negative for abdominal pain, heartburn, nausea and vomiting.   Genitourinary:  Negative for dysuria and urgency.   Musculoskeletal:  Positive for back pain. Negative for myalgias.   Neurological:  Negative for dizziness and headaches.       Past Medical History   has a past medical history of Anemia, Anxiety state, unspecified (1/3/2010), ASTHMA, Chronic rhinitis (1/3/2010), COPD, Elevated C-reactive protein (CRP) (2/6/2012), H/O pyelonephritis (8/21/2012), H/O: female infertility (8/10/2011), History of nephrolithiasis (8/21/2012), HTN, HTN (hypertension) (8/11/2010), Hyperglycemia (6/4/2012), Hyperlipidemia (8/28/2010), Influenza A with pneumonia (12/27/2016), Kidney filling defect (8/21/2012), Laryngeal spasm (1/3/2010), Metabolic syndrome (6/4/2012), Mycoplasma pneumonia (2003), Obesity (6/4/2012), Preventative health care (8/11/2010), Pyelonephritis (7/30/2012), Respiratory malfunction arising from mental factors (1/3/2010), Sleep apnea, Vitamin d  deficiency (8/28/2010), and Wheeze (12/4/2009).    She has no past medical history of Encounter for long-term (current) use of other medications.    Surgical History   has a past surgical history that includes laparoscopy and tonsillectomy and adenoidectomy.    Family History  family history includes Heart Disease in her brother and father; Hypertension in her maternal grandfather and mother; Lung Disease in her mother; Stroke in her brother.    Social History   reports that she has never smoked. She has never used smokeless tobacco. She reports that she does not drink alcohol and does not use drugs.    Medications  Prior to Admission Medications   Prescriptions Last Dose Informant Patient Reported? Taking?   LISINOPRIL PO 7/7/2023 at [facility] Patient Yes Yes   Sig: Take 1 Tablet by mouth every day.   SYMBICORT 160-4.5 MCG/ACT Aerosol 7/7/2023 at [facility] Patient No No   Sig: INHALE 2 PUFFS 2 TIMES A DAY   albuterol (PROVENTIL) 2.5mg/3ml Nebu Soln solution for nebulization PRN at PRN  No No   Sig: 3 mL by Nebulization route every 6 hours as needed for Shortness of Breath.   albuterol 108 (90 Base) MCG/ACT Aero Soln inhalation aerosol PRN at PRN  No No   Sig: Inhale 2 Puffs by mouth every 6 hours as needed for Shortness of Breath.   atorvastatin (LIPITOR) 20 MG Tab   No No   Sig: TAKE 1 TABLET BY MOUTH EVERYDAY AT BEDTIME   metFORMIN ER (GLUCOPHAGE XR) 500 MG TABLET SR 24 HR 7/7/2023 at [facility] Patient No No   Sig: Take 1 Tablet by mouth 2 times a day.   telmisartan (MICARDIS) 40 MG Tab   No No   Sig: TAKE 1 TABLET BY MOUTH EVERY DAY      Facility-Administered Medications: None       Allergies  Allergies   Allergen Reactions    Ace Inhibitors      Cough    Ciprofloxacin     Levaquin [Levofloxacin]        Physical Exam  Temp:  [36.7 °C (98.1 °F)] 36.7 °C (98.1 °F)  Pulse:  [61-78] 61  Resp:  [14-23] 18  BP: (119-155)/(56-81) 119/56  SpO2:  [89 %-94 %] 94 %    Physical Exam  Constitutional:       General: She is not in acute  distress.     Appearance: She is not ill-appearing.   HENT:      Head: Normocephalic and atraumatic.      Nose: Nose normal.      Mouth/Throat:      Mouth: Mucous membranes are moist.      Pharynx: Oropharynx is clear.   Eyes:      Extraocular Movements: Extraocular movements intact.      Conjunctiva/sclera: Conjunctivae normal.   Cardiovascular:      Rate and Rhythm: Normal rate and regular rhythm.      Pulses: Normal pulses.      Heart sounds: Normal heart sounds.   Pulmonary:      Effort: Pulmonary effort is normal.      Breath sounds: Normal breath sounds.   Abdominal:      General: Abdomen is flat.      Palpations: Abdomen is soft.   Musculoskeletal:         General: No swelling or tenderness. Normal range of motion.      Cervical back: Normal range of motion. No rigidity or tenderness.   Skin:     General: Skin is warm and dry.      Capillary Refill: Capillary refill takes less than 2 seconds.   Neurological:      General: No focal deficit present.      Mental Status: She is oriented to person, place, and time.         Fluids      Laboratory      Recent Labs     07/08/23  0241   SODIUM 138   POTASSIUM 4.0   CHLORIDE 105   CO2 21   GLUCOSE 107*   BUN 20   CREATININE 0.63   CALCIUM 9.3                     Imaging  CT-LSPINE W/O PLUS RECONS   Final Result      1.  Acute L1 compression fracture with minimal loss of height. There is questionable nondisplaced extension of the fracture into the T12 posterior elements.   2.  Multilevel degenerative changes.   3.  Degenerative grade 1 anterolisthesis of L4 on L5.   4.  Osteopenia.      CT-TSPINE W/O PLUS RECONS   Final Result      1.  L1 compression fracture with minimal loss of height. There is questionable extension into the T12 posterior elements.   2.  Osteopenia.   3.  S-shaped scoliosis of the thoracic spine.   4.  Small lytic lesion of the T12 vertebral body unchanged from 2012 CT, consistent with a benign etiology.   5.  Multilevel degenerative changes.       CT-CSPINE WITHOUT PLUS RECONS   Final Result      1.  No acute traumatic injury of the cervical spine.   2.  Minimal degenerative anterolisthesis of C4 on C5.   3.  Multilevel degenerative changes.   4.  1.6 cm right thyroid nodule. Recommend nonemergent dedicated thyroid ultrasound.      CT-HEAD W/O   Final Result      1.  No acute intracranial abnormality.   2.  Prominent bilateral basal ganglia calcifications.   3.  Diffuse paranasal sinus disease.             Assessment/Plan  Low back pain  Assessment & Plan  Following fall, L1 compression fracture  Consider PT in morning  Pain control    Hyperlipidemia- (present on admission)  Assessment & Plan  Continue statin    HTN (hypertension)- (present on admission)  Assessment & Plan  Continue current medications

## 2023-07-08 NOTE — THERAPY
Physical Therapy Contact Note    Patient Name: Lauren Esquivel  Age:  72 y.o., Sex:  female  Medical Record #: [id number]  Today's Date: 7/8/2023    Consulted in ER, mary barba, pt being admitting; will follow up when able     Yanni MARES, PT,  [phone]

## 2023-07-08 NOTE — ED NOTES
Med Rec complete per patient/home pharmacy  Patient reports she told [employer] that she is on Lisinopril, but that is her husbands medication. Per pharmacy and patient she is on Telmisartan.

## 2023-07-08 NOTE — ED TRIAGE NOTES
Lauren Esquivel  72 y.o. female    Chief Complaint   Patient presents with    Fall    Closed Head Injury           Low Back Pain       Pt arrives as TBI after falling backwards when butter exploded on stove. Pt states she hit her head but did not lose consciousness.     Denies blood thinners. A&Ox4. Took tylenol PTA.

## 2023-07-09 VITALS
DIASTOLIC BLOOD PRESSURE: 86 MMHG | HEART RATE: 77 BPM | HEIGHT: 65 IN | WEIGHT: 209.92 LBS | BODY MASS INDEX: 34.98 KG/M2 | TEMPERATURE: 98.1 F | OXYGEN SATURATION: 90 % | RESPIRATION RATE: 18 BRPM | SYSTOLIC BLOOD PRESSURE: 131 MMHG

## 2023-07-09 LAB
ANION GAP SERPL CALC-SCNC: 12 MMOL/L (ref 7–16)
BASOPHILS # BLD AUTO: 0.7 % (ref 0–1.8)
BASOPHILS # BLD: 0.06 K/UL (ref 0–0.12)
BUN SERPL-MCNC: 17 MG/DL (ref 8–22)
CALCIUM SERPL-MCNC: 9.3 MG/DL (ref 8.5–10.5)
CHLORIDE SERPL-SCNC: 103 MMOL/L (ref 96–112)
CO2 SERPL-SCNC: 22 MMOL/L (ref 20–33)
CREAT SERPL-MCNC: 0.72 MG/DL (ref 0.5–1.4)
EOSINOPHIL # BLD AUTO: 0.42 K/UL (ref 0–0.51)
EOSINOPHIL NFR BLD: 5 % (ref 0–6.9)
ERYTHROCYTE [DISTWIDTH] IN BLOOD BY AUTOMATED COUNT: 44.1 FL (ref 35.9–50)
GFR SERPLBLD CREATININE-BSD FMLA CKD-EPI: 88 ML/MIN/1.73 M 2
GLUCOSE BLD STRIP.AUTO-MCNC: 108 MG/DL (ref 65–99)
GLUCOSE BLD STRIP.AUTO-MCNC: 111 MG/DL (ref 65–99)
GLUCOSE BLD STRIP.AUTO-MCNC: 79 MG/DL (ref 65–99)
GLUCOSE SERPL-MCNC: 112 MG/DL (ref 65–99)
HCT VFR BLD AUTO: 42.2 % (ref 37–47)
HGB BLD-MCNC: 13.6 G/DL (ref 12–16)
IMM GRANULOCYTES # BLD AUTO: 0.04 K/UL (ref 0–0.11)
IMM GRANULOCYTES NFR BLD AUTO: 0.5 % (ref 0–0.9)
LYMPHOCYTES # BLD AUTO: 1.62 K/UL (ref 1–4.8)
LYMPHOCYTES NFR BLD: 19.3 % (ref 22–41)
MCH RBC QN AUTO: 28.9 PG (ref 27–33)
MCHC RBC AUTO-ENTMCNC: 32.2 G/DL (ref 32.2–35.5)
MCV RBC AUTO: 89.6 FL (ref 81.4–97.8)
MONOCYTES # BLD AUTO: 0.72 K/UL (ref 0–0.85)
MONOCYTES NFR BLD AUTO: 8.6 % (ref 0–13.4)
NEUTROPHILS # BLD AUTO: 5.52 K/UL (ref 1.82–7.42)
NEUTROPHILS NFR BLD: 65.9 % (ref 44–72)
NRBC # BLD AUTO: 0 K/UL
NRBC BLD-RTO: 0 /100 WBC (ref 0–0.2)
PLATELET # BLD AUTO: 265 K/UL (ref 164–446)
PMV BLD AUTO: 10.2 FL (ref 9–12.9)
POTASSIUM SERPL-SCNC: 3.9 MMOL/L (ref 3.6–5.5)
RBC # BLD AUTO: 4.71 M/UL (ref 4.2–5.4)
SODIUM SERPL-SCNC: 137 MMOL/L (ref 135–145)
WBC # BLD AUTO: 8.4 K/UL (ref 4.8–10.8)

## 2023-07-09 PROCEDURE — 700101 HCHG RX REV CODE 250: Performed by: INTERNAL MEDICINE

## 2023-07-09 PROCEDURE — 85025 COMPLETE CBC W/AUTO DIFF WBC: CPT

## 2023-07-09 PROCEDURE — A9270 NON-COVERED ITEM OR SERVICE: HCPCS | Performed by: INTERNAL MEDICINE

## 2023-07-09 PROCEDURE — A9270 NON-COVERED ITEM OR SERVICE: HCPCS | Performed by: STUDENT IN AN ORGANIZED HEALTH CARE EDUCATION/TRAINING PROGRAM

## 2023-07-09 PROCEDURE — 700111 HCHG RX REV CODE 636 W/ 250 OVERRIDE (IP): Performed by: INTERNAL MEDICINE

## 2023-07-09 PROCEDURE — 82962 GLUCOSE BLOOD TEST: CPT | Mod: 91

## 2023-07-09 PROCEDURE — 99232 SBSQ HOSP IP/OBS MODERATE 35: CPT | Performed by: INTERNAL MEDICINE

## 2023-07-09 PROCEDURE — 700102 HCHG RX REV CODE 250 W/ 637 OVERRIDE(OP): Performed by: INTERNAL MEDICINE

## 2023-07-09 PROCEDURE — 770006 HCHG ROOM/CARE - MED/SURG/GYN SEMI*

## 2023-07-09 PROCEDURE — 700102 HCHG RX REV CODE 250 W/ 637 OVERRIDE(OP): Performed by: STUDENT IN AN ORGANIZED HEALTH CARE EDUCATION/TRAINING PROGRAM

## 2023-07-09 PROCEDURE — 80048 BASIC METABOLIC PNL TOTAL CA: CPT

## 2023-07-09 RX ADMIN — SENNOSIDES AND DOCUSATE SODIUM 2 TABLET: 50; 8.6 TABLET ORAL at 05:53

## 2023-07-09 RX ADMIN — LIDOCAINE 1 PATCH: 50 PATCH TOPICAL at 10:20

## 2023-07-09 RX ADMIN — ATORVASTATIN CALCIUM 20 MG: 20 TABLET, FILM COATED ORAL at 20:32

## 2023-07-09 RX ADMIN — MOMETASONE FUROATE AND FORMOTEROL FUMARATE DIHYDRATE 2 PUFF: 200; 5 AEROSOL RESPIRATORY (INHALATION) at 08:01

## 2023-07-09 RX ADMIN — KETOROLAC TROMETHAMINE 15 MG: 30 INJECTION, SOLUTION INTRAMUSCULAR; INTRAVENOUS at 01:02

## 2023-07-09 RX ADMIN — CALCITONIN SALMON 200 UNITS: 200 SPRAY, METERED NASAL at 05:54

## 2023-07-09 RX ADMIN — OXYCODONE HYDROCHLORIDE 5 MG: 5 TABLET ORAL at 10:59

## 2023-07-09 RX ADMIN — MOMETASONE FUROATE AND FORMOTEROL FUMARATE DIHYDRATE 2 PUFF: 200; 5 AEROSOL RESPIRATORY (INHALATION) at 20:38

## 2023-07-09 RX ADMIN — TELMISARTAN 40 MG: 40 TABLET ORAL at 05:53

## 2023-07-09 RX ADMIN — SENNOSIDES AND DOCUSATE SODIUM 2 TABLET: 50; 8.6 TABLET ORAL at 20:31

## 2023-07-09 RX ADMIN — ACETAMINOPHEN 650 MG: 325 TABLET, FILM COATED ORAL at 20:33

## 2023-07-09 RX ADMIN — MAGNESIUM HYDROXIDE 30 ML: 1200 LIQUID ORAL at 17:02

## 2023-07-09 ASSESSMENT — PATIENT HEALTH QUESTIONNAIRE - PHQ9
SUM OF ALL RESPONSES TO PHQ9 QUESTIONS 1 AND 2: 0
1. LITTLE INTEREST OR PLEASURE IN DOING THINGS: NOT AT ALL
2. FEELING DOWN, DEPRESSED, IRRITABLE, OR HOPELESS: NOT AT ALL

## 2023-07-09 ASSESSMENT — PAIN DESCRIPTION - PAIN TYPE
TYPE: ACUTE PAIN

## 2023-07-09 ASSESSMENT — PAIN SCALES - PAIN ASSESSMENT IN ADVANCED DEMENTIA (PAINAD): BREATHING: NORMAL

## 2023-07-09 ASSESSMENT — ENCOUNTER SYMPTOMS
BACK PAIN: 1
ABDOMINAL PAIN: 0
WEAKNESS: 1
VOMITING: 0
SHORTNESS OF BREATH: 0

## 2023-07-09 NOTE — PROGRESS NOTES
Go home and rest.  Focus on staying hydrated.      You can take roxicodone for pain if needed.     Please follow up with your primary care doctor early this week for recheck.  Return if worsening.  Your liver tests are elevated and will need to be rechecked in the future.  Avoid tylenol since this is metabolized by the liver.         Mononucleosis  Mononucleosis (also called mono) is a contagious viral infection. Most infants and children exposed to the virus get only mild flu-like symptoms or no symptoms at all. However, infection is usually more serious in teens and young adults. While the virus is active it causes symptoms and can spread to others. After symptoms subside, the virus stays in the body and eventually becomes inactive. Once you have one case of mono, you are unlikely to develop symptoms again.  The virus is usually spread by contact with saliva, often by kissing. It may also spread by breastmilk, blood, or sexual contact. It takes about 4 to 6 weeks to develop symptoms after exposure.  Early symptoms include headache, nausea, tiredness and general muscle aching. This is followed by sore throat and fever. Lymph glands in the neck, under the arms, or in the groin may be swollen. Symptoms usually go away in about 1 to 2 months. But they can last up to four months.  If symptoms have been present less than 1 week or more than 3 weeks, the blood test used to diagnose this disease may be negative even though you have the illness.  In this case, other tests may be done.  Taking the antibiotics ampicillin or amoxicillin during a mono infection may cause a skin rash. This is not serious and will fade in about one week. The cause is a reaction of the drug with the virus.  Mono can cause your spleen to swell. The spleen is a fist-sized organ in the upper left abdomen that stores red blood cells. Injury to a swollen spleen can cause the spleen to rupture. This can cause life-threatening internal bleeding. To  Xlarge TLSO with 3 inch extenders has been sized and placed at [facility] bedside   avoid this, do not play contact sports or perform strenuous activity for 8 weeks, or until cleared by your healthcare provider. A sharp blow could rupture a swollen spleen  Home care    Rest in bed until the fever and weakness have gone away.    Drink plenty of fluids, but avoid alcohol. Otherwise, you may eat a regular diet.    Ask your healthcare provider about using over-the-counter medicines to treat symptoms such as fever, pain, or an itchy rash.    Over-the-counter throat lozenges may help soothe a sore throat. Gargling with warm salt water (1/2 teaspoon in 1 glass of warm water) may also be soothing to the throat.    You may return to work or school after the fever goes away and you are feeling better. Continue to follow any activity restrictions you have been given.  Preventing spread of the virus  To limit the spread of the virus, avoid exposing others to your saliva for at least 6 months after your illness (no kissing or sharing utensils, drinking glasses, or toothbrushes).  Follow-up care  Follow up with your healthcare provider within 1 to 2 weeks or as advised by our staff to be sure that there are no complications. If symptoms of extreme fatigue and swollen glands last longer than 6 months, see your healthcare provider for further testing.  When to seek medical advice  Call your healthcare provider right away if any of the following occur:    Excessive coughing    Yellow skin or eyes    Trouble swallowing  Call 911  Call 911 if any of the following occur:    Severe or worsening abdominal pain    Trouble breathing  Date Last Reviewed: 9/25/2015 2000-2017 [employer]. [address] [place] [address], [place] [address]. All rights reserved. This information is not intended as a substitute for professional medical care. Always follow your healthcare professional's instructions.

## 2023-07-09 NOTE — CARE PLAN
The patient is Stable - Low risk of patient condition declining or worsening    Shift Goals  Clinical Goals:  (pain at or below patients comfort level)  Patient Goals:  (pain management)    Progress made toward(s) clinical / shift goals:    Problem: Pain - Standard  Goal: Alleviation of pain or a reduction in pain to the patient’s comfort goal  Outcome: Progressing  Flowsheets (Taken 7/9/2023 4134)  OB Pain Intervention:   Rest   Repositioned  Pain Rating Scale (NPRS): 3     Problem: Knowledge Deficit - Standard  Goal: Patient and family/care givers will demonstrate understanding of plan of care, disease process/condition, diagnostic tests and medications  Outcome: Progressing  Note: MD will give patient the results of MRI     Problem: Fall Risk  Goal: Patient will remain free from falls  Outcome: Progressing  Note: Patient will use front wheeled walker and standby assistance       Patient is not progressing towards the following goals:

## 2023-07-09 NOTE — CARE PLAN
The patient is Stable - Low risk of patient condition declining or worsening    Shift Goals  Clinical Goals: Pain management  Patient Goals: Comfort    Progress made toward(s) clinical / shift goals:    Problem: Pain - Standard  Goal: Alleviation of pain or a reduction in pain to the patient’s comfort goal 4          Patient will state pain 4/10 by the end of the shift.  Outcome: Progressing     Problem: Knowledge Deficit - Standard  Goal: Patient and family/care givers will demonstrate understanding of plan of care, disease process/condition, diagnostic tests and medications  Outcome: Progressing       Patient is not progressing towards the following goals:

## 2023-07-09 NOTE — CARE PLAN
The patient is Stable - Low risk of patient condition declining or worsening    Shift Goals  Clinical Goals: pain reduced to  to comfort level  Patient Goals: pain control    Progress made toward(s) clinical / shift goals:  pain well controlled by prn pain meds , patient was able to ambulate to rest room       Problem: Knowledge Deficit - Standard  Goal: Patient and family/care givers will demonstrate understanding of plan of care, disease process/condition, diagnostic tests and medications  Outcome: Progressing     Patient is not progressing towards the following goals:

## 2023-07-09 NOTE — PROGRESS NOTES
Hospital Medicine Daily Progress Note    Date of Service  7/9/2023    Chief Complaint  Lauren Esquivel is a 72 y.o. female admitted 7/7/2023 with back pain    Hospital Course  71 yo woman with HTN, HLD, asthma who presented to the ER with fall onto her back.  CT showed acute L1 compression fracture with minimal loss of height with questionable nondisplaced extension of the fractures into T12, small lytic lesion of T12 vertebral body unchanged from 2012 consistent with benign etiology.  Dr. Obiren with neurosurgery was consulted and recommended MRI.    Interval Problem Update  She continues to have intermittent back pain.  Neurosurgery is recommending back brace and physical therapy.  Physical therapy to evaluate    I have discussed this patient's plan of care and discharge plan at IDT rounds today with Case Management, Nursing, Nursing leadership, and other members of the IDT team.    Consultants/Specialty  neurosurgery    Code Status  Full Code    Disposition  The patient is not medically cleared for discharge to home or a post-acute facility.  Anticipate discharge to: home with organized home healthcare and close outpatient follow-up    I have placed the appropriate orders for post-discharge needs.    Review of Systems  Review of Systems   Constitutional:  Positive for malaise/fatigue.   Respiratory:  Negative for shortness of breath.    Cardiovascular:  Negative for leg swelling.   Gastrointestinal:  Negative for abdominal pain and vomiting.   Musculoskeletal:  Positive for back pain.   Neurological:  Positive for weakness.        Physical Exam  Temp:  [36.2 °C (97.2 °F)-36.4 °C (97.5 °F)] 36.2 °C (97.2 °F)  Pulse:  [55-68] 64  Resp:  [17-18] 17  BP: (104-124)/(43-57) 120/57  SpO2:  [94 %-95 %] 95 %    Physical Exam  Vitals and nursing note reviewed.   Constitutional:       General: She is not in acute distress.     Appearance: She is not toxic-appearing.   HENT:      Head: Normocephalic.      Mouth/Throat:       Mouth: Mucous membranes are moist.   Eyes:      General:         Right eye: No discharge.         Left eye: No discharge.   Cardiovascular:      Rate and Rhythm: Normal rate and regular rhythm.   Pulmonary:      Effort: Pulmonary effort is normal. No respiratory distress.      Breath sounds: No wheezing or rales.   Abdominal:      Palpations: Abdomen is soft.      Tenderness: There is no abdominal tenderness.   Musculoskeletal:         General: No swelling.      Cervical back: Neck supple.      Comments: Hip flexion bilaterally limited by low back pain   Skin:     General: Skin is warm and dry.   Neurological:      Mental Status: She is alert and oriented to person, place, and time.         Fluids    Intake/Output Summary (Last 24 hours) at 7/9/2023 1443  Last data filed at 7/9/2023 1100  Gross per 24 hour   Intake 600 ml   Output --   Net 600 ml       Laboratory  Recent Labs     07/09/23  0045   WBC 8.4   RBC 4.71   HEMOGLOBIN 13.6   HEMATOCRIT 42.2   MCV 89.6   MCH 28.9   MCHC 32.2   RDW 44.1   PLATELETCT 265   MPV 10.2     Recent Labs     07/08/23  0241 07/09/23  0045   SODIUM 138 137   POTASSIUM 4.0 3.9   CHLORIDE 105 103   CO2 21 22   GLUCOSE 107* 112*   BUN 20 17   CREATININE 0.63 0.72   CALCIUM 9.3 9.3                   Imaging  MR-LUMBAR SPINE-W/O   Final Result      1.  L4-5 slight grade 1 spondylolisthesis. This is associated with moderate hypertrophic facet arthropathy with prominent synovial fluid.   2.  L1 vertebral body mild superior endplate compression fracture with prominent marrow edema signal consistent with acute or recent subacute insufficiency compression fracture. This would be amenable to vertebral augmentation if indicated for pain    control.   3.  Minor degenerative changes as outlined above for each level in the body of report. No central stenosis or significant foraminal stenosis at any lumbar level.      CT-LSPINE W/O PLUS RECONS   Final Result      1.  Acute L1 compression  fracture with minimal loss of height. There is questionable nondisplaced extension of the fracture into the T12 posterior elements.   2.  Multilevel degenerative changes.   3.  Degenerative grade 1 anterolisthesis of L4 on L5.   4.  Osteopenia.      CT-TSPINE W/O PLUS RECONS   Final Result      1.  L1 compression fracture with minimal loss of height. There is questionable extension into the T12 posterior elements.   2.  Osteopenia.   3.  S-shaped scoliosis of the thoracic spine.   4.  Small lytic lesion of the T12 vertebral body unchanged from 2012 CT, consistent with a benign etiology.   5.  Multilevel degenerative changes.      CT-CSPINE WITHOUT PLUS RECONS   Final Result      1.  No acute traumatic injury of the cervical spine.   2.  Minimal degenerative anterolisthesis of C4 on C5.   3.  Multilevel degenerative changes.   4.  1.6 cm right thyroid nodule. Recommend nonemergent dedicated thyroid ultrasound.      CT-HEAD W/O   Final Result      1.  No acute intracranial abnormality.   2.  Prominent bilateral basal ganglia calcifications.   3.  Diffuse paranasal sinus disease.              Assessment/Plan  * Closed compression fracture of L1 lumbar vertebra, initial encounter (HCC)- (present on admission)  Assessment & Plan  MRI reviewed with the patient  Neurosurgery recommending back brace  PT OT  Multimodal pain management    Asthma- (present on admission)  Assessment & Plan  No wheezing present, continue Dulera    Morbid obesity (HCC)- (present on admission)  Assessment & Plan  - Body mass index is 34.95 kg/m²..  -  on weight loss.  - outpatient referral for outpatient weight management.    Prediabetes- (present on admission)  Assessment & Plan  - Last HbA1c is 5.9.  Hold metformin for now while in the hospital.  ISS    Hyperlipidemia- (present on admission)  Assessment & Plan  Continue Lipitor    HTN (hypertension)- (present on admission)  Assessment & Plan  Continue Micardis  Blood pressure is  stable         VTE prophylaxis: SCDs/TEDs    I have performed a physical exam and reviewed and updated ROS and Plan today (7/9/2023). In review of yesterday's note (7/8/2023), there are no changes except as documented above.

## 2023-07-09 NOTE — RESPIRATORY CARE
"  COPD EDUCATION by COPD CLINICAL EDUCATOR  7/9/2023  at  2:13 PM by Nigel John, RRT     Patient unavailable at this time, will try to revisit later.    COPD Screen  COPD Risk Screening  Do you have a history of COPD?: Yes  Do you have a Pulmonologist?: No  COPD Population Screener  During the past 4 weeks, how much did you feel short of breath?: Some of the time  Do you ever cough up any mucus or phlegm?: No/only with occasional colds or infections  In the past 12 months, you do less than you used to because of your breathing problems: Disagree/unsure  Have you smoked at least 100 cigarettes in your entire life?: No/don't know  How old are you?: 60+  COPD Screening Score: 3  COPD Coordinator Not Recommended: Yes    COPD Assessment  COPD Clinical Specialists ONLY  COPD Education Initiated: Yes--Short Intervention (Patient unavailable at this time, will try to revisit later.)  Interdisciplinary Rounds: Attendance at Rounds (30 Min)    PFT Results    No results found for: PFT    Meds to Beds  Would the patient like to opt in for Bedside Medication Delivery at Discharge?: Yes, interested     MY COPD ACTION PLAN     It is recommended that patients and physicians /healthcare providers complete this action plan together. This plan should be discussed at each physician visit and updated as needed.    The green, yellow and red zones show groups of symptoms of COPD. This list of symptoms is not comprehensive, and you may experience other symptoms. In the \"Actions\" column, your healthcare provider has recommended actions for you to take based on your symptoms.    Patient Name: Lauren Esquivel   YOB: 1950   Last Updated on:     Green Zone:  I am doing well today Actions     Usual activitiy and exercise level   Take daily medications     Usual amounts of cough and phlegm/mucus   Use oxygen as prescribed     Sleep well at night   Continue regular exercise/diet plan     Appetite is good   At all times " "avoid cigarette smoke, inhaled irritants     Daily Medications (these medications are taken every day):                Yellow Zone:  I am having a bad day or a COPD flare Actions     More breathless than usual   Continue daily medications     I have less energy for my daily activities   Use quick relief inhaler as ordered     Increased or thicker phlegm/mucus   Use oxygen as prescribed     Using quick relief inhaler/nebulizer more often   Get plenty of rest     Swelling of ankles more than usual   Use pursed lip breathing     More coughing than usual   At all times avoid cigarette smoke, inhaled irritants     I feel like I have a \"chest cold\"     Poor sleep and my symptoms woke me up     My appetite is not good     My medicine is not helping      Call provider immediately if symptoms don’t improve     Continue daily medications, add rescue medications:               Medications to be used during a flare up, (as Discussed with Provider):              Red Zone:  I need urgent medical care Actions     Severe shortness of breath even at rest   Call 911 or seek medical care immediately     Not able to do any activity because of breathing      Fever or shaking chills      Feeling confused or very drowsy       Chest pains      Coughing up blood                  "

## 2023-07-09 NOTE — CONSULTS
ID:  Lauren Esquivel; 72 y.o. female      Admission Date: 7/7/2023   Date of Consultation: 7/9/2023  Requesting Provider: Cristian Klein M.D.  PCP: Yessy Paz M.D.        Chief Complaint:: ground level fall    Reason for consultation:: L1 compression fracture      HPI:  Lauren Esquivel is a 72 y.o. female who presented to [facility] [place] after ground level fall. Yesterday, patient was cooking and was startled causing her to fall backwards onto her back. She landed on stone joselito. -LOC. PT c/o back pain. Imaging shows L1 compression fracture. Denies motor weakness, sensory changes, or bowel/bladder issues.        Past Medical History:  Past Medical History:   Diagnosis Date    Anemia     Anxiety state, unspecified 1/3/2010    ASTHMA     Chronic rhinitis 1/3/2010    COPD     Elevated C-reactive protein (CRP) 2/6/2012    H/O pyelonephritis 8/21/2012    H/O: female infertility 8/10/2011    History of nephrolithiasis 8/21/2012    HTN     HTN (hypertension) 8/11/2010    Hyperglycemia 6/4/2012    Hyperlipidemia 8/28/2010    Influenza A with pneumonia 12/27/2016    [facility]'s admission 2016.     Kidney filling defect 8/21/2012    Laryngeal spasm 1/3/2010    Metabolic syndrome 6/4/2012    Mycoplasma pneumonia 2003    Obesity 6/4/2012    Preventative health care 8/11/2010    Pyelonephritis 7/30/2012    Respiratory malfunction arising from mental factors 1/3/2010    Sleep apnea     Vitamin d deficiency 8/28/2010    Wheeze 12/4/2009       Past Surgical History:  Past Surgical History:   Procedure Laterality Date    LAPAROSCOPY      for infertility    TONSILLECTOMY AND ADENOIDECTOMY         Social History:  Social History     Occupational History     Employer: [email or web address]   Tobacco Use    Smoking status: Never    Smokeless tobacco: Never   Vaping Use    Vaping Use: Never used   Substance and Sexual Activity    Alcohol use: No     Alcohol/week: 0.0 oz    Drug use: No    Sexual activity: Yes      Partners: Male     Social History     Social History Narrative    Not on file       Family History:  Family History   Problem Relation Age of Onset    Lung Disease Mother     Hypertension Mother     Heart Disease Father     Hypertension Maternal Grandfather     Stroke Brother     Heart Disease Brother        Medications:  Prior to Admission medications    Medication Sig Start Date End Date Taking? Authorizing Provider   ibuprofen (MOTRIN) 800 MG Tab Take 1 Tablet by mouth every 8 hours as needed for Mild Pain for up to 3 days. 7/8/23 7/11/23 Yes Jovanny Lawson   cyclobenzaprine (FLEXERIL) 10 mg Tab Take 1 Tablet by mouth 3 times a day as needed for Muscle Spasms for up to 3 days. 7/8/23 7/11/23 Yes Jovanny Lawson   lidocaine (LIDODERM) 5 % Patch Place 1 Patch on the skin every 24 hours for 5 days. 7/8/23 7/13/23 Yes Jovanny Lawson   HYDROcodone-acetaminophen (NORCO) 5-325 MG Tab per tablet Take 1 Tablet by mouth every four hours as needed (pain) for up to 3 days. 7/8/23 7/11/23 Yes Jovanny Lawson   SYMBICORT 160-4.5 MCG/ACT Aerosol INHALE 2 PUFFS 2 TIMES A DAY  Patient taking differently: Inhale 2 Puffs 2 times a day. 1/26/23   Yessy Paz M.D.   telmisartan (MICARDIS) 40 MG Tab TAKE 1 TABLET BY MOUTH EVERY DAY  Patient taking differently: Take 40 mg by mouth every day. 12/21/22   Yessy Paz M.D.   metFORMIN ER (GLUCOPHAGE XR) 500 MG TABLET SR 24 HR Take 1 Tablet by mouth 2 times a day. 7/19/22   Yessy Paz M.D.   albuterol (PROVENTIL) 2.5mg/3ml Nebu Soln solution for nebulization 3 mL by Nebulization route every 6 hours as needed for Shortness of Breath. 3/3/20   Yessy Paz M.D.   albuterol 108 (90 Base) MCG/ACT Aero Soln inhalation aerosol Inhale 2 Puffs by mouth every 6 hours as needed for Shortness of Breath. 9/18/18   Yessy Paz M.D.       Allergies:  Allergies   Allergen Reactions    Ace Inhibitors      Cough    Ciprofloxacin     Levaquin [Levofloxacin]        Review of Systems:     negative for constitutional, HEENT, cardiac, pulmonary, GI, , musculoskeletal, psych, dermatologic, endo, hematologic, immunologic except: as reviewed in HPI        PHYSICAL  EXAMINATION:   A/O x 4, NAD, normal affect  Non-labored respiration, symmetrical chest rise  CN 2-12 grossly intact    TTP along midline lumbar spine    Motor Exam (0-5/5, N/T)  STRENGTH R L   Deltoid  5 5   Biceps 5 5   Triceps 5 5   Wrist Dorsiflexors 5 5   Finger Abductor 5 5    5 5   Iliopsoas 5 5   Quadriceps 5 5   Hamstrings 5 5   Ankle dorsiflexor 5 5   Ankle plantarflexor 5 5   Extensor hallucis 5 5       Dermatomal Deficit: Sensation intact throughout all dermatomes     REFLEXES R L   Biceps nt nt   Brachiorad. nt nt   Triceps nt nt   Patella 1 1   Ankle 1 1     Anal tone and sensation intact      No clonus  Babinski downgoing    Muscle appearance: Symmetric and normal appearing bulk, contour and tone.      LABS:  Recent Labs     07/08/23  0241 07/09/23  0045   SODIUM 138 137   POTASSIUM 4.0 3.9   CHLORIDE 105 103   CO2 21 22   GLUCOSE 107* 112*   BUN 20 17   CREATININE 0.63 0.72   CALCIUM 9.3 9.3     Recent Labs     07/09/23  0045   WBC 8.4   RBC 4.71   HEMOGLOBIN 13.6   HEMATOCRIT 42.2   MCV 89.6   MCH 28.9   MCHC 32.2   RDW 44.1   PLATELETCT 265   MPV 10.2     No results found for: PROTHROMBTM, INR         Radiology Studies:     CT lumbar spine shows:  1.  Acute L1 compression fracture with minimal loss of height. There is questionable nondisplaced extension of the fracture into the T12 posterior elements.  2.  Multilevel degenerative changes.  3.  Degenerative grade 1 anterolisthesis of L4 on L5.  4.  Osteopenia.    Lumbar MRI shows:  1.  L4-5 slight grade 1 spondylolisthesis. This is associated with moderate hypertrophic facet arthropathy with prominent synovial fluid.  2.  L1 vertebral body mild superior endplate compression fracture with prominent marrow edema signal consistent with acute or recent subacute insufficiency  compression fracture. This would be amenable to vertebral augmentation if indicated for pain   control.  3.  Minor degenerative changes as outlined above for each level in the body of report. No central stenosis or significant foraminal stenosis at any lumbar level.    Impression and Plan:     Ms. Lauren Esquivel is a 72 y.o. year old female presenting with mild L1 compression fracture after a GLF.     - TLSO when out of bed  - upright Xrays in brace  - pain control  - PT  - consider kyphoplasty if fails brace management. I do not recommend it at this point    Thank you for the consultation. Please do not hesitate contacting me with questions.    Louie Obrien III, MD, PhD  Board eligible neurosurgeon  [facility]

## 2023-07-10 ENCOUNTER — APPOINTMENT (OUTPATIENT)
Dept: RADIOLOGY | Facility: MEDICAL CENTER | Age: 73
DRG: 552 | End: 2023-07-10
Attending: PHYSICIAN ASSISTANT
Payer: MEDICARE

## 2023-07-10 ENCOUNTER — PHARMACY VISIT (OUTPATIENT)
Dept: PHARMACY | Facility: MEDICAL CENTER | Age: 73
End: 2023-07-10
Payer: COMMERCIAL

## 2023-07-10 LAB
GLUCOSE BLD STRIP.AUTO-MCNC: 116 MG/DL (ref 65–99)
GLUCOSE BLD STRIP.AUTO-MCNC: 131 MG/DL (ref 65–99)

## 2023-07-10 PROCEDURE — 700101 HCHG RX REV CODE 250: Performed by: INTERNAL MEDICINE

## 2023-07-10 PROCEDURE — 72100 X-RAY EXAM L-S SPINE 2/3 VWS: CPT

## 2023-07-10 PROCEDURE — 73521 X-RAY EXAM HIPS BI 2 VIEWS: CPT

## 2023-07-10 PROCEDURE — A9270 NON-COVERED ITEM OR SERVICE: HCPCS | Performed by: INTERNAL MEDICINE

## 2023-07-10 PROCEDURE — 99239 HOSP IP/OBS DSCHRG MGMT >30: CPT | Performed by: INTERNAL MEDICINE

## 2023-07-10 PROCEDURE — 97535 SELF CARE MNGMENT TRAINING: CPT

## 2023-07-10 PROCEDURE — 97162 PT EVAL MOD COMPLEX 30 MIN: CPT

## 2023-07-10 PROCEDURE — 700102 HCHG RX REV CODE 250 W/ 637 OVERRIDE(OP): Performed by: STUDENT IN AN ORGANIZED HEALTH CARE EDUCATION/TRAINING PROGRAM

## 2023-07-10 PROCEDURE — A9270 NON-COVERED ITEM OR SERVICE: HCPCS | Performed by: STUDENT IN AN ORGANIZED HEALTH CARE EDUCATION/TRAINING PROGRAM

## 2023-07-10 PROCEDURE — 97166 OT EVAL MOD COMPLEX 45 MIN: CPT

## 2023-07-10 PROCEDURE — 82962 GLUCOSE BLOOD TEST: CPT

## 2023-07-10 PROCEDURE — 700102 HCHG RX REV CODE 250 W/ 637 OVERRIDE(OP): Performed by: INTERNAL MEDICINE

## 2023-07-10 PROCEDURE — RXMED WILLOW AMBULATORY MEDICATION CHARGE: Performed by: INTERNAL MEDICINE

## 2023-07-10 RX ORDER — HYDROCODONE BITARTRATE AND ACETAMINOPHEN 5; 325 MG/1; MG/1
1 TABLET ORAL EVERY 6 HOURS PRN
Qty: 15 TABLET | Refills: 0 | Status: SHIPPED | OUTPATIENT
Start: 2023-07-10 | End: 2023-07-13 | Stop reason: SDUPTHER

## 2023-07-10 RX ORDER — AMOXICILLIN 250 MG
2 CAPSULE ORAL 2 TIMES DAILY
Qty: 30 TABLET | Refills: 0 | Status: SHIPPED | OUTPATIENT
Start: 2023-07-10 | End: 2023-08-15

## 2023-07-10 RX ADMIN — LIDOCAINE 1 PATCH: 50 PATCH TOPICAL at 11:26

## 2023-07-10 RX ADMIN — POLYETHYLENE GLYCOL 3350 1 PACKET: 17 POWDER, FOR SOLUTION ORAL at 06:15

## 2023-07-10 RX ADMIN — ACETAMINOPHEN 650 MG: 325 TABLET, FILM COATED ORAL at 06:16

## 2023-07-10 RX ADMIN — SENNOSIDES AND DOCUSATE SODIUM 2 TABLET: 50; 8.6 TABLET ORAL at 06:17

## 2023-07-10 RX ADMIN — BISACODYL 10 MG: 10 SUPPOSITORY RECTAL at 11:26

## 2023-07-10 RX ADMIN — TELMISARTAN 40 MG: 40 TABLET ORAL at 06:18

## 2023-07-10 RX ADMIN — CALCITONIN SALMON 200 UNITS: 200 SPRAY, METERED NASAL at 06:19

## 2023-07-10 RX ADMIN — MOMETASONE FUROATE AND FORMOTEROL FUMARATE DIHYDRATE 2 PUFF: 200; 5 AEROSOL RESPIRATORY (INHALATION) at 07:52

## 2023-07-10 RX ADMIN — ACETAMINOPHEN 650 MG: 325 TABLET, FILM COATED ORAL at 11:37

## 2023-07-10 ASSESSMENT — PAIN DESCRIPTION - PAIN TYPE
TYPE: ACUTE PAIN
TYPE: ACUTE PAIN

## 2023-07-10 ASSESSMENT — COGNITIVE AND FUNCTIONAL STATUS - GENERAL
HELP NEEDED FOR BATHING: A LITTLE
WALKING IN HOSPITAL ROOM: A LITTLE
SUGGESTED CMS G CODE MODIFIER MOBILITY: CK
DRESSING REGULAR LOWER BODY CLOTHING: A LITTLE
TOILETING: A LITTLE
TURNING FROM BACK TO SIDE WHILE IN FLAT BAD: A LITTLE
DAILY ACTIVITIY SCORE: 21
MOVING TO AND FROM BED TO CHAIR: A LITTLE
CLIMB 3 TO 5 STEPS WITH RAILING: A LITTLE
MOVING FROM LYING ON BACK TO SITTING ON SIDE OF FLAT BED: A LITTLE
STANDING UP FROM CHAIR USING ARMS: A LITTLE
MOBILITY SCORE: 18
SUGGESTED CMS G CODE MODIFIER DAILY ACTIVITY: CJ

## 2023-07-10 ASSESSMENT — GAIT ASSESSMENTS
ASSISTIVE DEVICE: FRONT WHEEL WALKER
DISTANCE (FEET): 200
GAIT LEVEL OF ASSIST: SUPERVISED

## 2023-07-10 ASSESSMENT — ACTIVITIES OF DAILY LIVING (ADL): TOILETING: INDEPENDENT

## 2023-07-10 NOTE — PROGRESS NOTES
Neurosurgery Progress Note    Subjective:  Lauren Esquivel is a 72 y.o. female who presented to [facility] after ground level fall. Yesterday, patient was cooking and was startled causing her to fall backwards onto her back. She landed on stone j[place]el[place]. -LOC. PT c/o back pain. Imaging shows L1 compression fracture. Denies motor weakness, sensory changes, or bowel/bladder issues.    No events overnight  Pain controlled with PO Tylenol only  TLSO at bedside  Lumbar x-rays not completed  C/o b/l hip pain last night    Exam:  Pleasant and cooperative  LE strength 5/5 throughout  Sensation intact throughout    BP  Min: 131/86  Max: 147/72  Pulse  Av  Min: 77  Max: 77  Resp  Av.5  Min: 17  Max: 18  Temp  Av.4 °C (97.5 °F)  Min: 36 °C (96.8 °F)  Max: 36.7 °C (98.1 °F)  SpO2  Av.5 %  Min: 90 %  Max: 91 %    No data recorded    Recent Labs     23  0045   WBC 8.4   RBC 4.71   HEMOGLOBIN 13.6   HEMATOCRIT 42.2   MCV 89.6   MCH 28.9   MCHC 32.2   RDW 44.1   PLATELETCT 265   MPV 10.2     Recent Labs     23  0241 23  0045   SODIUM 138 137   POTASSIUM 4.0 3.9   CHLORIDE 105 103   CO2 21 22   GLUCOSE 107* 112*   BUN 20 17   CREATININE 0.63 0.72   CALCIUM 9.3 9.3               Intake/Output                         23 - 07/10/23 0659 07/10/23 0700 - 23 0659      Total  Total                 Intake    P.O.  600  -- 600  360  -- 360    P.O. 600 -- 600 360 -- 360    Total Intake 600 -- 600 360 -- 360       Output    Urine  --  -- --  --  -- --    Number of Times Voided -- 1 x 1 x -- -- --    Total Output -- -- -- -- -- --       Net I/O     600 -- 600 360 -- 360              Intake/Output Summary (Last 24 hours) at 7/10/2023 1221  Last data filed at 7/10/2023 1100  Gross per 24 hour   Intake 720 ml   Output --   Net 720 ml             atorvastatin  20 mg QHS    telmisartan  40 mg DAILY    mometasone-formoterol  2 Puff BID (RT)     ketorolac  15 mg Q6HRS PRN    calcitonin (salmon)  1 Spray DAILY    lidocaine  1 Patch Q24HR    albuterol  2 Puff Q6HRS PRN (RT)    Respiratory Therapy Consult   Continuous RT    acetaminophen  650 mg Q6HRS PRN    ondansetron  4 mg Q4HRS PRN    ondansetron  4 mg Q4HRS PRN    senna-docusate  2 Tablet BID    And    polyethylene glycol/lytes  1 Packet QDAY PRN    And    magnesium hydroxide  30 mL QDAY PRN    And    bisacodyl  10 mg QDAY PRN    Pharmacy Consult Request  1 Each PHARMACY TO DOSE    oxyCODONE immediate-release  2.5 mg Q3HRS PRN    Or    oxyCODONE immediate-release  5 mg Q3HRS PRN    Or    HYDROmorphone  0.25 mg Q3HRS PRN    labetalol  10 mg Q4HRS PRN    insulin regular  1-6 Units 4X/DAY ACHS    And    dextrose bolus  25 g Q15 MIN PRN    LORazepam  0.5 mg Q5 MIN PRN    albuterol  2.5 mg Q6HRS PRN (RT)       Assessment and Plan:  Hospital day # 2  Ordered stat lumbar and pelvic x-rays  TLSO when OOB  Pain control  PT/OT  Can likely d/c home later today or tomorrow after x-rays are performed and reviewed    Chemical prophylactic DVT therapy: Yes - Lovenox 40mg/qd    Start date/time: 7/10

## 2023-07-10 NOTE — DISCHARGE SUMMARY
Discharge Summary    CHIEF COMPLAINT ON ADMISSION  Chief Complaint   Patient presents with    Fall    Closed Head Injury           Low Back Pain       Reason for Admission  TBI     Admission Date  7/7/2023    CODE STATUS  Full Code    HPI & HOSPITAL COURSE  73 yo woman with HTN, HLD, asthma who presented to the ER with fall onto her back.  CT showed acute L1 compression fracture which was confirmed on MRI.  Dr. Obrien with neurosurgery was consulted and recommended medical management with TLSO brace, pain control and physical therapy.  Her pain did improve.  PT recommended home health, which has been set up.      Therefore, she is discharged in good and stable condition to home with organized home healthcare and close outpatient follow-up.    The patient met 2-midnight criteria for an inpatient stay at the time of discharge.    Discharge Date  7/10/23    FOLLOW UP ITEMS POST DISCHARGE  Follow-up with Dr. Obrien 6 weeks for repeat xrays    DISCHARGE DIAGNOSES  Principal Problem:    Closed compression fracture of L1 lumbar vertebra, initial encounter (HCC) (POA: Yes)  Active Problems:    HTN (hypertension) (POA: Yes)    Hyperlipidemia (POA: Yes)      Overview: 2013: Elevated cholesterol since 2009. Pt does not want to take additional       Rx and does not want to take statins in particular. Discussed risks of       untreated hyperlipidemia      2015: advised medication and dietitian.       2016: 10 year CV Risk of heart disease or stroke 12.3%. Aspirin and high       intensity statin advised.       The 10-year ASCVD risk score ([place] DC Jr., et al., 2013) is: 11.9%    Prediabetes (POA: Yes)    Morbid obesity (HCC) (POA: Yes)    Asthma (POA: Yes)  Resolved Problems:    * No resolved hospital problems. *      FOLLOW UP  Future Appointments   Date Time Provider Department Center   7/13/2023  3:40 PM HEIDI Francisco   8/8/2023  3:20 PM GUILLE King M.D.  [address] [place]  [place] [id number] [phone]      As needed, If symptoms worsen    Louie Obrien M.D.  [address]  [address]  [place] [id number] [phone]    Follow up in 6 week(s)  for repeat xrays    [facility]  [address]  [facility] [address]  [phone]          MEDICATIONS ON DISCHARGE     Medication List        START taking these medications        Instructions   cyclobenzaprine 10 mg Tabs  Commonly known as: Flexeril   Take 1 Tablet by mouth 3 times a day as needed for Muscle Spasms for up to 3 days.  Dose: 10 mg     HYDROcodone-acetaminophen 5-325 MG Tabs per tablet  Commonly known as: Norco   Take 1 Tablet by mouth every 6 hours as needed (severe pain) for up to 7 days.  Dose: 1 Tablet     lidocaine 5 % Ptch  Commonly known as: Lidoderm   Place 1 Patch on the skin every 24 hours for 5 days.  Dose: 1 Patch     senna-docusate 8.6-50 MG Tabs  Commonly known as: Pericolace Or Senokot S   Take 2 Tablets by mouth 2 times a day.  Dose: 2 Tablet            CHANGE how you take these medications        Instructions   telmisartan 40 MG Tabs  What changed: when to take this  Commonly known as: Micardis   Doctor's comments: DX Code Needed  PLEASE REFILL.  TAKE 1 TABLET BY MOUTH EVERY DAY            CONTINUE taking these medications        Instructions   * albuterol 108 (90 Base) MCG/ACT Aers inhalation aerosol   Inhale 2 Puffs by mouth every 6 hours as needed for Shortness of Breath.  Dose: 2 Puff     * albuterol 2.5mg/3ml Nebu solution for nebulization  Commonly known as: Proventil   3 mL by Nebulization route every 6 hours as needed for Shortness of Breath.  Dose: 2.5 mg     metFORMIN  MG Tb24  Commonly known as: Glucophage XR   Take 1 Tablet by mouth 2 times a day.  Dose: 500 mg     Symbicort 160-4.5 MCG/ACT Aero  Generic drug: budesonide-formoterol   INHALE 2 PUFFS 2 TIMES A DAY           * This list has 2 medication(s) that are the same as other medications prescribed for  you. Read the directions carefully, and ask your doctor or other care provider to review them with you.                  Allergies  Allergies   Allergen Reactions    Ace Inhibitors      Cough    Ciprofloxacin     Levaquin [Levofloxacin]        DIET  Orders Placed This Encounter   Procedures    Diet Order Diet: Consistent CHO (Diabetic)     Standing Status:   Standing     Number of Occurrences:   1     Order Specific Question:   Diet:     Answer:   Consistent CHO (Diabetic) [4]       ACTIVITY  As tolerated.    CONSULTATIONS  Dr. Obrien    PROCEDURES  DX-LUMBAR SPINE-2 OR 3 VIEWS   Final Result      1.  Mild L1 vertebral compression fracture similar to prior   2.  Osteopenia   3.  Multilevel multifactorial degenerative changes      DX-HIP-BILATERAL-WITH PELVIS-2 VIEWS   Final Result      1.  No radiographic evidence of acute traumatic injury.   2.  BILATERAL sacroiliac osteoarthritis      MR-LUMBAR SPINE-W/O   Final Result      1.  L4-5 slight grade 1 spondylolisthesis. This is associated with moderate hypertrophic facet arthropathy with prominent synovial fluid.   2.  L1 vertebral body mild superior endplate compression fracture with prominent marrow edema signal consistent with acute or recent subacute insufficiency compression fracture. This would be amenable to vertebral augmentation if indicated for pain    control.   3.  Minor degenerative changes as outlined above for each level in the body of report. No central stenosis or significant foraminal stenosis at any lumbar level.      CT-LSPINE W/O PLUS RECONS   Final Result      1.  Acute L1 compression fracture with minimal loss of height. There is questionable nondisplaced extension of the fracture into the T12 posterior elements.   2.  Multilevel degenerative changes.   3.  Degenerative grade 1 anterolisthesis of L4 on L5.   4.  Osteopenia.      CT-TSPINE W/O PLUS RECONS   Final Result      1.  L1 compression fracture with minimal loss of height. There is  questionable extension into the T12 posterior elements.   2.  Osteopenia.   3.  S-shaped scoliosis of the thoracic spine.   4.  Small lytic lesion of the T12 vertebral body unchanged from 2012 CT, consistent with a benign etiology.   5.  Multilevel degenerative changes.      CT-CSPINE WITHOUT PLUS RECONS   Final Result      1.  No acute traumatic injury of the cervical spine.   2.  Minimal degenerative anterolisthesis of C4 on C5.   3.  Multilevel degenerative changes.   4.  1.6 cm right thyroid nodule. Recommend nonemergent dedicated thyroid ultrasound.      CT-HEAD W/O   Final Result      1.  No acute intracranial abnormality.   2.  Prominent bilateral basal ganglia calcifications.   3.  Diffuse paranasal sinus disease.               LABORATORY  Lab Results   Component Value Date    SODIUM 137 07/09/2023    POTASSIUM 3.9 07/09/2023    CHLORIDE 103 07/09/2023    CO2 22 07/09/2023    GLUCOSE 112 (H) 07/09/2023    BUN 17 07/09/2023    CREATININE 0.72 07/09/2023    CREATININE 0.67 05/24/2012    GLOMRATE >59 08/17/2010        Lab Results   Component Value Date    WBC 8.4 07/09/2023    WBC 7.2 05/24/2012    HEMOGLOBIN 13.6 07/09/2023    HEMATOCRIT 42.2 07/09/2023    PLATELETCT 265 07/09/2023        Total time of the discharge process exceeds 32 minutes.

## 2023-07-10 NOTE — DISCHARGE PLANNING
Received Choice form at 1000  Agency/Facility Name: [facility]  Referral sent per Choice form at 1010    1145- [facility] accepted

## 2023-07-10 NOTE — THERAPY
"Physical Therapy   Initial Evaluation     Patient Name: Lauren Esquivel  Age:  72 y.o., Sex:  female  Medical Record #: [id number]  Today's Date: 7/10/2023     Precautions  Precautions: Fall Risk;TLSO (Thoracolumbosacral orthosis);Spinal / Back Precautions   Comments: TLSO for OOB    Assessment  Patient is 71 yo woman with HTN, HLD, asthma who presented to the ER with fall onto her back.  CT showed acute L1 compression fracture which was confirmed on MRI.  Dr. Obrien with neurosurgery was consulted and recommended medical management with TLSO brace.  Patient seen for PT eval and educated abour brace management and spine precautions. Patient was able to demo all functional mobility at a supervision level, which she will have at home.  No further acute care PT needs at this time.     Plan    Physical Therapy Initial Treatment Plan   Duration: Evaluation only    DC Equipment Recommendations: None (has a FWW)  Discharge Recommendations: Anticipate that the patient will have no further physical therapy needs after discharge from the hospital       Subjective    \"I'm tired\"     Objective     07/10/23 1400   Precautions   Precautions Fall Risk;TLSO (Thoracolumbosacral orthosis);Spinal / Back Precautions    Comments TLSO for OOB   Pain 0 - 10 Group   Location Back   Therapist Pain Assessment 2;During Activity   Prior Living Situation   Prior Services Home-Independent   Housing / Facility 1 Story House   Steps Into Home 2   Rail Both Rail (Steps in Home)   Equipment Owned Front-Wheel Walker   Lives with - Patient's Self Care Capacity Spouse   Comments Lives with her  and her brother. Patient is normally the caregiver for her , helping with cooking and med management   Prior Level of Functional Mobility   Bed Mobility Independent   Transfer Status Independent   Ambulation Independent   Ambulation Distance community   Assistive Devices Used None   Stairs Independent   History of Falls   History of Falls Yes "   Date of Last Fall   (reason for admit)   Cognition    Cognition / Consciousness WDL   Passive ROM Upper Body   Passive ROM Upper Body WDL   Active ROM Upper Body   Active ROM Upper Body  WDL   Strength Upper Body   Upper Body Strength  WDL   Sensation Upper Body   Upper Extremity Sensation  WDL   Upper Body Muscle Tone   Upper Body Muscle Tone  WDL   Active ROM Lower Body    Active ROM Lower Body  WDL   Strength Lower Body   Lower Body Strength  WDL   Sensation Lower Body   Lower Extremity Sensation   WDL   Coordination Upper Body   Coordination WDL   Coordination Lower Body    Coordination Lower Body  WDL   Other Treatments   Other Treatments Provided Discussed DC recs, provided some ADL training and handout for spine precautions. Reviewed brace management   Balance Assessment   Sitting Balance (Static) Good   Sitting Balance (Dynamic) Fair +   Standing Balance (Static) Fair   Standing Balance (Dynamic) Fair   Weight Shift Sitting Good   Weight Shift Standing Fair   Comments w/FWW   Bed Mobility    Supine to Sit Supervised   Sit to Supine Supervised   Scooting Supervised   Rolling Standby Assist   Comments Cueing for log roll   Gait Analysis   Gait Level Of Assist Supervised   Assistive Device Front Wheel Walker   Distance (Feet) 200   # of Times Distance was Traveled 1   Functional Mobility   Sit to Stand Standby Assist   How much difficulty does the patient currently have...   Turning over in bed (including adjusting bedclothes, sheets and blankets)? 3   Sitting down on and standing up from a chair with arms (e.g., wheelchair, bedside commode, etc.) 3   Moving from lying on back to sitting on the side of the bed? 3   How much help from another person does the patient currently need...   Moving to and from a bed to a chair (including a wheelchair)? 3   Need to walk in a hospital room? 3   Climbing 3-5 steps with a railing? 3   6 clicks Mobility Score 18   Activity Tolerance   Sitting Edge of Bed 10 min    Standing 10 min   Education Group   Education Provided Use of Assistive Device;Role of Physical Therapist;Gait Training;Brace Wear and Care;Spine Precautions   Spine Precautions Patient Response Patient;Acceptance;Explanation;Demonstration;Handout;Verbal Demonstration;Action Demonstration   Role of Physical Therapist Patient Response Patient;Acceptance;Explanation;Demonstration;Verbal Demonstration;Action Demonstration   Gait Training Patient Response Patient;Acceptance;Demonstration;Explanation;Verbal Demonstration;Action Demonstration   Use of Assistive Device Patient Response Patient;Acceptance;Explanation;Demonstration;Verbal Demonstration;Action Demonstration   Brace Wear & Care Patient Response Patient;Acceptance;Explanation;Demonstration;Verbal Demonstration;Action Demonstration   Physical Therapy Initial Treatment Plan    Duration Evaluation only   Anticipated Discharge Equipment and Recommendations   DC Equipment Recommendations None  (has a FWW)   Discharge Recommendations Anticipate that the patient will have no further physical therapy needs after discharge from the hospital     Hannah Peraza, PT, DPT, GCS

## 2023-07-10 NOTE — THERAPY
Occupational Therapy   Initial Evaluation     Patient Name: Lauren Esquivel  Age:  72 y.o., Sex:  female  Medical Record #: [id number]  Today's Date: 7/10/2023          Assessment  Patient is 72 y.o. female with a diagnosis of fall, L1 fx.  Pt currently limited by decreased activity tolerance, balance, and pain which are affecting pt's ability to complete ADLs/IADLs at baseline. Pt would benefit from OT services in the acute care setting to maximize functional recovery.    Plan    Occupational Therapy Initial Treatment Plan   Treatment Interventions: (P) Self Care / Activities of Daily Living, Therapeutic Activity  Treatment Frequency: (P) 3 Times per Week  Duration: (P) Until Therapy Goals Met       Discharge Recommendations: (P) Recommend home health for continued occupational therapy services        07/10/23 5874   Prior Living Situation   Housing / Facility 2 Story House   Lives with - Patient's Self Care Capacity Spouse;Adult Children  (she is care giver for )   Prior Level of ADL Function   Self Feeding Independent   Grooming / Hygiene Independent   Bathing Independent   Dressing Independent   Toileting Independent   ADL Assessment   Grooming Supervision   Upper Body Dressing Minimal Assist  (with brace)   Lower Body Dressing Minimal Assist   Functional Mobility   Sit to Stand Standby Assist   Toilet Transfers Standby Assist   Short Term Goals   Short Term Goal # 1 supervised with LB dressing   Short Term Goal # 2 supervised with UB dressingUB   Occupational Therapy Initial Treatment Plan    Treatment Interventions Self Care / Activities of Daily Living;Therapeutic Activity   Treatment Frequency 3 Times per Week   Duration Until Therapy Goals Met   Anticipated Discharge Equipment and Recommendations   Discharge Recommendations Recommend home health for continued occupational therapy services

## 2023-07-10 NOTE — DISCHARGE PLANNING
Case Management Discharge Planning    Admission Date: 7/7/2023  GMLOS: 2.6  ALOS: 2    6-Clicks ADL Score: 21  6-Clicks Mobility Score: 15  PT and/or OT Eval ordered: Yes  Post-acute Referrals Ordered: Yes  Post-acute Choice Obtained: Yes  Has referral(s) been sent to post-acute provider:  Yes      Anticipated Discharge Dispo: Discharge Disposition: D/T to home under HHA care in anticipation of covered skilled care (06)  Discharge Address: [address] [place] [address] [address] [place]  [id number]  Discharge Contact Phone Number: [phone]    DME Needed: possibly home oxygen    Action(s) Taken:   RN CLAUDINE spoke to patient at bedside.  She lives with her spouse and brother in a 2 story house in [place].  Their master bedroom and master bath with grab bars is on the first floor.  Pt does not use DME at baseline.  She is currently receiving O2 1 LNC.  Pt's spouse has signs of memory loss.  Pt prepares his medications and gives them to patient 3 x a day.  She supervises him for bathing and dressing and cooks meals.  She requested information on caregivers and that was provided to her.  I also gave her information on memory care facilities.  Pt stated they have a daughter who will be able to provide extra supervision and assistance.  She lives in [place].  RN CM explained home health and patient agreeable.  Consent obtained to send referrals to [facility] and [facility].  Choice form faxed to [facility].  Pt accepted by [facility].    Medically Clear: Yes    Next Steps: Assist with home oxygen if needed.    Barriers to Discharge: None    Care Transition Team Assessment    Information Source  Orientation Level: Oriented X4  Information Given By: Patient  Who is responsible for making decisions for patient? : Patient    Readmission Evaluation  Is this a readmission?: No    Elopement Risk  Legal Hold: No  Ambulatory or Self Mobile in Wheelchair: No-Not an Elopement Risk  Elopement Risk: Not at Risk for Elopement    Interdisciplinary Discharge  Planning  Lives with - Patient's Self Care Capacity: Spouse, Adult Children (she is care giver for )  Patient or legal guardian wants to designate a caregiver: No  Support Systems: Family Member(s), Children, Spouse / Significant Other  Housing / Facility: 2 Story House  Name of Care Facility: na  Durable Medical Equipment: Not Applicable  DME Provider / Phone: bo    Discharge Preparedness  What is your plan after discharge?: Home health care, Home with help  What are your discharge supports?: Child, Spouse  Prior Functional Level: Ambulatory, Independent with Activities of Daily Living, Independent with Medication Management, Drives Self  Difficulity with ADLs: Walking  Difficulity with IADLs: None    Functional Assesment  Prior Functional Level: Ambulatory, Independent with Activities of Daily Living, Independent with Medication Management, Drives Self    Finances  Financial Barriers to Discharge: No  Prescription Coverage: Yes    Vision / Hearing Impairment  Vision Impairment : No  Right Eye Vision: Impaired, Wears Glasses  Left Eye Vision: Impaired, Wears Glasses  Hearing Impairment : No         Advance Directive  Advance Directive?: None    Domestic Abuse  Have you ever been the victim of abuse or violence?: No  Physical Abuse or Sexual Abuse: No  Verbal Abuse or Emotional Abuse: No  Possible Abuse/Neglect Reported to:: Not Applicable    Psychological Assessment  History of Substance Abuse: None  History of Psychiatric Problems: No    Discharge Risks or Barriers  Discharge risks or barriers?: No    Anticipated Discharge Information  Discharge Disposition: D/T to home under HHA care in anticipation of covered skilled care (06)  Discharge Address: [address] [place] [address] [address] [place]  [address]  Discharge Contact Phone Number: [phone]

## 2023-07-10 NOTE — PROGRESS NOTES
DC instructions reviewed w/ pt, verbalized understanding. No PIV on arrival to dcl, meds to bed delivered. Armband removed.

## 2023-07-10 NOTE — CARE PLAN
The patient is Stable - Low risk of patient condition declining or worsening    Shift Goals  Clinical Goals: Rehab/pain control  Patient Goals: pain control    Progress made toward(s) clinical / shift goals:    Problem: Pain - Standard  Goal: Alleviation of pain or a reduction in pain to the patient’s comfort goal 3          Patient will state pain 3/10 at the end of the shift.  Outcome: Progressing     Problem: Knowledge Deficit - Standard  Goal: Patient and family/care givers will demonstrate understanding of plan of care, disease process/condition, diagnostic tests and medications  Outcome: Progressing     Problem: Fall Risk  Goal: Patient will remain free from falls  Outcome: Progressing       Patient is not progressing towards the following goals:

## 2023-07-10 NOTE — PROGRESS NOTES
X-rays reviewed, no new fx or worsening malalignment.  OK for d/c home.  Continue TLSO when OOB.  F/u with Dr. Obrien or PA at [facility] in 6 weeks with updated x-rays.  We'll call pt to schedule, thanks

## 2023-07-11 ENCOUNTER — PATIENT OUTREACH (OUTPATIENT)
Dept: MEDICAL GROUP | Age: 73
End: 2023-07-11
Payer: MEDICARE

## 2023-07-11 NOTE — CARE PLAN
The patient is Stable - Low risk of patient condition declining or worsening    Shift Goals  Clinical Goals:  (pain management, back brace)  Patient Goals:  (pain management)    Progress made toward(s) clinical / shift goals:    Problem: Pain - Standard  Goal: Alleviation of pain or a reduction in pain to the patient’s comfort goal  Outcome: Progressing  Note: Patient using Tylenol PO for pain management     Problem: Fall Risk  Goal: Patient will remain free from falls  Outcome: Progressing  Note: Patient using front wheel walker, TLSO brace and standby assist       Patient is not progressing towards the following goals:

## 2023-07-11 NOTE — PROGRESS NOTES
Transitional Care Management  TCM Outreach Date and Time: Filed (7/11/2023 12:55 PM)    Discharge Questions  Actual Discharge Date: 07/10/23  Now that you are home, how are you feeling?: Good  Did you receive any new prescriptions?: Yes  Were you able to get them filled?: Yes  Meds to Bed or Pharmacy filled?: Pharmacy  Do you have any questions about your current medications or new medications (Review Med Rec)?: Yes (Please explain) (Had questions about how to take the Flexeril and the Norco.  Reviewed directions and how to alternate the meds for maximum pain relief.)  Do you have a follow up appointment scheduled with your PCP?: Yes (The TCM PCP appointment was scheduled prior to call which patient didn't realize.  Reviewed the purpose of the follow-up.  Patient states she may need more pain meds/instructed to reveiw on PCP visit 7/13)  Appointment Date: 07/13/23  Appointment Time: 1540  Any issues or paperwork you wish to discuss with your PCP?: No  Does this patient qualify for the CCM program?: No    Transitional Care  Number of attempts made to contact patient: 1  Current or previous attempts competed within two business days of discharge? : Yes  Provided education regarding treatment plan, medications, self-management, ADLs?: Yes (Pt. has Home Care scheduled to open today.  Reviewed what she can expect from them and answered questions regarding their visits.)  Has patient completed an Advanced Directive?: No  Has the Care Manager's phone number provided?: No  Is there anything else I can help you with?: No    Discharge Summary  Chief Complaint: Fall; Closed Head Injury; Low Back Pain  Admitting Diagnosis: Fall; TBI; L1 Compression Fx.  Discharge Diagnosis: Closed Compression Fx. L1; HTN

## 2023-07-13 ENCOUNTER — OFFICE VISIT (OUTPATIENT)
Dept: MEDICAL GROUP | Age: 73
End: 2023-07-13
Payer: MEDICARE

## 2023-07-13 VITALS
OXYGEN SATURATION: 93 % | DIASTOLIC BLOOD PRESSURE: 68 MMHG | BODY MASS INDEX: 36.65 KG/M2 | HEART RATE: 75 BPM | TEMPERATURE: 96.9 F | SYSTOLIC BLOOD PRESSURE: 112 MMHG | WEIGHT: 220 LBS | HEIGHT: 65 IN

## 2023-07-13 DIAGNOSIS — Z09 HOSPITAL DISCHARGE FOLLOW-UP: Primary | ICD-10-CM

## 2023-07-13 DIAGNOSIS — S32.010A CLOSED COMPRESSION FRACTURE OF L1 VERTEBRA, INITIAL ENCOUNTER (HCC): ICD-10-CM

## 2023-07-13 PROCEDURE — 99496 TRANSJ CARE MGMT HIGH F2F 7D: CPT | Performed by: PHYSICIAN ASSISTANT

## 2023-07-13 PROCEDURE — 3078F DIAST BP <80 MM HG: CPT | Performed by: PHYSICIAN ASSISTANT

## 2023-07-13 PROCEDURE — 3074F SYST BP LT 130 MM HG: CPT | Performed by: PHYSICIAN ASSISTANT

## 2023-07-13 RX ORDER — HYDROCODONE BITARTRATE AND ACETAMINOPHEN 5; 325 MG/1; MG/1
1 TABLET ORAL NIGHTLY PRN
Qty: 20 TABLET | Refills: 0 | Status: SHIPPED | OUTPATIENT
Start: 2023-07-27 | End: 2023-08-15

## 2023-07-13 RX ORDER — CYCLOBENZAPRINE HCL 10 MG
10 TABLET ORAL 3 TIMES DAILY PRN
Qty: 30 TABLET | Refills: 0 | Status: SHIPPED | OUTPATIENT
Start: 2023-07-13 | End: 2023-08-15

## 2023-07-13 ASSESSMENT — FIBROSIS 4 INDEX: FIB4 SCORE: 1.19

## 2023-07-13 NOTE — PROGRESS NOTES
Subjective:     Lauren Esquivel is a 72 y.o. female who presents for Hospital Follow-up.  Unable to see PCP, Dr. Paz.    Transitional Care Management  TCM Outreach Date and Time: Filed (7/11/2023 12:55 PM)    Discharge Questions  Actual Discharge Date: 07/10/23  Now that you are home, how are you feeling?: Good  Did you receive any new prescriptions?: Yes  Were you able to get them filled?: Yes  Meds to Bed or Pharmacy filled?: Pharmacy  Do you have any questions about your current medications or new medications (Review Med Rec)?: Yes (Please explain) (Had questions about how to take the Flexeril and the Norco.  Reviewed directions and how to alternate the meds for maximum pain relief.)  Do you have a follow up appointment scheduled with your PCP?: Yes (The TCM PCP appointment was scheduled prior to call which patient didn't realize.  Reviewed the purpose of the follow-up.  Patient states she may need more pain meds/instructed to reveiw on PCP visit 7/13)  Appointment Date: 07/13/23  Appointment Time: 1540  Any issues or paperwork you wish to discuss with your PCP?: No  Does this patient qualify for the CCM program?: No    Transitional Care  Number of attempts made to contact patient: 1  Current or previous attempts competed within two business days of discharge? : Yes  Provided education regarding treatment plan, medications, self-management, ADLs?: Yes (Pt. has Home Care scheduled to open today.  Reviewed what she can expect from them and answered questions regarding their visits.)  Has patient completed an Advanced Directive?: No  Has the Care Manager's phone number provided?: No  Is there anything else I can help you with?: No    Discharge Summary  Chief Complaint: Fall; Closed Head Injury; Low Back Pain  Admitting Diagnosis: Fall; TBI; L1 Compression Fx.  Discharge Diagnosis: Closed Compression Fx. L1; HTN        HPI:   Recently hospitalized for after presenting to the ER after falling onto her back.  CT  showed acute L1 compression fracture, was confirmed on MRI.  Dr. Obrien with neurosurgery was consulted and he recommended medication management with TLSO brace, pain control, and physical therapy.  She was referred to home health.  She was discharged home with 10 mg Flexeril, Norco 5 mg-325mg, lidocaine patches and senna-docusate.    In clinic today, she is here with her daughter.  She is wearing her brace.  She does have an appointment set up with home health nursing and physical therapy next week.  She rates her pain 3/10.  Has been tolerable during the day, with Tylenol as needed.  Is taking the Flexeril towards the end of the day, then a Norco at night.  This regimen seems to be working well for her.  They have not set an appointment with neurosurgery yet.  She is using walker for stability.  She does still have #15 left knee Norco, she is almost out of the Flexeril.  She is taking the stool softener, she is starting to have bowel movements more regularly.  Denies numbness/tingling, loss of bowel or bladder function.    Current medicines (including reconciliation performed today)  Current Outpatient Medications   Medication Sig Dispense Refill    [START ON 7/27/2023] HYDROcodone-acetaminophen (NORCO) 5-325 MG Tab per tablet Take 1 Tablet by mouth at bedtime as needed (severe pain) for up to 20 days. 20 Tablet 0    cyclobenzaprine (FLEXERIL) 10 mg Tab Take 1 Tablet by mouth 3 times a day as needed for Muscle Spasms or Moderate Pain. 30 Tablet 0    senna-docusate (PERICOLACE OR SENOKOT S) 8.6-50 MG Tab Take 2 Tablets by mouth 2 times a day. 30 Tablet 0    lidocaine (LIDODERM) 5 % Patch Place 1 Patch on the skin every 24 hours for 5 days. 5 Patch 0    SYMBICORT 160-4.5 MCG/ACT Aerosol INHALE 2 PUFFS 2 TIMES A DAY (Patient taking differently: Inhale 2 Puffs 2 times a day.) 10.2 Each 11    telmisartan (MICARDIS) 40 MG Tab TAKE 1 TABLET BY MOUTH EVERY DAY (Patient taking differently: Take 40 mg by mouth every day.) 90  "Tablet 3    metFORMIN ER (GLUCOPHAGE XR) 500 MG TABLET SR 24 HR Take 1 Tablet by mouth 2 times a day. 180 Tablet 3    albuterol (PROVENTIL) 2.5mg/3ml Nebu Soln solution for nebulization 3 mL by Nebulization route every 6 hours as needed for Shortness of Breath. 75 mL 0    albuterol 108 (90 Base) MCG/ACT Aero Soln inhalation aerosol Inhale 2 Puffs by mouth every 6 hours as needed for Shortness of Breath. 8.5 g 2     No current facility-administered medications for this visit.       Allergies:   Ace inhibitors, Ciprofloxacin, and Levaquin [levofloxacin]    Social History     Tobacco Use    Smoking status: Never    Smokeless tobacco: Never   Vaping Use    Vaping Use: Never used   Substance Use Topics    Alcohol use: No     Alcohol/week: 0.0 oz    Drug use: No       ROS:  See above    Objective:     Vitals:    07/13/23 1537   BP: 112/68   BP Location: Left arm   Patient Position: Sitting   BP Cuff Size: Large adult   Pulse: 75   Temp: 36.1 °C (96.9 °F)   SpO2: 93%   Weight: 99.8 kg (220 lb)   Height: 1.651 m (5' 5\")     Body mass index is 36.61 kg/m².    Physical Exam:    General: Alert, pleasant, NAD, wearing TLSO brace  HEENT: Normocephalic. Neck supple.  No thyromegaly or masses palpated. No cervical or supraclavicular lymphadenopathy. No carotid bruits   Heart: Regular rate and rhythm.  S1 and S2 normal.  No murmurs appreciated.  Respiratory: Normal respiratory effort.  Clear to auscultation bilaterally.  Skin: Warm, dry, no rashes.  Psych:  Affect/mood is normal, judgement is good, memory is intact, grooming is appropriate  Assessment and Plan:     1. Hospital discharge follow-up  Discharge medications reviewed and reconciled    2. Closed compression fracture of L1 vertebra, initial encounter ([facility])  -Continue to wear TLSO brace, has home health nursing and physical therapy coming next week.  Continue to use walker.  Pain level is stable with Tylenol ibuprofen during the day, takes cyclobenzaprine and Norco later at " night.  She does need a refill of the Flexeril, but does not need a refill of the Norco for about another 2 weeks.  Norco postdated for 2 weeks.  Referral placed to neurosurgery, advised to call and schedule an appointment.  Continue with stool softener, add in fiber for bowel regulation  - Referral to Neurosurgery  - HYDROcodone-acetaminophen (NORCO) 5-325 MG Tab per tablet; Take 1 Tablet by mouth at bedtime as needed (severe pain) for up to 20 days.  Dispense: 20 Tablet; Refill: 0  - cyclobenzaprine (FLEXERIL) 10 mg Tab; Take 1 Tablet by mouth 3 times a day as needed for Muscle Spasms or Moderate Pain.  Dispense: 30 Tablet; Refill: 0  - Consent for Opiate Prescription    - Chart and discharge summary were reviewed.   - Hospitalization and results reviewed with patient.   - Medications reviewed including instructions regarding high risk medications, dosing and side effects.  - Recommended Services: No services needed at this time  - Advance directive/POLST on file?  No     Follow-up:Return in about 4 weeks (around 8/10/2023) for AWV w/pcp.    Face-to-face transitional care management services with HIGH (today's visit is within days post discharge & LACE+ score 59+) medical decision complexity were provided.

## 2023-07-13 NOTE — PROGRESS NOTES
Subjective:     Lauren Esquivel is a 72 y.o. female who presents for Hospital Follow-up.    Transitional Care Management  TCM Outreach Date and Time: Filed (7/11/2023 12:55 PM)    Discharge Questions  Actual Discharge Date: 07/10/23  Now that you are home, how are you feeling?: Good  Did you receive any new prescriptions?: Yes  Were you able to get them filled?: Yes  Meds to Bed or Pharmacy filled?: Pharmacy  Do you have any questions about your current medications or new medications (Review Med Rec)?: Yes (Please explain) (Had questions about how to take the Flexeril and the Norco.  Reviewed directions and how to alternate the meds for maximum pain relief.)  Do you have a follow up appointment scheduled with your PCP?: Yes (The TCM PCP appointment was scheduled prior to call which patient didn't realize.  Reviewed the purpose of the follow-up.  Patient states she may need more pain meds/instructed to reveiw on PCP visit 7/13)  Appointment Date: 07/13/23  Appointment Time: 1540  Any issues or paperwork you wish to discuss with your PCP?: No  Does this patient qualify for the CCM program?: No    Transitional Care  Number of attempts made to contact patient: 1  Current or previous attempts competed within two business days of discharge? : Yes  Provided education regarding treatment plan, medications, self-management, ADLs?: Yes (Pt. has Home Care scheduled to open today.  Reviewed what she can expect from them and answered questions regarding their visits.)  Has patient completed an Advanced Directive?: No  Has the Care Manager's phone number provided?: No  Is there anything else I can help you with?: No    Discharge Summary  Chief Complaint: Fall; Closed Head Injury; Low Back Pain  Admitting Diagnosis: Fall; TBI; L1 Compression Fx.  Discharge Diagnosis: Closed Compression Fx. L1; HTN        HPI:   Recently hospitalized for ***    Current medicines (including reconciliation performed today)  Current Outpatient  "Medications   Medication Sig Dispense Refill   • senna-docusate (PERICOLACE OR SENOKOT S) 8.6-50 MG Tab Take 2 Tablets by mouth 2 times a day. 30 Tablet 0   • HYDROcodone-acetaminophen (NORCO) 5-325 MG Tab per tablet Take 1 Tablet by mouth every 6 hours as needed (severe pain) for up to 7 days. 15 Tablet 0   • lidocaine (LIDODERM) 5 % Patch Place 1 Patch on the skin every 24 hours for 5 days. 5 Patch 0   • SYMBICORT 160-4.5 MCG/ACT Aerosol INHALE 2 PUFFS 2 TIMES A DAY (Patient taking differently: Inhale 2 Puffs 2 times a day.) 10.2 Each 11   • telmisartan (MICARDIS) 40 MG Tab TAKE 1 TABLET BY MOUTH EVERY DAY (Patient taking differently: Take 40 mg by mouth every day.) 90 Tablet 3   • metFORMIN ER (GLUCOPHAGE XR) 500 MG TABLET SR 24 HR Take 1 Tablet by mouth 2 times a day. 180 Tablet 3   • albuterol (PROVENTIL) 2.5mg/3ml Nebu Soln solution for nebulization 3 mL by Nebulization route every 6 hours as needed for Shortness of Breath. 75 mL 0   • albuterol 108 (90 Base) MCG/ACT Aero Soln inhalation aerosol Inhale 2 Puffs by mouth every 6 hours as needed for Shortness of Breath. 8.5 g 2     No current facility-administered medications for this visit.       Allergies:   Ace inhibitors, Ciprofloxacin, and Levaquin [levofloxacin]    Social History     Tobacco Use   • Smoking status: Never   • Smokeless tobacco: Never   Vaping Use   • Vaping Use: Never used   Substance Use Topics   • Alcohol use: No     Alcohol/week: 0.0 oz   • Drug use: No       ROS:  ***    Objective:     Vitals:    07/13/23 1537   BP: 112/68   BP Location: Left arm   Patient Position: Sitting   BP Cuff Size: Large adult   Pulse: 75   Temp: 36.1 °C (96.9 °F)   SpO2: 93%   Weight: 99.8 kg (220 lb)   Height: 1.651 m (5' 5\")     Body mass index is 36.61 kg/m².    Physical Exam:  ***    Assessment and Plan:   1. Hospital discharge follow-up    2. Closed compression fracture of L1 vertebra, initial encounter ([facility])      - Chart and discharge summary were " reviewed.   - Hospitalization and results reviewed with patient.   - Medications reviewed including instructions regarding high risk medications, dosing and side effects.  - Recommended Services: {COORDINATION OF SERVICES:20405}  - Advance directive/POLST on file?  {Yes/No:20266}    Follow-up:No follow-ups on file.    Face-to-face transitional care management services with {TCM Complexity:44127} medical decision complexity were provided.     {   Reference text will not save to note  Coding guide  82648  face-to-face within 14 days  Moderate decision complexity  65614  face-to-face within 7 days  High medical decision complexity    :33573}

## 2023-07-17 DIAGNOSIS — Z78.0 ASYMPTOMATIC POSTMENOPAUSAL STATE: ICD-10-CM

## 2023-07-17 DIAGNOSIS — Z13.820 SCREENING FOR OSTEOPOROSIS: ICD-10-CM

## 2023-07-22 DIAGNOSIS — R73.03 PREDIABETES: ICD-10-CM

## 2023-07-25 RX ORDER — METFORMIN HYDROCHLORIDE 500 MG/1
500 TABLET, EXTENDED RELEASE ORAL 2 TIMES DAILY
Qty: 180 TABLET | Refills: 3 | Status: SHIPPED | OUTPATIENT
Start: 2023-07-25

## 2023-08-15 ENCOUNTER — OFFICE VISIT (OUTPATIENT)
Dept: MEDICAL GROUP | Facility: MEDICAL CENTER | Age: 73
End: 2023-08-15
Payer: MEDICARE

## 2023-08-15 VITALS
HEIGHT: 65 IN | HEART RATE: 69 BPM | BODY MASS INDEX: 36.38 KG/M2 | DIASTOLIC BLOOD PRESSURE: 80 MMHG | OXYGEN SATURATION: 94 % | TEMPERATURE: 97.2 F | SYSTOLIC BLOOD PRESSURE: 124 MMHG | WEIGHT: 218.37 LBS

## 2023-08-15 DIAGNOSIS — Z91.81 RISK FOR FALLS: ICD-10-CM

## 2023-08-15 DIAGNOSIS — Z78.0 ASYMPTOMATIC POSTMENOPAUSAL STATE: ICD-10-CM

## 2023-08-15 DIAGNOSIS — S32.010A CLOSED COMPRESSION FRACTURE OF L1 LUMBAR VERTEBRA, INITIAL ENCOUNTER (HCC): ICD-10-CM

## 2023-08-15 DIAGNOSIS — M85.88 OSTEOPENIA OF LUMBAR SPINE: ICD-10-CM

## 2023-08-15 DIAGNOSIS — R73.03 PREDIABETES: ICD-10-CM

## 2023-08-15 DIAGNOSIS — Z02.9 ADMINISTRATIVE ENCOUNTER: ICD-10-CM

## 2023-08-15 DIAGNOSIS — I10 PRIMARY HYPERTENSION: ICD-10-CM

## 2023-08-15 DIAGNOSIS — E78.00 PURE HYPERCHOLESTEROLEMIA: ICD-10-CM

## 2023-08-15 PROCEDURE — 3079F DIAST BP 80-89 MM HG: CPT | Performed by: FAMILY MEDICINE

## 2023-08-15 PROCEDURE — 3074F SYST BP LT 130 MM HG: CPT | Performed by: FAMILY MEDICINE

## 2023-08-15 PROCEDURE — 99214 OFFICE O/P EST MOD 30 MIN: CPT | Performed by: FAMILY MEDICINE

## 2023-08-15 ASSESSMENT — FIBROSIS 4 INDEX: FIB4 SCORE: 1.19

## 2023-08-15 NOTE — PROGRESS NOTES
Subjective:   CC: DMV  license renewal    HPI:     Lauren Esquivel is a 72 y.o. female, established patient of the clinic.     Patient presents to have her exam for  license renewal.  She denies any progressive eye diseases.  Patient suffered ground-level fall couple weeks ago leading to mild compression fracture of L1.  Patient is currently working with home health physical therapy.  She is ambulating with a cane for stability.  She is not driving currently.  Pain is controlled with Tylenol.  Patient is unable to tolerate oxycodone or hydrocodone due to severe constipation and sedation.  Patient has follow-up appointment with neurosurgeon today.  Patient had normal bone scan in 2021.  However, lumbar x-ray done during her recent ER visit showed evidence of osteopenia.  Patient is taking calcium and vitamin D daily.  Negative history of tobacco abuse, alcohol abuse, chronic steroid use, familial history of osteoporosis.  She continues to take medication for hypertension, prediabetes, hyperlipidemia.    Current medicines (including changes today)  Current Outpatient Medications   Medication Sig Dispense Refill    metFORMIN ER (GLUCOPHAGE XR) 500 MG TABLET SR 24 HR TAKE 1 TABLET BY MOUTH TWICE A  Tablet 3    SYMBICORT 160-4.5 MCG/ACT Aerosol INHALE 2 PUFFS 2 TIMES A DAY (Patient taking differently: Inhale 2 Puffs 2 times a day.) 10.2 Each 11    telmisartan (MICARDIS) 40 MG Tab TAKE 1 TABLET BY MOUTH EVERY DAY (Patient taking differently: Take 40 mg by mouth every day.) 90 Tablet 3    albuterol (PROVENTIL) 2.5mg/3ml Nebu Soln solution for nebulization 3 mL by Nebulization route every 6 hours as needed for Shortness of Breath. 75 mL 0    albuterol 108 (90 Base) MCG/ACT Aero Soln inhalation aerosol Inhale 2 Puffs by mouth every 6 hours as needed for Shortness of Breath. 8.5 g 2     No current facility-administered medications for this visit.     She  has a past medical history of Anemia, Anxiety  "state, unspecified (1/3/2010), ASTHMA, Chronic rhinitis (1/3/2010), COPD, Elevated C-reactive protein (CRP) (2/6/2012), H/O pyelonephritis (8/21/2012), H/O: female infertility (8/10/2011), History of nephrolithiasis (8/21/2012), HTN, HTN (hypertension) (8/11/2010), Hyperglycemia (6/4/2012), Hyperlipidemia (8/28/2010), Influenza A with pneumonia (12/27/2016), Kidney filling defect (8/21/2012), Laryngeal spasm (1/3/2010), Metabolic syndrome (6/4/2012), Mycoplasma pneumonia (2003), Obesity (6/4/2012), Preventative health care (8/11/2010), Pyelonephritis (7/30/2012), Respiratory malfunction arising from mental factors (1/3/2010), Sleep apnea, Vitamin d deficiency (8/28/2010), and Wheeze (12/4/2009).    She has no past medical history of Encounter for long-term (current) use of other medications.    I reviewed patient's problem list, allergies, medications, family hx, social hx with patient and update EPIC.        Objective:     /80 (BP Location: Left arm, Patient Position: Sitting, BP Cuff Size: Adult)   Pulse 69   Temp 36.2 °C (97.2 °F) (Temporal)   Ht 1.651 m (5' 5\")   Wt 99 kg (218 lb 5.9 oz)   SpO2 94%  Body mass index is 36.34 kg/m².    Physical Exam:  Constitutional: awake, alert, in no distress.  Skin: Warm, dry, good turgor, no rashes, bruises, ulcers in visible areas.  Eye: conjunctiva clear, lids neg for edema or lesions.  Neck: Trachea midline, no masses, no thyromegaly. No cervical or supraclavicular lymphadenopathy  Respiratory: Unlabored respiratory effort, lungs clear to auscultation, no wheezes, no rales.  Neuro: CN2-12 grossly intact. Strength 5/5, reflexes 2/4 in all extremities, no sensory deficits.   Psych: Oriented x3, affect and mood wnl, intact judgement and insight.       Assessment and Plan:   The following treatment plan was discussed    1. Closed compression fracture of L1 lumbar vertebra, initial encounter ([facility])  2. Osteopenia of lumbar spine  Patient suffered ground-level fall " approximately 6 weeks ago leading to mild compression fracture of L1.  Patient was seen by ER on 7/7/2023.  Pain is controlled with extra strength Tylenol.  She is ambulating with a cane for stability.  She is working with home health physical therapy.  She is doing relatively well.  She had follow-up appointment with neurosurgeon today.  She had normal DEXA scan in 2021.  However, x-ray of lumbar spine done during most recent ER visit showed evidence of osteopenia.  -Continue vitamin D and calcium daily  -Fall risk prevention discussed  -Repeat DEXA scan ordered  -Continue Tylenol as needed for pain.  Avoid oxycodone and hydrocodone due to side effects.  -Keep appointment to follow-up with neurosurgeon today.  -Follow-up in 6 weeks     3. Asymptomatic postmenopausal state  - DS-BONE DENSITY STUDY (DEXA); Future    4. Primary hypertension  Chronic, controlled with telmisartan 40 mg daily, no s/e reported, will continue.    - CBC WITH DIFFERENTIAL; Future  - Comp Metabolic Panel; Future  - MICROALBUMIN CREAT RATIO URINE; Future    5. Prediabetes  Chronic, her most recent A1c was 6.4.  Patient is on metformin  mg twice daily.  She tolerates metformin well, no side effect reported.  - HEMOGLOBIN A1C; Future  -Continue metformin as directed  -Follow-up in 6 weeks, A1c repeated for reassessment.    6. Pure hypercholesterolemia  chronic, mild, diet controlled, will continue to monitor.  - Lipid Profile; Future  - dietary modification, exercise, and weight loss.   - avoid alcohol, drugs, tobacco products     7. Risk for falls  - Patient identified as fall risk.  Appropriate orders and counseling given.     8.  Administrative encounter  -No concerns for driving at this time.  -DMV  license renewal approved.    Yessy Paz M.D.      Followup: Return in about 6 weeks (around 9/26/2023) for osteopenia .    Please note that this dictation was created using voice recognition software. I have made every reasonable  attempt to correct obvious errors, but I expect that there are errors of grammar and possibly content that I did not discover before finalizing the note.

## 2023-10-08 NOTE — TELEPHONE ENCOUNTER
Please call patient to schedule appointment for annual wellness visit.   Please remind patient to have labs done before the office visit.  Thank you  
normal (ped)...

## 2023-10-14 ENCOUNTER — HOSPITAL ENCOUNTER (OUTPATIENT)
Dept: LAB | Facility: MEDICAL CENTER | Age: 73
End: 2023-10-14
Attending: FAMILY MEDICINE
Payer: MEDICARE

## 2023-10-14 DIAGNOSIS — E78.00 PURE HYPERCHOLESTEROLEMIA: ICD-10-CM

## 2023-10-14 DIAGNOSIS — I10 PRIMARY HYPERTENSION: ICD-10-CM

## 2023-10-14 DIAGNOSIS — R73.03 PREDIABETES: ICD-10-CM

## 2023-10-14 LAB
ALBUMIN SERPL BCP-MCNC: 4.6 G/DL (ref 3.2–4.9)
ALBUMIN/GLOB SERPL: 1.6 G/DL
ALP SERPL-CCNC: 59 U/L (ref 30–99)
ALT SERPL-CCNC: 31 U/L (ref 2–50)
ANION GAP SERPL CALC-SCNC: 11 MMOL/L (ref 7–16)
AST SERPL-CCNC: 19 U/L (ref 12–45)
BASOPHILS # BLD AUTO: 0.9 % (ref 0–1.8)
BASOPHILS # BLD: 0.06 K/UL (ref 0–0.12)
BILIRUB SERPL-MCNC: 0.4 MG/DL (ref 0.1–1.5)
BUN SERPL-MCNC: 15 MG/DL (ref 8–22)
CALCIUM ALBUM COR SERPL-MCNC: 9.3 MG/DL (ref 8.5–10.5)
CALCIUM SERPL-MCNC: 9.8 MG/DL (ref 8.5–10.5)
CHLORIDE SERPL-SCNC: 103 MMOL/L (ref 96–112)
CHOLEST SERPL-MCNC: 242 MG/DL (ref 100–199)
CO2 SERPL-SCNC: 24 MMOL/L (ref 20–33)
CREAT SERPL-MCNC: 0.61 MG/DL (ref 0.5–1.4)
CREAT UR-MCNC: 71.32 MG/DL
EOSINOPHIL # BLD AUTO: 0.39 K/UL (ref 0–0.51)
EOSINOPHIL NFR BLD: 5.9 % (ref 0–6.9)
ERYTHROCYTE [DISTWIDTH] IN BLOOD BY AUTOMATED COUNT: 43.9 FL (ref 35.9–50)
EST. AVERAGE GLUCOSE BLD GHB EST-MCNC: 128 MG/DL
FASTING STATUS PATIENT QL REPORTED: NORMAL
GFR SERPLBLD CREATININE-BSD FMLA CKD-EPI: 94 ML/MIN/1.73 M 2
GLOBULIN SER CALC-MCNC: 2.8 G/DL (ref 1.9–3.5)
GLUCOSE SERPL-MCNC: 98 MG/DL (ref 65–99)
HBA1C MFR BLD: 6.1 % (ref 4–5.6)
HCT VFR BLD AUTO: 42.4 % (ref 37–47)
HDLC SERPL-MCNC: 64 MG/DL
HGB BLD-MCNC: 13.3 G/DL (ref 12–16)
IMM GRANULOCYTES # BLD AUTO: 0.02 K/UL (ref 0–0.11)
IMM GRANULOCYTES NFR BLD AUTO: 0.3 % (ref 0–0.9)
LDLC SERPL CALC-MCNC: 155 MG/DL
LYMPHOCYTES # BLD AUTO: 1.56 K/UL (ref 1–4.8)
LYMPHOCYTES NFR BLD: 23.6 % (ref 22–41)
MCH RBC QN AUTO: 28.1 PG (ref 27–33)
MCHC RBC AUTO-ENTMCNC: 31.4 G/DL (ref 32.2–35.5)
MCV RBC AUTO: 89.6 FL (ref 81.4–97.8)
MICROALBUMIN UR-MCNC: <1.2 MG/DL
MICROALBUMIN/CREAT UR: NORMAL MG/G (ref 0–30)
MONOCYTES # BLD AUTO: 0.57 K/UL (ref 0–0.85)
MONOCYTES NFR BLD AUTO: 8.6 % (ref 0–13.4)
NEUTROPHILS # BLD AUTO: 4.01 K/UL (ref 1.82–7.42)
NEUTROPHILS NFR BLD: 60.7 % (ref 44–72)
NRBC # BLD AUTO: 0 K/UL
NRBC BLD-RTO: 0 /100 WBC (ref 0–0.2)
PLATELET # BLD AUTO: 318 K/UL (ref 164–446)
PMV BLD AUTO: 10.3 FL (ref 9–12.9)
POTASSIUM SERPL-SCNC: 4.1 MMOL/L (ref 3.6–5.5)
PROT SERPL-MCNC: 7.4 G/DL (ref 6–8.2)
RBC # BLD AUTO: 4.73 M/UL (ref 4.2–5.4)
SODIUM SERPL-SCNC: 138 MMOL/L (ref 135–145)
TRIGL SERPL-MCNC: 114 MG/DL (ref 0–149)
WBC # BLD AUTO: 6.6 K/UL (ref 4.8–10.8)

## 2023-10-14 PROCEDURE — 36415 COLL VENOUS BLD VENIPUNCTURE: CPT

## 2023-10-14 PROCEDURE — 82043 UR ALBUMIN QUANTITATIVE: CPT

## 2023-10-14 PROCEDURE — 80053 COMPREHEN METABOLIC PANEL: CPT

## 2023-10-14 PROCEDURE — 83036 HEMOGLOBIN GLYCOSYLATED A1C: CPT | Mod: GA

## 2023-10-14 PROCEDURE — 82570 ASSAY OF URINE CREATININE: CPT

## 2023-10-14 PROCEDURE — 85025 COMPLETE CBC W/AUTO DIFF WBC: CPT

## 2023-10-14 PROCEDURE — 80061 LIPID PANEL: CPT

## 2023-10-17 ENCOUNTER — APPOINTMENT (OUTPATIENT)
Dept: MEDICAL GROUP | Facility: MEDICAL CENTER | Age: 73
End: 2023-10-17
Payer: MEDICARE

## 2023-10-24 ENCOUNTER — HOSPITAL ENCOUNTER (OUTPATIENT)
Dept: RADIOLOGY | Facility: MEDICAL CENTER | Age: 73
End: 2023-10-24
Attending: FAMILY MEDICINE
Payer: MEDICARE

## 2023-10-24 DIAGNOSIS — Z78.0 ASYMPTOMATIC POSTMENOPAUSAL STATE: ICD-10-CM

## 2023-10-24 PROCEDURE — 77080 DXA BONE DENSITY AXIAL: CPT

## 2023-11-03 ENCOUNTER — TELEPHONE (OUTPATIENT)
Dept: MEDICAL GROUP | Facility: MEDICAL CENTER | Age: 73
End: 2023-11-03
Payer: MEDICARE

## 2023-11-03 NOTE — TELEPHONE ENCOUNTER
Phone Number Called: [phone] (home)     Call outcome: Did not leave a detailed message. Requested patient to call back.    Message: Lvm to cb and schedule apt.  ----- Message from Yessy Paz M.D. sent at 10/28/2023 10:25 PM PDT -----  Please schedule appointment for lab review and to discuss management of L1 compression fracture. Thanks   Yessy Paz M.D.

## 2023-11-17 ENCOUNTER — TELEPHONE (OUTPATIENT)
Dept: HEALTH INFORMATION MANAGEMENT | Facility: OTHER | Age: 73
End: 2023-11-17

## 2023-11-21 ENCOUNTER — NON-PROVIDER VISIT (OUTPATIENT)
Dept: MEDICAL GROUP | Facility: MEDICAL CENTER | Age: 73
End: 2023-11-21
Payer: MEDICARE

## 2023-11-21 DIAGNOSIS — Z23 NEED FOR VACCINATION: ICD-10-CM

## 2023-11-21 DIAGNOSIS — Z12.31 ENCOUNTER FOR SCREENING MAMMOGRAM FOR BREAST CANCER: ICD-10-CM

## 2023-11-21 PROCEDURE — 90662 IIV NO PRSV INCREASED AG IM: CPT | Performed by: FAMILY MEDICINE

## 2023-11-21 PROCEDURE — G0008 ADMIN INFLUENZA VIRUS VAC: HCPCS | Performed by: FAMILY MEDICINE

## 2023-11-22 NOTE — PROGRESS NOTES
"Lauren Esquivel is a 73 y.o. female here for a non-provider visit for:   FLU    Reason for immunization: Annual Flu Vaccine  Immunization records indicate need for vaccine: Yes, confirmed with Epic  Minimum interval has been met for this vaccine: Yes  ABN completed: Yes    VIS Dated  11/21/2023 was given to patient: Yes  All IAC Questionnaire questions were answered \"No.\"    Patient tolerated injection and no adverse effects were observed or reported: Yes    Pt scheduled for next dose in series: No  "

## 2023-11-30 ENCOUNTER — HOSPITAL ENCOUNTER (OUTPATIENT)
Dept: RADIOLOGY | Facility: MEDICAL CENTER | Age: 73
End: 2023-11-30
Attending: FAMILY MEDICINE
Payer: MEDICARE

## 2023-11-30 DIAGNOSIS — Z12.31 ENCOUNTER FOR SCREENING MAMMOGRAM FOR BREAST CANCER: ICD-10-CM

## 2023-11-30 PROCEDURE — 77063 BREAST TOMOSYNTHESIS BI: CPT

## 2023-12-05 PROBLEM — R92.0 MICROCALCIFICATION OF RIGHT BREAST ON MAMMOGRAM: Status: RESOLVED | Noted: 2022-04-25 | Resolved: 2023-12-05

## 2023-12-20 DIAGNOSIS — I10 ESSENTIAL HYPERTENSION: ICD-10-CM

## 2023-12-20 RX ORDER — TELMISARTAN 40 MG/1
TABLET ORAL
Qty: 90 TABLET | Refills: 1 | Status: SHIPPED | OUTPATIENT
Start: 2023-12-20

## 2023-12-20 NOTE — TELEPHONE ENCOUNTER
Received request via: Pharmacy    Was the patient seen in the last year in this department? Yes    Does the patient have an active prescription (recently filled or refills available) for medication(s) requested? No    Does the patient have detention Plus and need 100 day supply (blood pressure, diabetes and cholesterol meds only)? Patient does not have SCP    Future Appointments         Provider Department Center    5/21/2024 3:40 PM (Arrive by 3:25 PM) Yessy Paz M.D. [facility] [place]

## 2024-01-16 ENCOUNTER — OFFICE VISIT (OUTPATIENT)
Dept: URGENT CARE | Facility: CLINIC | Age: 74
End: 2024-01-16
Payer: MEDICARE

## 2024-01-16 VITALS
TEMPERATURE: 97.9 F | WEIGHT: 214 LBS | SYSTOLIC BLOOD PRESSURE: 120 MMHG | HEIGHT: 65 IN | RESPIRATION RATE: 20 BRPM | DIASTOLIC BLOOD PRESSURE: 76 MMHG | OXYGEN SATURATION: 95 % | HEART RATE: 74 BPM | BODY MASS INDEX: 35.65 KG/M2

## 2024-01-16 DIAGNOSIS — J02.9 PHARYNGITIS, UNSPECIFIED ETIOLOGY: ICD-10-CM

## 2024-01-16 LAB
FLUAV RNA SPEC QL NAA+PROBE: NEGATIVE
FLUBV RNA SPEC QL NAA+PROBE: NEGATIVE
RSV RNA SPEC QL NAA+PROBE: NEGATIVE
S PYO DNA SPEC NAA+PROBE: NOT DETECTED
SARS-COV-2 RNA RESP QL NAA+PROBE: NEGATIVE

## 2024-01-16 PROCEDURE — 99213 OFFICE O/P EST LOW 20 MIN: CPT | Performed by: PHYSICIAN ASSISTANT

## 2024-01-16 PROCEDURE — 3078F DIAST BP <80 MM HG: CPT | Performed by: PHYSICIAN ASSISTANT

## 2024-01-16 PROCEDURE — 3074F SYST BP LT 130 MM HG: CPT | Performed by: PHYSICIAN ASSISTANT

## 2024-01-16 PROCEDURE — 0241U POCT CEPHEID COV-2, FLU A/B, RSV - PCR: CPT | Mod: GZ | Performed by: PHYSICIAN ASSISTANT

## 2024-01-16 PROCEDURE — 87651 STREP A DNA AMP PROBE: CPT | Performed by: PHYSICIAN ASSISTANT

## 2024-01-16 ASSESSMENT — ENCOUNTER SYMPTOMS
SORE THROAT: 1
WHEEZING: 0
COUGH: 0
SHORTNESS OF BREATH: 0
DIZZINESS: 1
FEVER: 0
SWOLLEN GLANDS: 0
VOMITING: 0
STRIDOR: 0
CHILLS: 0
HEADACHES: 0
MYALGIAS: 1
NECK PAIN: 0
NAUSEA: 0
DIARRHEA: 0
TROUBLE SWALLOWING: 0

## 2024-01-16 ASSESSMENT — FIBROSIS 4 INDEX: FIB4 SCORE: 0.79

## 2024-01-17 NOTE — PROGRESS NOTES
Subjective     Lauren Esquivel is a 73 y.o. female who presents with Pharyngitis (2 days) and Dizziness (2 days)            Pharyngitis   This is a new problem. Episode onset: 2 days. The problem has been unchanged. There has been no fever. Pertinent negatives include no congestion, coughing, diarrhea, ear pain, headaches, plugged ear sensation, neck pain, shortness of breath, stridor, swollen glands, trouble swallowing or vomiting. Associated symptoms comments: Fatigue, dizziness, body aches. She has tried nothing for the symptoms.     Past Medical History:   Diagnosis Date    Anemia     Anxiety state, unspecified 1/3/2010    ASTHMA     Chronic rhinitis 1/3/2010    COPD     Elevated C-reactive protein (CRP) 2/6/2012    H/O pyelonephritis 8/21/2012    H/O: female infertility 8/10/2011    History of nephrolithiasis 8/21/2012    HTN     HTN (hypertension) 8/11/2010    Hyperglycemia 6/4/2012    Hyperlipidemia 8/28/2010    Influenza A with pneumonia 12/27/2016    [facility]'s admission 2016.     Kidney filling defect 8/21/2012    Laryngeal spasm 1/3/2010    Metabolic syndrome 6/4/2012    Mycoplasma pneumonia 2003    Obesity 6/4/2012    Preventative health care 8/11/2010    Pyelonephritis 7/30/2012    Respiratory malfunction arising from mental factors 1/3/2010    Sleep apnea     Vitamin d deficiency 8/28/2010    Wheeze 12/4/2009         Review of Systems   Constitutional:  Positive for malaise/fatigue. Negative for chills and fever.   HENT:  Positive for sore throat. Negative for congestion, ear pain and trouble swallowing.    Respiratory:  Negative for cough, shortness of breath, wheezing and stridor.    Cardiovascular:  Negative for chest pain and leg swelling.   Gastrointestinal:  Negative for diarrhea, nausea and vomiting.   Musculoskeletal:  Positive for myalgias. Negative for neck pain.   Skin:  Negative for rash.   Neurological:  Positive for dizziness. Negative for headaches.        All other systems  "reviewed and are negative.         Objective     /76   Pulse 74   Temp 36.6 °C (97.9 °F) (Temporal)   Resp 20   Ht 1.651 m (5' 5\")   Wt 97.1 kg (214 lb)   LMP 06/04/2005 (LMP Unknown)   SpO2 95%   BMI 35.61 kg/m²      Physical Exam  Constitutional:       General: She is not in acute distress.     Appearance: She is not ill-appearing.   HENT:      Head: Normocephalic and atraumatic.      Nose: Nose normal.      Mouth/Throat:      Mouth: Mucous membranes are moist.      Pharynx: Posterior oropharyngeal erythema present. No oropharyngeal exudate.   Eyes:      Conjunctiva/sclera: Conjunctivae normal.   Cardiovascular:      Rate and Rhythm: Normal rate and regular rhythm.      Heart sounds: Normal heart sounds. No murmur heard.  Pulmonary:      Effort: Pulmonary effort is normal. No respiratory distress.      Breath sounds: No wheezing, rhonchi or rales.   Lymphadenopathy:      Cervical: Cervical adenopathy (mild anterior. Mild TTP L>R) present.   Skin:     General: Skin is warm and dry.      Findings: No rash.   Neurological:      General: No focal deficit present.      Mental Status: She is alert and oriented to person, place, and time.   Psychiatric:         Mood and Affect: Mood normal.         Behavior: Behavior normal.         Thought Content: Thought content normal.         Judgment: Judgment normal.                           Assessment & Plan        1. Pharyngitis, unspecified etiology  POCT CEPHEID COV-2, FLU A/B, RSV - PCR    POCT CEPHEID GROUP A STREP - PCR          - POCT CEPHEID COV-2, FLU A/B, RSV - PCR- negative  - POCT CEPHEID GROUP A STREP - PCR- negative    Viral etiology discussed. Continue conservative treatment.    Differential diagnoses, Supportive care, and indications for immediate follow-up discussed with patient.   Pathogenesis of diagnosis discussed including typical length and natural progression.   Instructed to return to clinic or nearest emergency department for any change in " condition, further concerns, or worsening of symptoms.      The patient demonstrated a good understanding and agreed with the treatment plan.      Elizabeth Nayak P.A.-C.

## 2024-03-16 ENCOUNTER — OFFICE VISIT (OUTPATIENT)
Dept: URGENT CARE | Facility: CLINIC | Age: 74
End: 2024-03-16
Payer: MEDICARE

## 2024-03-16 VITALS
RESPIRATION RATE: 16 BRPM | WEIGHT: 225 LBS | DIASTOLIC BLOOD PRESSURE: 80 MMHG | HEART RATE: 94 BPM | BODY MASS INDEX: 37.49 KG/M2 | TEMPERATURE: 98.8 F | SYSTOLIC BLOOD PRESSURE: 130 MMHG | HEIGHT: 65 IN | OXYGEN SATURATION: 96 %

## 2024-03-16 DIAGNOSIS — Z20.822 CLOSE EXPOSURE TO COVID-19 VIRUS: ICD-10-CM

## 2024-03-16 PROCEDURE — 0241U POCT CEPHEID COV-2, FLU A/B, RSV - PCR: CPT

## 2024-03-16 PROCEDURE — 99213 OFFICE O/P EST LOW 20 MIN: CPT

## 2024-03-16 PROCEDURE — 3075F SYST BP GE 130 - 139MM HG: CPT

## 2024-03-16 PROCEDURE — 3079F DIAST BP 80-89 MM HG: CPT

## 2024-03-16 RX ORDER — BUDESONIDE AND FORMOTEROL FUMARATE DIHYDRATE 80; 4.5 UG/1; UG/1
2 AEROSOL RESPIRATORY (INHALATION) 2 TIMES DAILY
COMMUNITY

## 2024-03-16 ASSESSMENT — FIBROSIS 4 INDEX: FIB4 SCORE: 0.79

## 2024-03-16 NOTE — PROGRESS NOTES
Subjective:   Lauren Esquivel is a 73 y.o. female who presents for Coronavirus Screening (Requesting a covid test, tired, possible exposure to covid.)      HPI:    Patient presents to urgent care with concerns of possible covid exposure  States her adult son is visiting and has tested positive for covid  Denies rhinorrhea, nasal congestion, headache, sore throat, cough.    Reports increased fatigue  Denies wheezing, chest tightness, SOB  Has pmh of asthma but denies increased asthma activity  Denies fever, chills, night sweats  Her  is in a SNF and is immunocompromised. She wants to know if she has covid so she can visit him. Reports increased stress related to his health.  Tolerating solids and fluids  Reports normal urinary output      ROS As above in HPI    Medications:    Current Outpatient Medications on File Prior to Visit   Medication Sig Dispense Refill    budesonide-formoterol (SYMBICORT) 80-4.5 MCG/ACT Aerosol Inhale 2 Puffs 2 times a day.      telmisartan (MICARDIS) 40 MG Tab TAKE 1 TABLET BY MOUTH EVERY DAY 90 Tablet 1    metFORMIN ER (GLUCOPHAGE XR) 500 MG TABLET SR 24 HR TAKE 1 TABLET BY MOUTH TWICE A  Tablet 3    SYMBICORT 160-4.5 MCG/ACT Aerosol INHALE 2 PUFFS 2 TIMES A DAY (Patient taking differently: Inhale 2 Puffs 2 times a day.) 10.2 Each 11    albuterol (PROVENTIL) 2.5mg/3ml Nebu Soln solution for nebulization 3 mL by Nebulization route every 6 hours as needed for Shortness of Breath. 75 mL 0    albuterol 108 (90 Base) MCG/ACT Aero Soln inhalation aerosol Inhale 2 Puffs by mouth every 6 hours as needed for Shortness of Breath. 8.5 g 2     No current facility-administered medications on file prior to visit.        Allergies:   Ace inhibitors, Ciprofloxacin, and Levaquin [levofloxacin]    Problem List:   Patient Active Problem List   Diagnosis    SENTHIL on CPAP    HTN (hypertension)    Hyperlipidemia    History of nephrolithiasis    Mild persistent asthma without complication,   "Huong    Prediabetes    Morbid obesity (HCC)    Heart murmur    Asthma    Closed compression fracture of L1 lumbar vertebra, initial encounter ([facility])    Risk for falls        Surgical History:  Past Surgical History:   Procedure Laterality Date    LAPAROSCOPY      for infertility    TONSILLECTOMY AND ADENOIDECTOMY         Past Social Hx:   Social History     Tobacco Use    Smoking status: Never    Smokeless tobacco: Never   Vaping Use    Vaping Use: Never used   Substance Use Topics    Alcohol use: No     Alcohol/week: 0.0 oz    Drug use: No          Problem list, medications, and allergies reviewed by myself today in Epic.     Objective:     /80   Pulse 94   Temp 37.1 °C (98.8 °F) (Temporal)   Resp 16   Ht 1.651 m (5' 5\")   Wt 102 kg (225 lb)   LMP 06/04/2005 (LMP Unknown)   SpO2 96%   BMI 37.44 kg/m²     Physical Exam  Vitals and nursing note reviewed.   Constitutional:       General: She is not in acute distress.     Appearance: Normal appearance. She is not ill-appearing or diaphoretic.   HENT:      Head: Normocephalic and atraumatic.      Right Ear: Tympanic membrane and ear canal normal.      Left Ear: Tympanic membrane and ear canal normal.      Nose: Nose normal.      Mouth/Throat:      Mouth: Mucous membranes are moist.      Pharynx: Oropharynx is clear.   Eyes:      Conjunctiva/sclera: Conjunctivae normal.   Cardiovascular:      Rate and Rhythm: Normal rate and regular rhythm.      Heart sounds: Normal heart sounds.   Pulmonary:      Effort: Pulmonary effort is normal.      Breath sounds: Normal breath sounds. No stridor. No wheezing, rhonchi or rales.   Chest:      Chest wall: No tenderness.   Abdominal:      General: Bowel sounds are normal. There is no distension.      Palpations: Abdomen is soft. There is no mass.      Tenderness: There is no abdominal tenderness. There is no right CVA tenderness, left CVA tenderness, guarding or rebound.      Hernia: No hernia is present. "   Lymphadenopathy:      Cervical: No cervical adenopathy.   Skin:     General: Skin is warm and dry.      Capillary Refill: Capillary refill takes less than 2 seconds.   Neurological:      Mental Status: She is alert and oriented to person, place, and time.         Assessment/Plan:       Results for orders placed or performed in visit on 03/16/24   POCT CoV-2, Flu A/B, RSV by PCR   Result Value Ref Range    SARS-CoV-2 by PCR Negative Negative, Invalid    Influenza virus A RNA Negative Negative, Invalid    Influenza virus B, PCR Negative Negative, Invalid    RSV, PCR Negative Negative, Invalid       Diagnosis and associated orders:   1. Close exposure to COVID-19 virus  - POCT CoV-2, Flu A/B, RSV by PCR    Other orders  - budesonide-formoterol (SYMBICORT) 80-4.5 MCG/ACT Aerosol; Inhale 2 Puffs 2 times a day.        Comments/MDM:     Patient presents for covid testing after exposure to covid from her adult son who is visiting her from out of town. She is currently asymptomatic except for fatigue. She states her  in a SNF and is immunosuppressed and wants to make sure she doesn't have covid.   Viral testing is negative today. Discussed testing may be too soon and an accurate test. She is fatigued, which may be an early viral symptom or related to burdens of being the primary caregiver for her .   Repeat testing may be warranted in a few days. Contact precautions reviewed.   Recommend rest, compliance with asthma plans, otc antihistamines or Flonase should she develop sinus symptoms.   Follow up with her PCP advised.          Return to clinic or go to ED if symptoms worsen or persist. Indications for ED discussed at length. Patient/Parent/Guardian voices understanding. Follow-up with your primary care provider in 3-5 days. Red flag symptoms discussed. All side effects of medication discussed including allergic response, GI upset, tendon injury, rash, sedation etc.    Please note that this dictation was  created using voice recognition software. I have made a reasonable attempt to correct obvious errors, but I expect that there are errors of grammar and possibly content that I did not discover before finalizing the note.    This note was electronically signed by JUN Villagomez

## 2024-05-09 ENCOUNTER — TELEPHONE (OUTPATIENT)
Dept: MEDICAL GROUP | Facility: MEDICAL CENTER | Age: 74
End: 2024-05-09
Payer: MEDICARE

## 2024-05-09 DIAGNOSIS — I10 ESSENTIAL HYPERTENSION: ICD-10-CM

## 2024-05-09 DIAGNOSIS — J45.30 MILD PERSISTENT ASTHMA WITHOUT COMPLICATION: ICD-10-CM

## 2024-05-09 RX ORDER — TELMISARTAN 40 MG/1
TABLET ORAL
Qty: 90 TABLET | Refills: 1 | Status: SHIPPED | OUTPATIENT
Start: 2024-05-09 | End: 2024-05-21 | Stop reason: SDUPTHER

## 2024-05-10 DIAGNOSIS — J45.30 MILD PERSISTENT ASTHMA WITHOUT COMPLICATION: ICD-10-CM

## 2024-05-10 RX ORDER — BUDESONIDE AND FORMOTEROL FUMARATE DIHYDRATE 160; 4.5 UG/1; UG/1
2 AEROSOL RESPIRATORY (INHALATION) 2 TIMES DAILY
Qty: 10.3 EACH | Refills: 5 | Status: SHIPPED | OUTPATIENT
Start: 2024-05-10 | End: 2024-05-13

## 2024-05-10 NOTE — TELEPHONE ENCOUNTER
Was the patient seen in the last year in this department? Yes   Does patient have an active prescription for medications requested? No   Received Request Via: Pharmacy

## 2024-05-13 RX ORDER — FLUTICASONE PROPIONATE AND SALMETEROL XINAFOATE 45; 21 UG/1; UG/1
2 AEROSOL, METERED RESPIRATORY (INHALATION) 2 TIMES DAILY
Qty: 12 G | Refills: 5 | Status: SHIPPED
Start: 2024-05-13

## 2024-05-21 ENCOUNTER — OFFICE VISIT (OUTPATIENT)
Dept: MEDICAL GROUP | Facility: MEDICAL CENTER | Age: 74
End: 2024-05-21
Payer: MEDICARE

## 2024-05-21 VITALS
DIASTOLIC BLOOD PRESSURE: 78 MMHG | TEMPERATURE: 97.5 F | WEIGHT: 220.46 LBS | BODY MASS INDEX: 37.64 KG/M2 | OXYGEN SATURATION: 95 % | SYSTOLIC BLOOD PRESSURE: 126 MMHG | HEIGHT: 64 IN | HEART RATE: 86 BPM

## 2024-05-21 DIAGNOSIS — E66.01 MORBID OBESITY (HCC): ICD-10-CM

## 2024-05-21 DIAGNOSIS — G47.00 ACUTE INSOMNIA: ICD-10-CM

## 2024-05-21 DIAGNOSIS — G47.33 OSA ON CPAP: ICD-10-CM

## 2024-05-21 DIAGNOSIS — L30.9 DERMATITIS: ICD-10-CM

## 2024-05-21 DIAGNOSIS — I10 PRIMARY HYPERTENSION: ICD-10-CM

## 2024-05-21 DIAGNOSIS — E78.00 PURE HYPERCHOLESTEROLEMIA: ICD-10-CM

## 2024-05-21 DIAGNOSIS — J45.30 MILD PERSISTENT ASTHMA WITHOUT COMPLICATION: ICD-10-CM

## 2024-05-21 DIAGNOSIS — R73.03 PREDIABETES: ICD-10-CM

## 2024-05-21 PROBLEM — Z91.81 RISK FOR FALLS: Status: RESOLVED | Noted: 2023-08-15 | Resolved: 2024-05-21

## 2024-05-21 PROBLEM — J45.909 ASTHMA: Status: RESOLVED | Noted: 2023-07-08 | Resolved: 2024-05-21

## 2024-05-21 PROBLEM — Z87.81 HISTORY OF VERTEBRAL COMPRESSION FRACTURE: Status: ACTIVE | Noted: 2023-07-08

## 2024-05-21 PROBLEM — E66.9 OBESITY (BMI 30-39.9): Status: ACTIVE | Noted: 2024-05-21

## 2024-05-21 PROCEDURE — 3074F SYST BP LT 130 MM HG: CPT | Performed by: FAMILY MEDICINE

## 2024-05-21 PROCEDURE — 3078F DIAST BP <80 MM HG: CPT | Performed by: FAMILY MEDICINE

## 2024-05-21 PROCEDURE — 99214 OFFICE O/P EST MOD 30 MIN: CPT | Performed by: FAMILY MEDICINE

## 2024-05-21 RX ORDER — TRIAMCINOLONE ACETONIDE 1 MG/G
1 OINTMENT TOPICAL 2 TIMES DAILY
Qty: 30 G | Refills: 2 | Status: SHIPPED | OUTPATIENT
Start: 2024-05-21

## 2024-05-21 RX ORDER — TELMISARTAN 40 MG/1
40 TABLET ORAL
Qty: 90 TABLET | Refills: 3 | Status: SHIPPED | OUTPATIENT
Start: 2024-05-21

## 2024-05-21 RX ORDER — METFORMIN HYDROCHLORIDE 500 MG/1
500 TABLET, EXTENDED RELEASE ORAL 2 TIMES DAILY
Qty: 180 TABLET | Refills: 3 | Status: SHIPPED | OUTPATIENT
Start: 2024-05-21

## 2024-05-21 RX ORDER — ZOLPIDEM TARTRATE 5 MG/1
5 TABLET ORAL NIGHTLY PRN
Qty: 30 TABLET | Refills: 0 | Status: SHIPPED | OUTPATIENT
Start: 2024-05-21 | End: 2024-06-20

## 2024-05-21 RX ORDER — ALBUTEROL SULFATE 90 UG/1
2 AEROSOL, METERED RESPIRATORY (INHALATION) EVERY 6 HOURS PRN
Qty: 8.5 G | Refills: 2 | Status: SHIPPED | OUTPATIENT
Start: 2024-05-21

## 2024-05-21 ASSESSMENT — PATIENT HEALTH QUESTIONNAIRE - PHQ9: CLINICAL INTERPRETATION OF PHQ2 SCORE: 0

## 2024-05-21 ASSESSMENT — FIBROSIS 4 INDEX: FIB4 SCORE: 0.79

## 2024-05-24 NOTE — PROGRESS NOTES
Verbal consent was acquired by the patient to use [employer] ambient listening note generation during this visit: YES    CC: acute insomnia    Assessment & Plan:     1. Morbid obesity (HCC)  - Patient identified as having weight management issue.  Appropriate orders and counseling given.    2. Primary hypertension  Chronic, controlled with Telmisartan 40 mg qd, no s/e reported, will continue.    - telmisartan (MICARDIS) 40 MG Tab; Take 1 Tablet by mouth every day.  Dispense: 90 Tablet; Refill: 3  - CBC WITH DIFFERENTIAL; Future  - Comp Metabolic Panel; Future  - MICROALBUMIN CREAT RATIO URINE; Future    3. Prediabetes  Chronic, current taking with Metformin 500 mg BID, no s/e reported, will continue.  Last A1c was 6.1.  - metFORMIN ER (GLUCOPHAGE XR) 500 MG TABLET SR 24 HR; Take 1 Tablet by mouth 2 times a day.  Dispense: 180 Tablet; Refill: 3  - HEMOGLOBIN A1C; Future    4. Pure hypercholesterolemia  Chronic, declined pharmacotherapy.  Patient is working on diet and lifestyle modification to control this condition.  She is due to have repeat labs for reassessment.  - CBC WITH DIFFERENTIAL; Future  - Comp Metabolic Panel; Future  - Lipid Profile; Future    5. Mild persistent asthma without complication, Dr Huong Mendoza, stable, controlled with albuterol as needed.  - albuterol 108 (90 Base) MCG/ACT Aero Soln inhalation aerosol; Inhale 2 Puffs every 6 hours as needed for Shortness of Breath.  Dispense: 8.5 g; Refill: 2    6. SENTHIL on CPAP  -Continue CPAP  - Weight loss  -Avoid sedatives and alcohol    7. Acute insomnia  Patient suffers acute insomnia secondary to recent passing of her . Grief counseling provided. Will prescribe Ambien to be taken as needed for sleep.   - zolpidem (AMBIEN) 5 MG Tab; Take 1 Tablet by mouth at bedtime as needed for Sleep for up to 30 days.  Dispense: 30 Tablet; Refill: 0  - Risks, benefits, side effects, as well as potential health complications associated with Ambien were  "previously discussed with patient. Appropriate counseling provided.    - follow up PRN      Subjective:       HPI:   History of Present Illness  The patient presents for evaluation of multiple medical concerns.    The patient failed to complete her blood work prior to office visit.    The patient reports poor sleep quality, sleeping only 4 hours last night and taking a nap prior to today's visit. Her  passed away couple weeks ago. She was caring for him while he was on hospice at home. She is grieving but coping well with the loss of her . She receives great support from friends and family.     She has been prescribed metformin 500 mg, to be taken twice daily, but has only consumed it once daily.        Current Outpatient Medications:     telmisartan (MICARDIS) 40 MG Tab, Take 1 Tablet by mouth every day., Disp: 90 Tablet, Rfl: 3    metFORMIN ER (GLUCOPHAGE XR) 500 MG TABLET SR 24 HR, Take 1 Tablet by mouth 2 times a day., Disp: 180 Tablet, Rfl: 3    albuterol 108 (90 Base) MCG/ACT Aero Soln inhalation aerosol, Inhale 2 Puffs every 6 hours as needed for Shortness of Breath., Disp: 8.5 g, Rfl: 2    zolpidem (AMBIEN) 5 MG Tab, Take 1 Tablet by mouth at bedtime as needed for Sleep for up to 30 days., Disp: 30 Tablet, Rfl: 0    triamcinolone acetonide (KENALOG) 0.1 % Ointment, Apply 1 Application topically 2 times a day., Disp: 30 g, Rfl: 2    fluticasone-salmeterol (ADVAIR HFA) 45-21 MCG/ACT inhaler, Inhale 2 Puffs 2 times a day., Disp: 12 g, Rfl: 5     Objective:     Exam:  /78 (BP Location: Right arm, Patient Position: Sitting, BP Cuff Size: Adult long)   Pulse 86   Temp 36.4 °C (97.5 °F) (Temporal)   Ht 1.613 m (5' 3.5\")   Wt 100 kg (220 lb 7.4 oz)   LMP 06/04/2005 (LMP Unknown)   SpO2 95%   BMI 38.44 kg/m²  Body mass index is 38.44 kg/m².    Constitutional: awake, alert, in no distress.  Skin: Warm, dry, good turgor, no rashes, bruises, ulcers in visible areas.  Eye: conjunctiva clear, " lids neg for edema or lesions.  ENMT: TM and auditory canals wnl. Oral and nasal mucosa wnl. Lips without lesions, good dentition, oropharynx clear.  Neck: Trachea midline, no masses, no thyromegaly. No cervical or supraclavicular lymphadenopathy  Respiratory: Unlabored respiratory effort, lungs clear to auscultation, no wheezes, no rales.  Cardiovascular: Normal S1, S2, no murmur, no pedal edema.  Psych: Oriented x3, affect and mood wnl, intact judgement and insight.         Return in about 3 months (around 8/21/2024) for Multiple issues.          Please note that this dictation was created using voice recognition software. I have made every reasonable attempt to correct obvious errors, but I expect that there are errors of grammar and possibly content that I did not discover before finalizing the note.

## 2024-08-19 ENCOUNTER — HOSPITAL ENCOUNTER (EMERGENCY)
Facility: MEDICAL CENTER | Age: 74
End: 2024-08-19
Attending: EMERGENCY MEDICINE
Payer: MEDICARE

## 2024-08-19 ENCOUNTER — APPOINTMENT (OUTPATIENT)
Dept: RADIOLOGY | Facility: MEDICAL CENTER | Age: 74
End: 2024-08-19
Attending: EMERGENCY MEDICINE
Payer: MEDICARE

## 2024-08-19 VITALS
BODY MASS INDEX: 37.22 KG/M2 | WEIGHT: 218 LBS | RESPIRATION RATE: 20 BRPM | HEIGHT: 64 IN | DIASTOLIC BLOOD PRESSURE: 88 MMHG | TEMPERATURE: 98.4 F | HEART RATE: 88 BPM | OXYGEN SATURATION: 92 % | SYSTOLIC BLOOD PRESSURE: 154 MMHG

## 2024-08-19 DIAGNOSIS — J45.901 MILD ASTHMA WITH ACUTE EXACERBATION, UNSPECIFIED WHETHER PERSISTENT: ICD-10-CM

## 2024-08-19 DIAGNOSIS — J45.30 MILD PERSISTENT ASTHMA WITHOUT COMPLICATION: ICD-10-CM

## 2024-08-19 LAB — EKG IMPRESSION: NORMAL

## 2024-08-19 PROCEDURE — 94640 AIRWAY INHALATION TREATMENT: CPT

## 2024-08-19 PROCEDURE — 700101 HCHG RX REV CODE 250: Performed by: EMERGENCY MEDICINE

## 2024-08-19 PROCEDURE — 71045 X-RAY EXAM CHEST 1 VIEW: CPT

## 2024-08-19 PROCEDURE — 94760 N-INVAS EAR/PLS OXIMETRY 1: CPT

## 2024-08-19 PROCEDURE — 99284 EMERGENCY DEPT VISIT MOD MDM: CPT

## 2024-08-19 PROCEDURE — 93005 ELECTROCARDIOGRAM TRACING: CPT

## 2024-08-19 PROCEDURE — 93005 ELECTROCARDIOGRAM TRACING: CPT | Performed by: EMERGENCY MEDICINE

## 2024-08-19 PROCEDURE — 700111 HCHG RX REV CODE 636 W/ 250 OVERRIDE (IP): Performed by: EMERGENCY MEDICINE

## 2024-08-19 RX ORDER — FLUTICASONE PROPIONATE AND SALMETEROL XINAFOATE 115; 21 UG/1; UG/1
2 AEROSOL, METERED RESPIRATORY (INHALATION) 2 TIMES DAILY
Qty: 12 G | Refills: 0 | Status: SHIPPED | OUTPATIENT
Start: 2024-08-19 | End: 2024-08-22

## 2024-08-19 RX ORDER — IPRATROPIUM BROMIDE AND ALBUTEROL SULFATE 2.5; .5 MG/3ML; MG/3ML
3 SOLUTION RESPIRATORY (INHALATION)
Status: DISCONTINUED | OUTPATIENT
Start: 2024-08-19 | End: 2024-08-19

## 2024-08-19 RX ORDER — PREDNISONE 20 MG/1
20 TABLET ORAL DAILY
Qty: 5 TABLET | Refills: 0 | Status: SHIPPED | OUTPATIENT
Start: 2024-08-19 | End: 2024-08-22 | Stop reason: SDUPTHER

## 2024-08-19 RX ORDER — ALBUTEROL SULFATE 0.83 MG/ML
2.5 SOLUTION RESPIRATORY (INHALATION) EVERY 4 HOURS PRN
Qty: 15 EACH | Refills: 0 | Status: SHIPPED | OUTPATIENT
Start: 2024-08-19

## 2024-08-19 RX ORDER — IPRATROPIUM BROMIDE AND ALBUTEROL SULFATE 2.5; .5 MG/3ML; MG/3ML
3 SOLUTION RESPIRATORY (INHALATION)
Status: DISCONTINUED | OUTPATIENT
Start: 2024-08-19 | End: 2024-08-19 | Stop reason: HOSPADM

## 2024-08-19 RX ORDER — ALBUTEROL SULFATE 90 UG/1
2 AEROSOL, METERED RESPIRATORY (INHALATION) EVERY 6 HOURS PRN
Qty: 8.5 G | Refills: 0 | Status: SHIPPED | OUTPATIENT
Start: 2024-08-19

## 2024-08-19 RX ADMIN — IPRATROPIUM BROMIDE AND ALBUTEROL SULFATE 3 ML: .5; 3 SOLUTION RESPIRATORY (INHALATION) at 10:01

## 2024-08-19 RX ADMIN — IPRATROPIUM BROMIDE AND ALBUTEROL SULFATE 3 ML: .5; 3 SOLUTION RESPIRATORY (INHALATION) at 10:28

## 2024-08-19 RX ADMIN — PREDNISONE 60 MG: 50 TABLET ORAL at 09:58

## 2024-08-19 ASSESSMENT — FIBROSIS 4 INDEX: FIB4 SCORE: 0.8

## 2024-08-19 NOTE — DISCHARGE INSTRUCTIONS
You can take your albuterol inhaler or use the nebulized solution I have given you for acute asthma attack or wheezing.  Your x-ray and EKG were reassuring today.  Take the Advair twice daily regardless of whether or not you are wheezing or not.

## 2024-08-19 NOTE — ED TRIAGE NOTES
"Chief Complaint   Patient presents with    Asthma     States woke @0400 this am \"struggling to breath\"  Does wear CPAP at night  Unable to use nebulizer r/t  medications     BP (!) 154/88   Pulse 86   Temp 36.9 °C (98.4 °F) (Temporal)   Resp (!) 26   Ht 1.626 m (5' 4\")   Wt 98.9 kg (218 lb)   LMP 2005 (LMP Unknown)   SpO2 91%   BMI 37.42 kg/m²     Pt to ED via WC w/ visitor for c/o feeling increasingly short of breath, Hx of Asthma, states has gotten progressively worse since being around smoke this weekend.    Pt currently 89% on RA  Pt placed on 2L oxygen SPo2 91%.  "

## 2024-08-19 NOTE — ED PROVIDER NOTES
"ED Provider Note    CHIEF COMPLAINT  Chief Complaint   Patient presents with    Asthma     States woke @0400 this am \"struggling to breath\"  Does wear CPAP at night  Unable to use nebulizer r/t  medications       EXTERNAL RECORDS REVIEWED  Outpatient Notes most recent outpatient note with his primary care physician, patient with prediabetes, hypertension, SENTHIL and asthma.  For patient's asthma he is on rescue inhaler only.    HPI/ROS      Lauren Esquivel is a 74 y.o. female who presents with chief complaint of shortness of breath.  Patient with longstanding history of asthma.  She reports that she was burning trash yesterday and felt mildly wheezy after this.  She went to bed and then woke up in the middle of the night more short of breath with significant wheezing.  She had her nebulizer and some  albuterol which she used.  Following this she felt significantly improved but when she woke up this morning her wheezing had returned.  Patient denies any lower extremity edema.  She reports that she was able to lie down flat following the breathing treatment.  She reports some chest tightness similar to prior asthma exacerbations.  She denies any nausea or diaphoresis.    PAST MEDICAL HISTORY   has a past medical history of Anemia, Anxiety state, unspecified (1/3/2010), ASTHMA, Chronic rhinitis (1/3/2010), COPD, Elevated C-reactive protein (CRP) (2012), H/O pyelonephritis (2012), H/O: female infertility (8/10/2011), History of nephrolithiasis (2012), HTN, HTN (hypertension) (2010), Hyperglycemia (2012), Hyperlipidemia (2010), Influenza A with pneumonia (2016), Kidney filling defect (2012), Laryngeal spasm (1/3/2010), Metabolic syndrome (2012), Mycoplasma pneumonia (), Obesity (2012), Preventative health care (2010), Pyelonephritis (2012), Respiratory malfunction arising from mental factors (1/3/2010), Sleep apnea, Vitamin d deficiency " "(8/28/2010), and Wheeze (12/4/2009).    SURGICAL HISTORY   has a past surgical history that includes laparoscopy and tonsillectomy and adenoidectomy.    FAMILY HISTORY  Family History   Problem Relation Age of Onset    Lung Disease Mother     Hypertension Mother     Heart Disease Father     Hypertension Maternal Grandfather     Stroke Brother     Heart Disease Brother        SOCIAL HISTORY  Social History     Tobacco Use    Smoking status: Never    Smokeless tobacco: Never   Vaping Use    Vaping status: Never Used   Substance and Sexual Activity    Alcohol use: No     Alcohol/week: 0.0 oz    Drug use: No    Sexual activity: Yes     Partners: Male       CURRENT MEDICATIONS  Home Medications    **Home medications have not yet been reviewed for this encounter**         ALLERGIES  Allergies   Allergen Reactions    Ace Inhibitors      Cough    Ciprofloxacin     Levaquin [Levofloxacin]        PHYSICAL EXAM  VITAL SIGNS: BP (!) 154/88   Pulse 86   Temp 36.9 °C (98.4 °F) (Temporal)   Resp (!) 26   Ht 1.626 m (5' 4\")   Wt 98.9 kg (218 lb)   LMP 06/04/2005 (LMP Unknown)   SpO2 91%   BMI 37.42 kg/m²    Physical Exam  Constitutional:       Appearance: She is well-developed.   HENT:      Head: Normocephalic and atraumatic.   Eyes:      Pupils: Pupils are equal, round, and reactive to light.   Cardiovascular:      Rate and Rhythm: Normal rate and regular rhythm.   Pulmonary:      Effort: No accessory muscle usage.      Comments: Diffuse wheezing throughout, mildly tachypneic  Abdominal:      General: Bowel sounds are normal.      Palpations: Abdomen is soft. There is no mass.      Tenderness: There is no abdominal tenderness.   Musculoskeletal:         General: Normal range of motion.      Comments: No associated lower extremity edema   Skin:     General: Skin is warm.      Capillary Refill: Capillary refill takes less than 2 seconds.   Neurological:      General: No focal deficit present.      Mental Status: She is " alert.   Psychiatric:         Mood and Affect: Mood normal. Mood is not anxious.         EKG/LABS  Results for orders placed or performed during the hospital encounter of 24   EKG   Result Value Ref Range    Report       [facility] Emergency Dept[facility]    Test Date:  2024  Pt Name:    SYDNI RAPHAEL                Department: [facility]  MRN:        [id number]                      [facility]   Gender:     Female                       Technician: FLACO  :        [id number]                   Requested By:ER TRIAGE PROTOCOL  Order #:    [id number]                    Reading MD: Barry Lobato MD    Measurements  Intervals                                Axis  Rate:       83                           P:          34  CO:         192                          QRS:        14  QRSD:       87                           T:          13  QT:         376  QTc:        442    Interpretive Statements  EKG is normal sinus rhythm, normal axis normal intervals no ST changes  consistent with acute regional ischemia  Electronically Signed On 2024 09:29:57 PDT by Barry Lobato MD         I have independently interpreted this EKG    RADIOLOGY/PROCEDURES   I have independently interpreted the diagnostic imaging associated with this visit and am waiting the final reading from the radiologist.   My preliminary interpretation is as follows: Chest x-ray is unremarkable    Radiologist interpretation:  DX-CHEST-PORTABLE (1 VIEW)   Final Result         1. No acute cardiopulmonary abnormalities are identified.          COURSE & MEDICAL DECISION MAKING    ASSESSMENT, COURSE AND PLAN  Care Narrative: Well-appearing patient here with symptoms that appear most consistent with asthma exacerbation.  Screening EKG which was done in triage was very reassuring.  Patient with known exacerbate, as she was burning trash the day before.  Patient with exam consistent with asthma exacerbation.  Giving DuoNeb and prednisone.   Will reassess following.  Patient feeling much improved following DuoNeb but some wheezing persists  Following repeat DuoNeb patient satting now at 94%, lungs are clear.  She is now asymptomatic.  Work of breathing has returned to normal.  Patient discharged home with prednisone, I have refilled her Advair given her albuterol MDI and bullets for her nebulizer.            DISPOSITION AND DISCUSSIONS      FINAL DIAGNOSIS  1. Mild asthma with acute exacerbation, unspecified whether persistent    2. Mild persistent asthma without complication, Dr Borja

## 2024-08-19 NOTE — ED NOTES
RA sats 92%, verbal order OK to discharge.      Discharge instructions provided.  Pt verbalized the understanding of discharge instructions to follow up with PCP and to return to ER if condition worsens.  Pt ambulated out of ER without difficulty.

## 2024-08-19 NOTE — ED NOTES
Pt states she was burning trash on Saturday and . Pt states history of asthma. Pt states she was feeling short of breath yesterday morning, took her albuterol inhaler with some relief but her medication is . Pt states her face was swollen this morning. Pt states she has been wearing her CPAP the last couple nights . Pt states she started having chest pain at 04:00 this morning.

## 2024-08-19 NOTE — FLOWSHEET NOTE
24 1002   Events/Summary/Plan   Events/Summary/Plan Pt hx Asthma SVN tx given pt inhalers at  home / couldnt find it and wears CPAP at night   Vital Signs   Respiration (!) 22   $ Pulse Oximetry (Spot Check) Yes   Respiratory Assessment   Level of Consciousness Alert   Breath Sounds   RUL Breath Sounds Inspiratory Wheezes   RML Breath Sounds Inspiratory Wheezes   RLL Breath Sounds Diminished   JUSTICE Breath Sounds Inspiratory Wheezes   LLL Breath Sounds Diminished   Secretions   Cough Non Productive   Oxygen   O2 (LPM) 2   O2 Delivery Device Silicone Nasal Cannula

## 2024-08-21 ENCOUNTER — HOSPITAL ENCOUNTER (OUTPATIENT)
Dept: LAB | Facility: MEDICAL CENTER | Age: 74
End: 2024-08-21
Attending: FAMILY MEDICINE
Payer: MEDICARE

## 2024-08-21 DIAGNOSIS — I10 PRIMARY HYPERTENSION: ICD-10-CM

## 2024-08-21 DIAGNOSIS — R73.03 PREDIABETES: ICD-10-CM

## 2024-08-21 DIAGNOSIS — E78.00 PURE HYPERCHOLESTEROLEMIA: ICD-10-CM

## 2024-08-21 LAB
ALBUMIN SERPL BCP-MCNC: 4.3 G/DL (ref 3.2–4.9)
ALBUMIN/GLOB SERPL: 1.4 G/DL
ALP SERPL-CCNC: 54 U/L (ref 30–99)
ALT SERPL-CCNC: 50 U/L (ref 2–50)
ANION GAP SERPL CALC-SCNC: 14 MMOL/L (ref 7–16)
AST SERPL-CCNC: 33 U/L (ref 12–45)
BASOPHILS # BLD AUTO: 0.6 % (ref 0–1.8)
BASOPHILS # BLD: 0.06 K/UL (ref 0–0.12)
BILIRUB SERPL-MCNC: 0.4 MG/DL (ref 0.1–1.5)
BUN SERPL-MCNC: 21 MG/DL (ref 8–22)
CALCIUM ALBUM COR SERPL-MCNC: 9.1 MG/DL (ref 8.5–10.5)
CALCIUM SERPL-MCNC: 9.3 MG/DL (ref 8.5–10.5)
CHLORIDE SERPL-SCNC: 101 MMOL/L (ref 96–112)
CHOLEST SERPL-MCNC: 237 MG/DL (ref 100–199)
CO2 SERPL-SCNC: 24 MMOL/L (ref 20–33)
CREAT SERPL-MCNC: 0.71 MG/DL (ref 0.5–1.4)
CREAT UR-MCNC: 115.93 MG/DL
EOSINOPHIL # BLD AUTO: 0.22 K/UL (ref 0–0.51)
EOSINOPHIL NFR BLD: 2.4 % (ref 0–6.9)
ERYTHROCYTE [DISTWIDTH] IN BLOOD BY AUTOMATED COUNT: 44.5 FL (ref 35.9–50)
EST. AVERAGE GLUCOSE BLD GHB EST-MCNC: 128 MG/DL
GFR SERPLBLD CREATININE-BSD FMLA CKD-EPI: 89 ML/MIN/1.73 M 2
GLOBULIN SER CALC-MCNC: 3.1 G/DL (ref 1.9–3.5)
GLUCOSE SERPL-MCNC: 122 MG/DL (ref 65–99)
HBA1C MFR BLD: 6.1 % (ref 4–5.6)
HCT VFR BLD AUTO: 41 % (ref 37–47)
HDLC SERPL-MCNC: 72 MG/DL
HGB BLD-MCNC: 13.3 G/DL (ref 12–16)
IMM GRANULOCYTES # BLD AUTO: 0.04 K/UL (ref 0–0.11)
IMM GRANULOCYTES NFR BLD AUTO: 0.4 % (ref 0–0.9)
LDLC SERPL CALC-MCNC: 142 MG/DL
LYMPHOCYTES # BLD AUTO: 1.3 K/UL (ref 1–4.8)
LYMPHOCYTES NFR BLD: 14 % (ref 22–41)
MCH RBC QN AUTO: 28.7 PG (ref 27–33)
MCHC RBC AUTO-ENTMCNC: 32.4 G/DL (ref 32.2–35.5)
MCV RBC AUTO: 88.4 FL (ref 81.4–97.8)
MICROALBUMIN UR-MCNC: <1.2 MG/DL
MICROALBUMIN/CREAT UR: NORMAL MG/G (ref 0–30)
MONOCYTES # BLD AUTO: 0.34 K/UL (ref 0–0.85)
MONOCYTES NFR BLD AUTO: 3.7 % (ref 0–13.4)
NEUTROPHILS # BLD AUTO: 7.3 K/UL (ref 1.82–7.42)
NEUTROPHILS NFR BLD: 78.9 % (ref 44–72)
NRBC # BLD AUTO: 0 K/UL
NRBC BLD-RTO: 0 /100 WBC (ref 0–0.2)
PLATELET # BLD AUTO: 314 K/UL (ref 164–446)
PMV BLD AUTO: 10.4 FL (ref 9–12.9)
POTASSIUM SERPL-SCNC: 4.1 MMOL/L (ref 3.6–5.5)
PROT SERPL-MCNC: 7.4 G/DL (ref 6–8.2)
RBC # BLD AUTO: 4.64 M/UL (ref 4.2–5.4)
SODIUM SERPL-SCNC: 139 MMOL/L (ref 135–145)
TRIGL SERPL-MCNC: 113 MG/DL (ref 0–149)
WBC # BLD AUTO: 9.3 K/UL (ref 4.8–10.8)

## 2024-08-21 PROCEDURE — 85025 COMPLETE CBC W/AUTO DIFF WBC: CPT

## 2024-08-21 PROCEDURE — 80061 LIPID PANEL: CPT

## 2024-08-21 PROCEDURE — 82043 UR ALBUMIN QUANTITATIVE: CPT

## 2024-08-21 PROCEDURE — 83036 HEMOGLOBIN GLYCOSYLATED A1C: CPT | Mod: GA

## 2024-08-21 PROCEDURE — 36415 COLL VENOUS BLD VENIPUNCTURE: CPT

## 2024-08-21 PROCEDURE — 82570 ASSAY OF URINE CREATININE: CPT

## 2024-08-21 PROCEDURE — 80053 COMPREHEN METABOLIC PANEL: CPT

## 2024-08-22 ENCOUNTER — OFFICE VISIT (OUTPATIENT)
Dept: MEDICAL GROUP | Facility: MEDICAL CENTER | Age: 74
End: 2024-08-22
Payer: MEDICARE

## 2024-08-22 VITALS
HEIGHT: 64 IN | SYSTOLIC BLOOD PRESSURE: 130 MMHG | TEMPERATURE: 98.2 F | OXYGEN SATURATION: 94 % | WEIGHT: 221 LBS | BODY MASS INDEX: 37.73 KG/M2 | DIASTOLIC BLOOD PRESSURE: 74 MMHG | HEART RATE: 71 BPM

## 2024-08-22 DIAGNOSIS — J45.30 MILD PERSISTENT ASTHMA WITHOUT COMPLICATION: ICD-10-CM

## 2024-08-22 DIAGNOSIS — G47.33 OSA ON CPAP: ICD-10-CM

## 2024-08-22 DIAGNOSIS — I10 PRIMARY HYPERTENSION: ICD-10-CM

## 2024-08-22 DIAGNOSIS — R73.03 PREDIABETES: ICD-10-CM

## 2024-08-22 DIAGNOSIS — Z12.11 COLON CANCER SCREENING: ICD-10-CM

## 2024-08-22 DIAGNOSIS — Z65.9 OTHER SOCIAL STRESSOR: ICD-10-CM

## 2024-08-22 DIAGNOSIS — E78.00 PURE HYPERCHOLESTEROLEMIA: ICD-10-CM

## 2024-08-22 DIAGNOSIS — J45.901 EXACERBATION OF ASTHMA, UNSPECIFIED ASTHMA SEVERITY, UNSPECIFIED WHETHER PERSISTENT: ICD-10-CM

## 2024-08-22 PROCEDURE — 99214 OFFICE O/P EST MOD 30 MIN: CPT | Performed by: FAMILY MEDICINE

## 2024-08-22 PROCEDURE — 3078F DIAST BP <80 MM HG: CPT | Performed by: FAMILY MEDICINE

## 2024-08-22 PROCEDURE — 3075F SYST BP GE 130 - 139MM HG: CPT | Performed by: FAMILY MEDICINE

## 2024-08-22 RX ORDER — PREDNISONE 20 MG/1
20 TABLET ORAL DAILY
Qty: 5 TABLET | Refills: 0 | Status: SHIPPED | OUTPATIENT
Start: 2024-08-22 | End: 2024-08-27

## 2024-08-22 RX ORDER — FLUTICASONE PROPIONATE AND SALMETEROL 250; 50 UG/1; UG/1
1 POWDER RESPIRATORY (INHALATION) EVERY 12 HOURS
Qty: 60 EACH | Refills: 5 | Status: SHIPPED | OUTPATIENT
Start: 2024-08-22

## 2024-08-22 RX ORDER — ROSUVASTATIN CALCIUM 20 MG/1
20 TABLET, COATED ORAL EVERY EVENING
Qty: 90 TABLET | Refills: 3 | Status: SHIPPED | OUTPATIENT
Start: 2024-08-22

## 2024-08-22 ASSESSMENT — FIBROSIS 4 INDEX: FIB4 SCORE: 1.11

## 2024-08-25 NOTE — PROGRESS NOTES
Verbal consent was acquired by the patient to use [employer] ambient listening note generation during this visit: YES    CC: asthma exacerbation     Assessment & Plan:     1. SENTHIL on CPAP  Chronic, uses CPAP nightly. Her prior sleep doctor has retired. She needs referral to establish care with new sleep doctor.   - continue CPAP   - avoid sedatives, alcohol.   - Referral to Pulmonary and Sleep Medicine    2. Mild persistent asthma without complication  3. Exacerbation of asthma, unspecified asthma severity, unspecified whether persistent  Chronic, mild persistent asthma, previously controlled with Advair 115-21 and albuterol PRN. She recently was seen by ER for acute exacerbation. She was discharged with Prednisone 20 mg qd x 5 days. Her symptoms are improving. However, she still has wheezing and mild breathing difficulty. She uses left-over Advair 250-50 mcg/act BID from her   with significant improvement. She believes that her insurance does not cover Advair. She continues to use albuterol 4 times daily for SOB. However, albuterol causes worsening anxiety and tremors.  She is afebrile, hemodynamically stable, sat 94% on room air, lung exam positive for mild wheezes in all lung field.   - fluticasone-salmeterol (WIXELA INHUB) 250-50 MCG/ACT AEROSOL POWDER, BREATH ACTIVATED; Inhale 1 Puff every 12 hours.  Dispense: 60 Each; Refill: 5  - predniSONE (DELTASONE) 20 MG Tab; Take 1 Tablet by mouth every day for 5 days.  Dispense: 5 Tablet; Refill: 0  - albuterol Q6H PRN   - ER precautions discussed.     4. Pure hypercholesterolemia  Chronic, previously controlled with Crestor 20 mg qd. Unfortunately, she stopped Crestor a few weeks ago as she ran out of medication. Recent labs showed elevated total cholesterol and LDL.     - restart rosuvastatin (CRESTOR) 20 MG Tab; Take 1 Tablet by mouth every evening.  Dispense: 90 Tablet; Refill: 3  - Lipid Profile; Future  - Comp Metabolic Panel; Future  - dietary  modification, regular exercise  - weight loss   - avoid alcohol, illicit drugs, tobacco products     5. Prediabetes  Recent A1C was 6.1, and stable.   She is taking metformin 600 mg BID. No side effects reported.   - HEMOGLOBIN A1C; Future    6. Colon cancer screening  - ALFREDITO (FIT DNA)    7. Primary hypertension  Chronic, controlled with Telmisartan 40 mg qd, no s/e reported, will continue.      8. Other social stressor  Patient complains of increased social stress since her   a few months ago. She now discovers that her  left behind unpaid bills and taxes. This has caused significant distress to her. She is trying to get help from [facility] .   - stress management discussed  - recommended exercises as tolerated  - community resources discussed.         Subjective:       HPI:   History of Present Illness  The patient presents for evaluation of multiple medical concerns.    She experienced an asthma attack after burning trash over the weekend, which led to her waking up in the ER on Monday. Despite being prescribed prednisone, her condition has not improved much. She is seeking a referral to a pulmonologist as her previous one has closed his practice. Albuterol causes her to feel nervous and increased tremors. She uses left-over Advair from her  , which has been helping significantly with her symptoms. She does not think that her insurance will cover Advair. She wishes to try Wixela instead.     She used CPAP regularly. She also mentions that her sleep doctor has left. She needs referral to establish care with new doctor.        She ran out of her cholesterol medication and did not refill the prescription. She continues to take metformin and anti-hypertensive medications. Her diet is poor since her  passed away due to increased social stress. She realized that her  left behind a mountain of unpaid bills including taxes for several years. Her  was in mental  "and cognitive decline toward the end of his life leading to neglects of these matter. She is trying to deal with it the best she can. However, it causes significant distress to her. She gets help from her children and family. She is seeking help from local  as well.          Current Outpatient Medications:     fluticasone-salmeterol (WIXELA INHUB) 250-50 MCG/ACT AEROSOL POWDER, BREATH ACTIVATED, Inhale 1 Puff every 12 hours., Disp: 60 Each, Rfl: 5    predniSONE (DELTASONE) 20 MG Tab, Take 1 Tablet by mouth every day for 5 days., Disp: 5 Tablet, Rfl: 0    rosuvastatin (CRESTOR) 20 MG Tab, Take 1 Tablet by mouth every evening., Disp: 90 Tablet, Rfl: 3    albuterol 108 (90 Base) MCG/ACT Aero Soln inhalation aerosol, Inhale 2 Puffs every 6 hours as needed for Shortness of Breath., Disp: 8.5 g, Rfl: 0    albuterol (PROVENTIL) 2.5mg/3ml Nebu Soln solution for nebulization, Take 3 mL by nebulization every four hours as needed for Shortness of Breath., Disp: 15 Each, Rfl: 0    telmisartan (MICARDIS) 40 MG Tab, Take 1 Tablet by mouth every day., Disp: 90 Tablet, Rfl: 3    metFORMIN ER (GLUCOPHAGE XR) 500 MG TABLET SR 24 HR, Take 1 Tablet by mouth 2 times a day., Disp: 180 Tablet, Rfl: 3    triamcinolone acetonide (KENALOG) 0.1 % Ointment, Apply 1 Application topically 2 times a day., Disp: 30 g, Rfl: 2     Objective:     Exam:  /74 (BP Location: Left arm, Patient Position: Sitting, BP Cuff Size: Adult)   Pulse 71   Temp 36.8 °C (98.2 °F) (Temporal)   Ht 1.626 m (5' 4\")   Wt 100 kg (221 lb)   LMP 06/04/2005 (LMP Unknown)   SpO2 94%   BMI 37.93 kg/m²  Body mass index is 37.93 kg/m².    Constitutional: awake, alert, in no distress.  Skin: Warm, dry, good turgor, no rashes, bruises, ulcers in visible areas.  Eye: conjunctiva clear, lids neg for edema or lesions.  Neck: Trachea midline, no masses, no thyromegaly. No cervical or supraclavicular lymphadenopathy  Respiratory: Unlabored respiratory effort, lungs " clear to auscultation, no wheezes, no rales.  Cardiovascular: Normal S1, S2, no murmur, no pedal edema.  Psych: Oriented x3, affect and mood wnl, intact judgement and insight.         Return in about 3 months (around 11/22/2024) for Multiple issues.          Please note that this dictation was created using voice recognition software. I have made every reasonable attempt to correct obvious errors, but I expect that there are errors of grammar and possibly content that I did not discover before finalizing the note.

## 2024-08-27 ENCOUNTER — TELEPHONE (OUTPATIENT)
Dept: MEDICAL GROUP | Facility: MEDICAL CENTER | Age: 74
End: 2024-08-27
Payer: MEDICARE

## 2024-09-24 ENCOUNTER — TELEPHONE (OUTPATIENT)
Dept: HEALTH INFORMATION MANAGEMENT | Facility: OTHER | Age: 74
End: 2024-09-24
Payer: MEDICARE

## 2024-11-25 ENCOUNTER — HOSPITAL ENCOUNTER (OUTPATIENT)
Dept: LAB | Facility: MEDICAL CENTER | Age: 74
End: 2024-11-25
Attending: FAMILY MEDICINE
Payer: MEDICARE

## 2024-11-25 DIAGNOSIS — E78.00 PURE HYPERCHOLESTEROLEMIA: ICD-10-CM

## 2024-11-25 DIAGNOSIS — R73.03 PREDIABETES: ICD-10-CM

## 2024-11-25 LAB
ALBUMIN SERPL BCP-MCNC: 4.1 G/DL (ref 3.2–4.9)
ALBUMIN/GLOB SERPL: 1.4 G/DL
ALP SERPL-CCNC: 48 U/L (ref 30–99)
ALT SERPL-CCNC: 34 U/L (ref 2–50)
ANION GAP SERPL CALC-SCNC: 13 MMOL/L (ref 7–16)
AST SERPL-CCNC: 26 U/L (ref 12–45)
BILIRUB SERPL-MCNC: 0.3 MG/DL (ref 0.1–1.5)
BUN SERPL-MCNC: 18 MG/DL (ref 8–22)
CALCIUM ALBUM COR SERPL-MCNC: 9 MG/DL (ref 8.5–10.5)
CALCIUM SERPL-MCNC: 9.1 MG/DL (ref 8.5–10.5)
CHLORIDE SERPL-SCNC: 105 MMOL/L (ref 96–112)
CHOLEST SERPL-MCNC: 126 MG/DL (ref 100–199)
CO2 SERPL-SCNC: 22 MMOL/L (ref 20–33)
CREAT SERPL-MCNC: 0.62 MG/DL (ref 0.5–1.4)
EST. AVERAGE GLUCOSE BLD GHB EST-MCNC: 126 MG/DL
GFR SERPLBLD CREATININE-BSD FMLA CKD-EPI: 93 ML/MIN/1.73 M 2
GLOBULIN SER CALC-MCNC: 2.9 G/DL (ref 1.9–3.5)
GLUCOSE SERPL-MCNC: 99 MG/DL (ref 65–99)
HBA1C MFR BLD: 6 % (ref 4–5.6)
HDLC SERPL-MCNC: 66 MG/DL
LDLC SERPL CALC-MCNC: 46 MG/DL
POTASSIUM SERPL-SCNC: 3.8 MMOL/L (ref 3.6–5.5)
PROT SERPL-MCNC: 7 G/DL (ref 6–8.2)
SODIUM SERPL-SCNC: 140 MMOL/L (ref 135–145)
TRIGL SERPL-MCNC: 72 MG/DL (ref 0–149)

## 2024-11-25 PROCEDURE — 80061 LIPID PANEL: CPT

## 2024-11-25 PROCEDURE — 36415 COLL VENOUS BLD VENIPUNCTURE: CPT

## 2024-11-25 PROCEDURE — 83036 HEMOGLOBIN GLYCOSYLATED A1C: CPT | Mod: GA

## 2024-11-25 PROCEDURE — 80053 COMPREHEN METABOLIC PANEL: CPT

## 2024-11-26 ENCOUNTER — OFFICE VISIT (OUTPATIENT)
Dept: MEDICAL GROUP | Facility: MEDICAL CENTER | Age: 74
End: 2024-11-26
Payer: MEDICARE

## 2024-11-26 VITALS
WEIGHT: 221.34 LBS | SYSTOLIC BLOOD PRESSURE: 122 MMHG | HEART RATE: 75 BPM | BODY MASS INDEX: 37.79 KG/M2 | HEIGHT: 64 IN | DIASTOLIC BLOOD PRESSURE: 70 MMHG | TEMPERATURE: 98.2 F | OXYGEN SATURATION: 97 %

## 2024-11-26 DIAGNOSIS — G47.33 OSA ON CPAP: ICD-10-CM

## 2024-11-26 DIAGNOSIS — E66.01 MORBID OBESITY (HCC): ICD-10-CM

## 2024-11-26 DIAGNOSIS — J45.30 MILD PERSISTENT ASTHMA WITHOUT COMPLICATION: ICD-10-CM

## 2024-11-26 DIAGNOSIS — R73.03 PREDIABETES: ICD-10-CM

## 2024-11-26 DIAGNOSIS — Z23 NEED FOR VACCINATION: ICD-10-CM

## 2024-11-26 DIAGNOSIS — E78.00 PURE HYPERCHOLESTEROLEMIA: ICD-10-CM

## 2024-11-26 DIAGNOSIS — I10 PRIMARY HYPERTENSION: ICD-10-CM

## 2024-11-26 PROBLEM — E66.9 OBESITY (BMI 30-39.9): Status: RESOLVED | Noted: 2024-05-21 | Resolved: 2024-11-26

## 2024-11-26 ASSESSMENT — FIBROSIS 4 INDEX: FIB4 SCORE: 1.07

## 2024-11-29 NOTE — PROGRESS NOTES
Verbal consent was acquired by the patient to use [employer] ambient listening note generation during this visit: YES    CC: labs     Assessment & Plan:     1. Morbid obesity (HCC)  Body mass index is 37.99 kg/m².   - Patient identified as having weight management issue.  Appropriate orders and counseling given.     2. Prediabetes  Recent A1c was 6.0, improved from 6.1 a few months ago.  -Continue metformin 500 mg twice daily  - Dietary/lifestyle modification and weight loss    - HEMOGLOBIN A1C; Future    3. Primary hypertension  Chronic, controlled with telmisartan 40 mg daily, no s/e reported, will continue.    - CBC WITH DIFFERENTIAL; Future  - Comp Metabolic Panel; Future  - MICROALBUMIN CREAT RATIO URINE; Future    4. Pure hypercholesterolemia  Chronic, controlled with Crestor 20 mg daily, no s/e reported, will continue.    - Lipid Profile; Future    5. Mild persistent asthma without complication, Dr Broja  Chronic, controlled with Wixela and albuterol as needed, no s/e reported, will continue.      6. Need for vaccination  - INFLUENZA VACCINE, HIGH DOSE (65+ ONLY)    7. SENTHIL on CPAP  -Continue CPAP and follow-up with sleep medicine as directed.  -Weight loss  -Avoid alcohol and sedatives      Subjective:       HPI:     History of Present Illness  The patient presents for a routine checkup.    Patient has chronic prediabetes.  Her A1c improved compared to previous labs.  She is currently on metformin, taken twice daily. Her diet has not been optimal due to stress.      She missed her appointment with the pulmonologist and is requesting a new one. She is also interested in discussing her asthma management. She had a blood test conducted yesterday.    She is currently taking Crestor 20 mg daily.  Her recent lipid profile showed a significant improvement from prior labs.  She denies any side effect with Crestor.  She is pleased with the outcome of the treatment.    She continues to take telmisartan 40 mg daily for  "hypertension.        Current Outpatient Medications:     fluticasone-salmeterol (WIXELA INHUB) 250-50 MCG/ACT AEROSOL POWDER, BREATH ACTIVATED, Inhale 1 Puff every 12 hours., Disp: 60 Each, Rfl: 5    rosuvastatin (CRESTOR) 20 MG Tab, Take 1 Tablet by mouth every evening., Disp: 90 Tablet, Rfl: 3    albuterol 108 (90 Base) MCG/ACT Aero Soln inhalation aerosol, Inhale 2 Puffs every 6 hours as needed for Shortness of Breath., Disp: 8.5 g, Rfl: 0    albuterol (PROVENTIL) 2.5mg/3ml Nebu Soln solution for nebulization, Take 3 mL by nebulization every four hours as needed for Shortness of Breath., Disp: 15 Each, Rfl: 0    telmisartan (MICARDIS) 40 MG Tab, Take 1 Tablet by mouth every day., Disp: 90 Tablet, Rfl: 3    metFORMIN ER (GLUCOPHAGE XR) 500 MG TABLET SR 24 HR, Take 1 Tablet by mouth 2 times a day., Disp: 180 Tablet, Rfl: 3    triamcinolone acetonide (KENALOG) 0.1 % Ointment, Apply 1 Application topically 2 times a day., Disp: 30 g, Rfl: 2     Objective:     Exam:  /70 (BP Location: Right arm, Patient Position: Sitting, BP Cuff Size: Large adult)   Pulse 75   Temp 36.8 °C (98.2 °F) (Temporal)   Ht 1.626 m (5' 4\")   Wt 100 kg (221 lb 5.5 oz)   LMP 06/04/2005 (LMP Unknown)   SpO2 97%   BMI 37.99 kg/m²  Body mass index is 37.99 kg/m².    Constitutional: awake, alert, in no distress.  Skin: Warm, dry, good turgor, no rashes, bruises, ulcers in visible areas.  Eye: conjunctiva clear, lids neg for edema or lesions.  Respiratory: Unlabored respiratory effort, lungs clear to auscultation, no wheezes, no rales.  Cardiovascular: Normal S1, S2, no murmur, no pedal edema.  Psych: Oriented x3, affect and mood wnl, intact judgement and insight.         Return in about 6 months (around 5/26/2025) for Multiple issues.          Please note that this dictation was created using voice recognition software. I have made every reasonable attempt to correct obvious errors, but I expect that there are errors of grammar and " possibly content that I did not discover before finalizing the note.

## 2025-02-23 ENCOUNTER — OFFICE VISIT (OUTPATIENT)
Dept: URGENT CARE | Facility: CLINIC | Age: 75
End: 2025-02-23
Payer: MEDICARE

## 2025-02-23 VITALS
HEART RATE: 78 BPM | TEMPERATURE: 97.8 F | WEIGHT: 210 LBS | OXYGEN SATURATION: 99 % | RESPIRATION RATE: 19 BRPM | BODY MASS INDEX: 35.85 KG/M2 | SYSTOLIC BLOOD PRESSURE: 124 MMHG | HEIGHT: 64 IN | DIASTOLIC BLOOD PRESSURE: 74 MMHG

## 2025-02-23 DIAGNOSIS — J45.901 ASTHMA WITH ACUTE EXACERBATION, UNSPECIFIED ASTHMA SEVERITY, UNSPECIFIED WHETHER PERSISTENT: Primary | ICD-10-CM

## 2025-02-23 DIAGNOSIS — R05.1 ACUTE COUGH: ICD-10-CM

## 2025-02-23 PROCEDURE — 99214 OFFICE O/P EST MOD 30 MIN: CPT

## 2025-02-23 PROCEDURE — 3074F SYST BP LT 130 MM HG: CPT

## 2025-02-23 PROCEDURE — 3078F DIAST BP <80 MM HG: CPT

## 2025-02-23 RX ORDER — BENZONATATE 100 MG/1
100 CAPSULE ORAL 3 TIMES DAILY PRN
Qty: 60 CAPSULE | Refills: 0 | Status: SHIPPED | OUTPATIENT
Start: 2025-02-23

## 2025-02-23 RX ORDER — PREDNISONE 10 MG/1
40 TABLET ORAL DAILY
Qty: 20 TABLET | Refills: 0 | Status: SHIPPED | OUTPATIENT
Start: 2025-02-23 | End: 2025-02-28

## 2025-02-23 RX ORDER — ALBUTEROL SULFATE 90 UG/1
2 INHALANT RESPIRATORY (INHALATION) EVERY 6 HOURS PRN
Qty: 1 EACH | Refills: 1 | Status: SHIPPED | OUTPATIENT
Start: 2025-02-23

## 2025-02-23 RX ORDER — DOXYCYCLINE HYCLATE 100 MG
100 TABLET ORAL 2 TIMES DAILY
Qty: 14 TABLET | Refills: 0 | Status: SHIPPED | OUTPATIENT
Start: 2025-02-23 | End: 2025-03-02

## 2025-02-23 ASSESSMENT — FIBROSIS 4 INDEX: FIB4 SCORE: 1.07

## 2025-02-24 NOTE — PROGRESS NOTES
Subjective:   Lauren Esquivel is a 74 y.o. female who presents for cough, SOB, wheezing x 7 days          I introduced myself to the patient and informed them that I am a Family Nurse Practitioner.    HPI:Lauren is a 74 year-old female  with a hx of asthma, SENTHIL on CPAP who comes in today c/o persistent productive cough, SOB, wheezing, chest congestion. Onset was she states it started as a cold/viral URI type illness over a week ago, the symptoms have resolved, however she is at increased coughing, shortness of breath, and wheezing for the last 3 days.  Patient describes symptoms as constant. They describe the cough as productive with yellow. Aggravating factors include worse at night, worse when lying flat. Relieving factors include sleeping propped up. Treatments tried at home include  Dayquil, Niquil with minimal effect . They describe their symptoms as moderate.  Denies any known exposure to Covid, Flu, RSV, strep. Denies anyone else is sick at home presently. Denies any history of asthma, COPD, pneumonia.       Review of Systems   Constitutional:  Positive for malaise/fatigue. Negative for chills and fever.   HENT:  Negative for congestion, ear pain and sore throat.    Eyes:  Negative for pain, discharge and redness.   Respiratory:  Positive for cough, sputum production, shortness of breath and wheezing. Negative for hemoptysis and stridor.    Cardiovascular:  Negative for chest pain and palpitations.   Gastrointestinal:  Negative for abdominal pain, diarrhea, nausea and vomiting.   Genitourinary:  Negative for dysuria.   Musculoskeletal:  Negative for myalgias.   Skin:  Negative for rash.   Neurological:  Negative for dizziness and headaches.       Medications: albuterol Aers  albuterol Nebu  fluticasone-salmeterol Aepb  metFORMIN ER Tb24  rosuvastatin Tabs  telmisartan Tabs  triamcinolone acetonide Oint     Allergies: Ace inhibitors, Ciprofloxacin, and Levaquin     Problem List: does not have any  "pertinent problems on file.    Surgical History:  Past Surgical History:   Procedure Laterality Date    LAPAROSCOPY      for infertility    TONSILLECTOMY AND ADENOIDECTOMY         Past Social Hx:   reports that she has never smoked. She has never used smokeless tobacco. She reports that she does not drink alcohol and does not use drugs.     Past Family Hx:   family history includes Heart Disease in her brother and father; Hypertension in her maternal grandfather and mother; Lung Disease in her mother; Stroke in her brother.     Problem list, medications, and allergies reviewed by myself today in Epic.   I have documented what I find to be significant in regards to past medical, social, family and surgical history  in my HPI or under PMH/PSH/FH review section, otherwise it is noncontributory     Objective:     /74   Pulse 78   Temp 36.6 °C (97.8 °F) (Temporal)   Resp 19   Ht 1.626 m (5' 4\")   Wt 95.3 kg (210 lb)   LMP 06/04/2005 (LMP Unknown)   SpO2 99%   BMI 36.05 kg/m²     During this visit, appropriate PPE was worn, and hand hygiene was performed.    Physical Exam  Vitals reviewed.   Constitutional:       General: She is not in acute distress.     Appearance: Normal appearance. She is not ill-appearing or toxic-appearing.   HENT:      Head: Normocephalic and atraumatic.      Right Ear: Tympanic membrane, ear canal and external ear normal. There is no impacted cerumen.      Left Ear: Tympanic membrane, ear canal and external ear normal. There is no impacted cerumen.      Nose: No congestion or rhinorrhea.      Mouth/Throat:      Pharynx: Oropharynx is clear. No oropharyngeal exudate or posterior oropharyngeal erythema.   Eyes:      General: No scleral icterus.        Right eye: No discharge.         Left eye: No discharge.      Conjunctiva/sclera: Conjunctivae normal.      Pupils: Pupils are equal, round, and reactive to light.   Cardiovascular:      Rate and Rhythm: Normal rate and regular rhythm.    "   Heart sounds: Murmur heard.      No friction rub. No gallop.   Pulmonary:      Effort: Pulmonary effort is normal. No tachypnea, accessory muscle usage, respiratory distress or retractions.      Breath sounds: No stridor. Examination of the right-upper field reveals wheezing and rhonchi. Examination of the left-upper field reveals wheezing and rhonchi. Examination of the right-middle field reveals wheezing and rhonchi. Examination of the left-middle field reveals wheezing and rhonchi. Wheezing and rhonchi present. No decreased breath sounds or rales.   Abdominal:      General: There is no distension.   Musculoskeletal:         General: Normal range of motion.      Cervical back: Normal range of motion. No rigidity.      Right lower leg: No edema.      Left lower leg: No edema.   Lymphadenopathy:      Cervical: No cervical adenopathy.   Skin:     General: Skin is warm and dry.      Coloration: Skin is not jaundiced.   Neurological:      General: No focal deficit present.      Mental Status: She is alert and oriented to person, place, and time. Mental status is at baseline.   Psychiatric:         Mood and Affect: Mood normal.         Behavior: Behavior normal.         Thought Content: Thought content normal.         Judgment: Judgment normal.         Assessment/Plan:     Diagnosis and associated orders:     1. Asthma with acute exacerbation, unspecified asthma severity, unspecified whether persistent  doxycycline (VIBRAMYCIN) 100 MG Tab    benzonatate (TESSALON) 100 MG Cap    predniSONE (DELTASONE) 10 MG Tab    albuterol 108 (90 Base) MCG/ACT Aero Soln inhalation aerosol      2. Acute cough  benzonatate (TESSALON) 100 MG Cap    predniSONE (DELTASONE) 10 MG Tab         Comments/MDM:     1. Asthma with acute exacerbation, unspecified asthma severity, unspecified whether persistent (Primary)  Patient's presentation and symptoms as well as physical exam are consistent with post viral URI acute exacerbation of  asthma/bronchitis, secondary bacterial URI.  She is afebrile in clinic today, O2 sat is 99% on room air which is reassuring, low suspicion for pneumonia at this point, discussed this with patient, she agrees to defer chest x-ray at this time.  discussed with patient Dx,  DDx, management options (risks,benefits, and alternatives to planned treatment), natural progression.   Supportive care measures were discussed.   Questions were encouraged and answered.   Written information was provided and I did go over this with the patient in clinic today.  Instructed patient regarding red flags and to return to urgent care prn if new or worsening sx or if there is no improvement in condition prn.    Advised the patient to follow-up with the primary care physician for recheck, reevaluation, and consideration of further management.  I did instruct patient regarding medications prescribed, purpose, side effects, precautions.  Instructed patient to get a pharmacy consult when picking up any prescribed medications.  Patient was instructed regarding prednisone, purpose, side effects, precautions, include avoid using any other NSAIDs while taking prednisone, take it first thing in the morning to avoid potential sleeplessness/insomnia, take with food to prevent GI upset.     Strict ER precautions discussed for any worsening symptoms, fever, chills, nausea or vomiting, lethargy   Patient states they have good understanding and they are agreeable with the plan of care.     - doxycycline (VIBRAMYCIN) 100 MG Tab; Take 1 Tablet by mouth 2 times a day for 7 days.  Dispense: 14 Tablet; Refill: 0  - benzonatate (TESSALON) 100 MG Cap; Take 1 Capsule by mouth 3 times a day as needed for Cough.  Dispense: 60 Capsule; Refill: 0  - predniSONE (DELTASONE) 10 MG Tab; Take 4 Tablets by mouth every day for 5 days.  Dispense: 20 Tablet; Refill: 0  - albuterol 108 (90 Base) MCG/ACT Aero Soln inhalation aerosol; Inhale 2 Puffs every 6 hours as needed for  Shortness of Breath.  Dispense: 1 Each; Refill: 1    2. Acute cough  - benzonatate (TESSALON) 100 MG Cap; Take 1 Capsule by mouth 3 times a day as needed for Cough.  Dispense: 60 Capsule; Refill: 0  - predniSONE (DELTASONE) 10 MG Tab; Take 4 Tablets by mouth every day for 5 days.  Dispense: 20 Tablet; Refill: 0          Pt is clinically stable at today's acute urgent care visit. Vital signs are normal and reassuring.  No acute distress noted. Appropriate for outpatient management at this time.        I personally reviewed prior external notes and test results pertinent to today's visit.  I have independently reviewed and interpreted all diagnostics ordered during this urgent care acute visit.        Please note that this dictation was created using voice recognition software. I have made a reasonable attempt to correct obvious errors, but I expect that there are errors of grammar and possibly content that I did not discover before finalizing the note.    This note was electronically signed by Hay BARAHONA, DORIS, CHAYO, ILENE

## 2025-02-26 ASSESSMENT — ENCOUNTER SYMPTOMS
HEMOPTYSIS: 0
DIARRHEA: 0
EYE PAIN: 0
EYE REDNESS: 0
NAUSEA: 0
MYALGIAS: 0
PALPITATIONS: 0
HEADACHES: 0
ABDOMINAL PAIN: 0
WHEEZING: 1
SORE THROAT: 0
CHILLS: 0
SPUTUM PRODUCTION: 1
DIZZINESS: 0
STRIDOR: 0
FEVER: 0
VOMITING: 0
SHORTNESS OF BREATH: 1
COUGH: 1
EYE DISCHARGE: 0

## 2025-03-07 ENCOUNTER — APPOINTMENT (OUTPATIENT)
Dept: RADIOLOGY | Facility: IMAGING CENTER | Age: 75
End: 2025-03-07
Attending: STUDENT IN AN ORGANIZED HEALTH CARE EDUCATION/TRAINING PROGRAM
Payer: MEDICARE

## 2025-03-07 ENCOUNTER — OFFICE VISIT (OUTPATIENT)
Dept: URGENT CARE | Facility: CLINIC | Age: 75
End: 2025-03-07
Payer: MEDICARE

## 2025-03-07 VITALS
BODY MASS INDEX: 35.85 KG/M2 | SYSTOLIC BLOOD PRESSURE: 126 MMHG | RESPIRATION RATE: 19 BRPM | OXYGEN SATURATION: 93 % | HEART RATE: 74 BPM | WEIGHT: 210 LBS | TEMPERATURE: 98.3 F | DIASTOLIC BLOOD PRESSURE: 82 MMHG | HEIGHT: 64 IN

## 2025-03-07 DIAGNOSIS — R06.02 SOB (SHORTNESS OF BREATH): ICD-10-CM

## 2025-03-07 DIAGNOSIS — J98.8 RTI (RESPIRATORY TRACT INFECTION): ICD-10-CM

## 2025-03-07 DIAGNOSIS — J45.901 EXACERBATION OF ASTHMA, UNSPECIFIED ASTHMA SEVERITY, UNSPECIFIED WHETHER PERSISTENT: ICD-10-CM

## 2025-03-07 DIAGNOSIS — R05.9 COUGH, UNSPECIFIED TYPE: ICD-10-CM

## 2025-03-07 DIAGNOSIS — J98.11 ATELECTASIS OF LEFT LUNG: ICD-10-CM

## 2025-03-07 PROCEDURE — 99214 OFFICE O/P EST MOD 30 MIN: CPT | Performed by: STUDENT IN AN ORGANIZED HEALTH CARE EDUCATION/TRAINING PROGRAM

## 2025-03-07 PROCEDURE — 71046 X-RAY EXAM CHEST 2 VIEWS: CPT | Mod: TC,FY | Performed by: STUDENT IN AN ORGANIZED HEALTH CARE EDUCATION/TRAINING PROGRAM

## 2025-03-07 PROCEDURE — 3074F SYST BP LT 130 MM HG: CPT | Performed by: STUDENT IN AN ORGANIZED HEALTH CARE EDUCATION/TRAINING PROGRAM

## 2025-03-07 PROCEDURE — 3079F DIAST BP 80-89 MM HG: CPT | Performed by: STUDENT IN AN ORGANIZED HEALTH CARE EDUCATION/TRAINING PROGRAM

## 2025-03-07 RX ORDER — METHYLPREDNISOLONE 4 MG/1
TABLET ORAL
Qty: 21 TABLET | Refills: 0 | Status: SHIPPED | OUTPATIENT
Start: 2025-03-07

## 2025-03-07 ASSESSMENT — ENCOUNTER SYMPTOMS
PALPITATIONS: 0
SORE THROAT: 0
VOMITING: 0
ORTHOPNEA: 0
FEVER: 0
CONSTIPATION: 0
SHORTNESS OF BREATH: 1
NAUSEA: 0
ABDOMINAL PAIN: 0
WHEEZING: 1
COUGH: 1
HEMOPTYSIS: 0
CHILLS: 0
DIARRHEA: 0
SPUTUM PRODUCTION: 1

## 2025-03-07 ASSESSMENT — FIBROSIS 4 INDEX: FIB4 SCORE: 1.07

## 2025-03-08 NOTE — PROGRESS NOTES
"Subjective     Lauren Karolyn Esquivel is a 74 y.o. female who presents with Congestion            Lauren is a 74 y.o. female who presents to urgent care with cough and chest congestion.  Patient was seen in urgent care 12 days ago for similar symptoms.  She was prescribed prednisone and antibiotics which she completed as prescribed.  She states she is feeling somewhat better but still is experiencing cough and congestion.  She states cough is worse at nighttime and has had to sleep propped up.  Cough is productive in nature and reports green sputum. She has been feeling fatigued.  She does report shortness of breath/wheezing which she attributes to her asthma.  Her albuterol inhaler does help with SOB/wheezing.  Patient's friend recommended that she come into urgent care as she has not been feeling well and now she is concerned for pneumonia.  No fever.        Review of Systems   Constitutional:  Positive for malaise/fatigue. Negative for chills and fever.   HENT:  Positive for congestion. Negative for ear pain and sore throat.    Respiratory:  Positive for cough, sputum production, shortness of breath and wheezing. Negative for hemoptysis.    Cardiovascular:  Negative for chest pain, palpitations, orthopnea and leg swelling.   Gastrointestinal:  Negative for abdominal pain, constipation, diarrhea, nausea and vomiting.   All other systems reviewed and are negative.             Objective     /82   Pulse 74   Temp 36.8 °C (98.3 °F) (Temporal)   Resp 19   Ht 1.626 m (5' 4\")   Wt 95.3 kg (210 lb)   LMP 06/04/2005 (LMP Unknown)   SpO2 93%   BMI 36.05 kg/m²      Physical Exam  Vitals reviewed.   Constitutional:       General: She is not in acute distress.     Appearance: Normal appearance. She is not toxic-appearing.   HENT:      Head: Normocephalic and atraumatic.      Right Ear: Tympanic membrane, ear canal and external ear normal.      Left Ear: Tympanic membrane, ear canal and external ear normal.      " Nose: Congestion present.      Mouth/Throat:      Lips: Pink.      Mouth: Mucous membranes are moist.      Pharynx: Oropharynx is clear. Uvula midline.   Eyes:      Extraocular Movements: Extraocular movements intact.      Conjunctiva/sclera: Conjunctivae normal.      Pupils: Pupils are equal, round, and reactive to light.   Cardiovascular:      Rate and Rhythm: Normal rate.   Pulmonary:      Effort: Pulmonary effort is normal. No respiratory distress.      Breath sounds: Normal breath sounds. No stridor. No wheezing, rhonchi or rales.   Musculoskeletal:      Right lower leg: No edema.      Left lower leg: No edema.   Skin:     General: Skin is warm and dry.   Neurological:      General: No focal deficit present.      Mental Status: She is alert and oriented to person, place, and time.                                  Assessment & Plan  Cough, unspecified type    Orders:    DX-CHEST-2 VIEWS; Future    SOB (shortness of breath)    Orders:    DX-CHEST-2 VIEWS; Future    Atelectasis of left lung    Orders:    Referral to Pulmonary and Sleep Medicine    Exacerbation of asthma, unspecified asthma severity, unspecified whether persistent    Orders:    methylPREDNISolone (MEDROL DOSEPAK) 4 MG Tablet Therapy Pack; Follow schedule on package instructions.    Referral to Pulmonary and Sleep Medicine    RTI (respiratory tract infection)    Orders:    amoxicillin-clavulanate (AUGMENTIN) 875-125 MG Tab; Take 1 Tablet by mouth 2 times a day for 5 days.             RADIOLOGY RESULTS   DX-CHEST-2 VIEWS  Result Date: 3/7/2025  3/7/2025 5:28 PM HISTORY/REASON FOR EXAM:  Cough, congestion TECHNIQUE/EXAM DESCRIPTION AND NUMBER OF VIEWS: Two views of the chest. COMPARISON:  8/19/2024 FINDINGS: Cardiomediastinal contour is within normal limits. Linear opacity LEFT lung base. No pleural fluid collection or pneumothorax. Degenerative change of thoracic spine. Mild anterior wedge compression at L1, likely chronic.     1.  LEFT lung base  atelectasis. 2.  No pneumonia or pulmonary edema.             I personally reviewed prior external notes and test results pertinent to today's visit, including urgent care visit on 2/23/2025.  Patient was treated with doxycycline twice daily for 7 days.  Patient was also prescribed prednisone 40 mg daily for 5 days.  Patient completed as prescribed and still not feeling better.  Reports productive cough with associated SOB/wheezing.  Patient does have history of asthma.    Differential diagnoses, supportive care measures and indications for immediate follow-up discussed with patient. Pathogenesis of diagnosis discussed including typical length and natural progression.  Follow up with PCP. Referral to pulmonology also placed for additional follow up/management if needed. ER precautions discussed.    Instructed to return to urgent care or nearest emergency department if symptoms fail to improve, for any change in condition, further concerns, or new concerning symptoms.    Patient states understanding and agrees with the plan of care and discharge instructions.

## 2025-03-13 NOTE — Clinical Note
REFERRAL APPROVAL NOTICE         Sent on March 13, 2025                   Laurenrobert Parr Alvin  [address] [place] [address]                   Dear Ms. Esquivel,    After a careful review of the medical information and benefit coverage, [facility] has processed your referral. See below for additional details.    If applicable, you must be actively enrolled with your insurance for coverage of the authorized service. If you have any questions regarding your coverage, please contact your insurance directly.    REFERRAL INFORMATION   Referral #:  [id number]  Referred-To Department    Referred-By Provider:  Pulmonary and Sleep Medicine    Kelsey Adler P.A.-C.   Pulmonary/sleep [facility]      [address]  [address]  [place] [id number] [phone] [address], [address]  [place] [id number] [phone]    Referral Start Date:  03/07/2025  Referral End Date:   03/07/2026           SCHEDULING  If you do not already have an appointment, please call [phone] to make an appointment.   MORE INFORMATION  As a reminder, [facility] ownership has changed, meaning this location is now owned and operated by [facility]. As such, we want to clarify that our patients should expect to receive two separate bills for the services received at [facility] - one representing the [facility] facility fees as the owner of the establishment, and the other to represent the physician's services and subsequent fees. You can speak with your insurance carrier for a pricing estimate by calling the customer service number on the back of your card and ask about charges for a hospital outpatient visit.  If you do not already have a [employer] account, sign up at: [email or web address]  You can access your medical information, make appointments, see lab results, billing  information, and more.  If you have questions regarding this referral, please contact  the [facility] department at:             [phone]. Monday - Friday 7:30AM - 5:00PM.      Sincerely,  [facility]

## 2025-04-15 RX ORDER — MECLIZINE HCL 12.5 MG 12.5 MG/1
12.5 TABLET ORAL 3 TIMES DAILY PRN
Qty: 30 TABLET | Refills: 0 | Status: SHIPPED | OUTPATIENT
Start: 2025-04-15 | End: 2025-04-17

## 2025-04-16 ENCOUNTER — TELEPHONE (OUTPATIENT)
Dept: MEDICAL GROUP | Facility: MEDICAL CENTER | Age: 75
End: 2025-04-16
Payer: MEDICARE

## 2025-04-16 NOTE — TELEPHONE ENCOUNTER
I will discuss antibiotic need with patient at next office visit. Please advise patient. Thanks   Yessy Paz M.D.

## 2025-04-16 NOTE — TELEPHONE ENCOUNTER
PT left a VM in regards of requesting antibiotics. Pt will have a root canal procedure scheduled on 04/21. The dentist, won't prescribe her antibiotics because of she has mitrovalve prolapse. Pt has an appointment with you tomorrow..

## 2025-04-17 ENCOUNTER — OFFICE VISIT (OUTPATIENT)
Dept: MEDICAL GROUP | Facility: MEDICAL CENTER | Age: 75
End: 2025-04-17
Payer: MEDICARE

## 2025-04-17 VITALS
TEMPERATURE: 97.6 F | BODY MASS INDEX: 37.26 KG/M2 | HEART RATE: 61 BPM | HEIGHT: 64 IN | SYSTOLIC BLOOD PRESSURE: 124 MMHG | DIASTOLIC BLOOD PRESSURE: 72 MMHG | WEIGHT: 218.26 LBS | OXYGEN SATURATION: 94 %

## 2025-04-17 DIAGNOSIS — Z77.29 EXPOSURE TO RODENT: ICD-10-CM

## 2025-04-17 DIAGNOSIS — H81.10 BENIGN PAROXYSMAL POSITIONAL VERTIGO, UNSPECIFIED LATERALITY: ICD-10-CM

## 2025-04-17 DIAGNOSIS — E66.01 SEVERE OBESITY (BMI 35.0-39.9) WITH COMORBIDITY (HCC): ICD-10-CM

## 2025-04-17 PROBLEM — E66.9 OBESITY (BMI 30-39.9): Status: ACTIVE | Noted: 2025-04-17

## 2025-04-17 PROCEDURE — 3074F SYST BP LT 130 MM HG: CPT | Performed by: FAMILY MEDICINE

## 2025-04-17 PROCEDURE — 99214 OFFICE O/P EST MOD 30 MIN: CPT | Performed by: FAMILY MEDICINE

## 2025-04-17 PROCEDURE — 3078F DIAST BP <80 MM HG: CPT | Performed by: FAMILY MEDICINE

## 2025-04-17 RX ORDER — MECLIZINE HCL 12.5 MG 12.5 MG/1
12.5 TABLET ORAL NIGHTLY PRN
Qty: 30 TABLET | Refills: 0
Start: 2025-04-17

## 2025-04-17 ASSESSMENT — FIBROSIS 4 INDEX: FIB4 SCORE: 1.07

## 2025-04-17 ASSESSMENT — PATIENT HEALTH QUESTIONNAIRE - PHQ9: CLINICAL INTERPRETATION OF PHQ2 SCORE: 0

## 2025-04-17 NOTE — PROGRESS NOTES
Verbal consent was acquired by the patient to use [employer] ambient listening note generation during this visit: YES    CC: vertigo     Assessment & Plan:     1. Benign paroxysmal positional vertigo, unspecified laterality  History and exam are concerning for acute BPPV.   Symptoms improves significantly today.   She wishes to work with physical therapy.   - Referral to Physical Therapy  - Meclizine 12.5 mg qhs PRN   - fall prevention discussed    2. Exposure to rodent  Patient complains of exposure to rodent as her house was infested with mice. She has one bout of respiratory illness in 2/2025 needed several visit to urgent care. She wishes to know if she has Hanta virus infection.   - HANTAVIRUS ANTIBODIES KELLY; Future  - recommended pest control   - follow up PRN     3. Severe obesity (BMI 35.0-39.9) with comorbidity (HCC)  - Patient identified as having weight management issue.  Appropriate orders and counseling given.             Subjective:       HPI:     History of Present Illness  The patient presents for evaluation of vertigo, hearing loss, dental issues, respiratory virus, and skin lesions.    Patient develops acute, intermittent, transient vertigo lasting for 1-2 minutes with head turn to the side and neck flexion. Symptoms lasted for 2 weeks. Fortunately, symptoms have significantly improved over the past 2 days. She was prescribed meclizine but has not been able to  at the pharmacy. She still has mild symptoms and wishes to work with physical therapy. Negative history of fall.     A progressive worsening of hearing is reported, although it is not believed to be severe enough to warrant a referral to an audiologist. Difficulty hearing occurs when people speak softly or with their heads down.    A severe respiratory virus was experienced at the end of February and March, requiring 2 visits to urgent care. Concern about potential exposure to Hantavirus is expressed due to a mouse infestation in  "the home, which coincided with the onset of respiratory symptoms. No current symptoms are reported.      FAMILY HISTORY  Her father had benign skin lesions.      Current Outpatient Medications:   meclizine (ANTIVERT) 12.5 MG Tab, Take 1 Tablet by mouth 3 times a day as needed for Dizziness., Disp: 30 Tablet, Rfl: 0  albuterol 108 (90 Base) MCG/ACT Aero Soln inhalation aerosol, Inhale 2 Puffs every 6 hours as needed for Shortness of Breath., Disp: 1 Each, Rfl: 1  fluticasone-salmeterol (WIXELA INHUB) 250-50 MCG/ACT AEROSOL POWDER, BREATH ACTIVATED, Inhale 1 Puff every 12 hours., Disp: 60 Each, Rfl: 5  rosuvastatin (CRESTOR) 20 MG Tab, Take 1 Tablet by mouth every evening., Disp: 90 Tablet, Rfl: 3  albuterol (PROVENTIL) 2.5mg/3ml Nebu Soln solution for nebulization, Take 3 mL by nebulization every four hours as needed for Shortness of Breath., Disp: 15 Each, Rfl: 0  telmisartan (MICARDIS) 40 MG Tab, Take 1 Tablet by mouth every day., Disp: 90 Tablet, Rfl: 3  metFORMIN ER (GLUCOPHAGE XR) 500 MG TABLET SR 24 HR, Take 1 Tablet by mouth 2 times a day., Disp: 180 Tablet, Rfl: 3  triamcinolone acetonide (KENALOG) 0.1 % Ointment, Apply 1 Application topically 2 times a day., Disp: 30 g, Rfl: 2     Objective:     Exam:  /72 (BP Location: Left arm, Patient Position: Sitting, BP Cuff Size: Large adult)   Pulse 61   Temp 36.4 °C (97.6 °F) (Temporal)   Ht 1.626 m (5' 4\")   Wt 99 kg (218 lb 4.1 oz)   LMP 06/04/2005 (LMP Unknown)   SpO2 94%   BMI 37.46 kg/m²  Body mass index is 37.46 kg/m².    Constitutional: awake, alert, in no distress.  Skin: Warm, dry, good turgor, no rashes, bruises, ulcers in visible areas.  Eye: conjunctiva clear, lids neg for edema or lesions.  ENMT: TM and auditory canals wnl. Oral and nasal mucosa wnl. Lips without lesions, good dentition, oropharynx clear.  Neck: Trachea midline, no masses, no thyromegaly. No cervical or supraclavicular lymphadenopathy  Respiratory: Unlabored respiratory " effort, lungs clear to auscultation, no wheezes, no rales.  Cardiovascular: Normal S1, S2, no murmur, no pedal edema.  Psych: Oriented x3, affect and mood wnl, intact judgement and insight.         Return in about 3 months (around 7/17/2025) for Multiple issues.          Please note that this dictation was created using voice recognition software. I have made every reasonable attempt to correct obvious errors, but I expect that there are errors of grammar and possibly content that I did not discover before finalizing the note.

## 2025-04-23 NOTE — Clinical Note
REFERRAL APPROVAL NOTICE         Sent on April 23, 2025                   Lauren Karolyn Alvin  [address] [place] [address]                   Dear Ms. Esquivel,    After a careful review of the medical information and benefit coverage, [facility] has processed your referral. See below for additional details.    If applicable, you must be actively enrolled with your insurance for coverage of the authorized service. If you have any questions regarding your coverage, please contact your insurance directly.    REFERRAL INFORMATION   Referral #:  [id number]  Referred-To Department    Referred-By Provider:  Physical Therapy    Yessy Paz M.D.   [facility] [address]      [address]  [address]  [place] [id number] [phone] [address]  [address]  [place] [id number] [phone]    Referral Start Date:  04/17/2025  Referral End Date:   04/17/2026             SCHEDULING  If you do not already have an appointment, please call [phone] to make an appointment.     MORE INFORMATION  If you do not already have a [employer] account, sign up at: [email or web address]  You can access your medical information, make appointments, see lab results, billing information, and more.  If you have questions regarding this referral, please contact  the [facility] Referrals department at:             [phone]. Monday - Friday 8:00AM - 5:00PM.     Sincerely,    [facility]

## 2025-05-03 ENCOUNTER — APPOINTMENT (OUTPATIENT)
Dept: RADIOLOGY | Facility: MEDICAL CENTER | Age: 75
DRG: 689 | End: 2025-05-03
Attending: EMERGENCY MEDICINE
Payer: MEDICARE

## 2025-05-03 ENCOUNTER — HOSPITAL ENCOUNTER (INPATIENT)
Facility: MEDICAL CENTER | Age: 75
LOS: 4 days | DRG: 689 | End: 2025-05-07
Attending: EMERGENCY MEDICINE | Admitting: HOSPITALIST
Payer: MEDICARE

## 2025-05-03 ENCOUNTER — OFFICE VISIT (OUTPATIENT)
Dept: URGENT CARE | Facility: CLINIC | Age: 75
End: 2025-05-03
Payer: MEDICARE

## 2025-05-03 VITALS
SYSTOLIC BLOOD PRESSURE: 108 MMHG | DIASTOLIC BLOOD PRESSURE: 60 MMHG | TEMPERATURE: 101.8 F | HEIGHT: 64 IN | HEART RATE: 122 BPM | BODY MASS INDEX: 36.19 KG/M2 | RESPIRATION RATE: 16 BRPM | OXYGEN SATURATION: 90 % | WEIGHT: 212 LBS

## 2025-05-03 DIAGNOSIS — N12 PYELONEPHRITIS: ICD-10-CM

## 2025-05-03 DIAGNOSIS — R09.02 HYPOXIA: ICD-10-CM

## 2025-05-03 DIAGNOSIS — J45.30 MILD PERSISTENT ASTHMA WITHOUT COMPLICATION: ICD-10-CM

## 2025-05-03 DIAGNOSIS — J44.9 OBSTRUCTIVE LUNG DISEASE (HCC): ICD-10-CM

## 2025-05-03 DIAGNOSIS — R65.10 SIRS (SYSTEMIC INFLAMMATORY RESPONSE SYNDROME) (HCC): ICD-10-CM

## 2025-05-03 DIAGNOSIS — R50.9 FEVER, UNSPECIFIED FEVER CAUSE: ICD-10-CM

## 2025-05-03 DIAGNOSIS — E66.812 OBESITY, CLASS 2: ICD-10-CM

## 2025-05-03 DIAGNOSIS — E78.00 PURE HYPERCHOLESTEROLEMIA: ICD-10-CM

## 2025-05-03 DIAGNOSIS — I10 PRIMARY HYPERTENSION: ICD-10-CM

## 2025-05-03 DIAGNOSIS — G47.33 OSA ON CPAP: ICD-10-CM

## 2025-05-03 DIAGNOSIS — R10.32 LLQ ABDOMINAL PAIN: ICD-10-CM

## 2025-05-03 DIAGNOSIS — R00.0 TACHYCARDIA: ICD-10-CM

## 2025-05-03 LAB
ALBUMIN SERPL BCP-MCNC: 3.9 G/DL (ref 3.2–4.9)
ALBUMIN/GLOB SERPL: 1.1 G/DL
ALP SERPL-CCNC: 49 U/L (ref 30–99)
ALT SERPL-CCNC: 25 U/L (ref 2–50)
ANION GAP SERPL CALC-SCNC: 16 MMOL/L (ref 7–16)
APPEARANCE UR: ABNORMAL
AST SERPL-CCNC: 21 U/L (ref 12–45)
BACTERIA #/AREA URNS HPF: ABNORMAL /HPF
BASOPHILS # BLD AUTO: 0.5 % (ref 0–1.8)
BASOPHILS # BLD: 0.05 K/UL (ref 0–0.12)
BILIRUB SERPL-MCNC: 0.7 MG/DL (ref 0.1–1.5)
BILIRUB UR QL STRIP.AUTO: NEGATIVE
BUN SERPL-MCNC: 15 MG/DL (ref 8–22)
CALCIUM ALBUM COR SERPL-MCNC: 9.2 MG/DL (ref 8.5–10.5)
CALCIUM SERPL-MCNC: 9.1 MG/DL (ref 8.5–10.5)
CASTS URNS QL MICRO: ABNORMAL /LPF (ref 0–2)
CHLORIDE SERPL-SCNC: 102 MMOL/L (ref 96–112)
CO2 SERPL-SCNC: 19 MMOL/L (ref 20–33)
COLOR UR: YELLOW
CREAT SERPL-MCNC: 0.83 MG/DL (ref 0.5–1.4)
EOSINOPHIL # BLD AUTO: 0.17 K/UL (ref 0–0.51)
EOSINOPHIL NFR BLD: 1.8 % (ref 0–6.9)
EPITHELIAL CELLS 1715: ABNORMAL /HPF (ref 0–5)
ERYTHROCYTE [DISTWIDTH] IN BLOOD BY AUTOMATED COUNT: 42.7 FL (ref 35.9–50)
GFR SERPLBLD CREATININE-BSD FMLA CKD-EPI: 73 ML/MIN/1.73 M 2
GLOBULIN SER CALC-MCNC: 3.4 G/DL (ref 1.9–3.5)
GLUCOSE SERPL-MCNC: 119 MG/DL (ref 65–99)
GLUCOSE UR STRIP.AUTO-MCNC: NEGATIVE MG/DL
HCT VFR BLD AUTO: 38.5 % (ref 37–47)
HGB BLD-MCNC: 12.8 G/DL (ref 12–16)
IMM GRANULOCYTES # BLD AUTO: 0.04 K/UL (ref 0–0.11)
IMM GRANULOCYTES NFR BLD AUTO: 0.4 % (ref 0–0.9)
INR PPP: 1.03 (ref 0.87–1.13)
KETONES UR STRIP.AUTO-MCNC: ABNORMAL MG/DL
LACTATE SERPL-SCNC: 1.1 MMOL/L (ref 0.5–2)
LEUKOCYTE ESTERASE UR QL STRIP.AUTO: ABNORMAL
LIPASE SERPL-CCNC: 12 U/L (ref 11–82)
LYMPHOCYTES # BLD AUTO: 1.28 K/UL (ref 1–4.8)
LYMPHOCYTES NFR BLD: 13.3 % (ref 22–41)
MCH RBC QN AUTO: 28.6 PG (ref 27–33)
MCHC RBC AUTO-ENTMCNC: 33.2 G/DL (ref 32.2–35.5)
MCV RBC AUTO: 85.9 FL (ref 81.4–97.8)
MICRO URNS: ABNORMAL
MONOCYTES # BLD AUTO: 1.16 K/UL (ref 0–0.85)
MONOCYTES NFR BLD AUTO: 12 % (ref 0–13.4)
NEUTROPHILS # BLD AUTO: 6.93 K/UL (ref 1.82–7.42)
NEUTROPHILS NFR BLD: 72 % (ref 44–72)
NITRITE UR QL STRIP.AUTO: POSITIVE
NRBC # BLD AUTO: 0 K/UL
NRBC BLD-RTO: 0 /100 WBC (ref 0–0.2)
PH UR STRIP.AUTO: 5.5 [PH] (ref 5–8)
PLATELET # BLD AUTO: 368 K/UL (ref 164–446)
PMV BLD AUTO: 9.8 FL (ref 9–12.9)
POTASSIUM SERPL-SCNC: 3.4 MMOL/L (ref 3.6–5.5)
PROCALCITONIN SERPL-MCNC: 0.17 NG/ML
PROT SERPL-MCNC: 7.3 G/DL (ref 6–8.2)
PROT UR QL STRIP: NEGATIVE MG/DL
PROTHROMBIN TIME: 13.8 SEC (ref 12–14.6)
RBC # BLD AUTO: 4.48 M/UL (ref 4.2–5.4)
RBC # URNS HPF: ABNORMAL /HPF
RBC UR QL AUTO: ABNORMAL
SODIUM SERPL-SCNC: 137 MMOL/L (ref 135–145)
SP GR UR STRIP.AUTO: >1.035
UROBILINOGEN UR STRIP.AUTO-MCNC: 0.2 EU/DL
WBC # BLD AUTO: 9.6 K/UL (ref 4.8–10.8)
WBC #/AREA URNS HPF: >100 /HPF

## 2025-05-03 PROCEDURE — 83605 ASSAY OF LACTIC ACID: CPT

## 2025-05-03 PROCEDURE — 87040 BLOOD CULTURE FOR BACTERIA: CPT

## 2025-05-03 PROCEDURE — 85610 PROTHROMBIN TIME: CPT

## 2025-05-03 PROCEDURE — 99214 OFFICE O/P EST MOD 30 MIN: CPT | Performed by: NURSE PRACTITIONER

## 2025-05-03 PROCEDURE — 96374 THER/PROPH/DIAG INJ IV PUSH: CPT

## 2025-05-03 PROCEDURE — 85025 COMPLETE CBC W/AUTO DIFF WBC: CPT

## 2025-05-03 PROCEDURE — 81001 URINALYSIS AUTO W/SCOPE: CPT

## 2025-05-03 PROCEDURE — A9270 NON-COVERED ITEM OR SERVICE: HCPCS | Performed by: EMERGENCY MEDICINE

## 2025-05-03 PROCEDURE — 36415 COLL VENOUS BLD VENIPUNCTURE: CPT

## 2025-05-03 PROCEDURE — 700102 HCHG RX REV CODE 250 W/ 637 OVERRIDE(OP): Performed by: EMERGENCY MEDICINE

## 2025-05-03 PROCEDURE — 99222 1ST HOSP IP/OBS MODERATE 55: CPT | Mod: AI | Performed by: HOSPITALIST

## 2025-05-03 PROCEDURE — 80053 COMPREHEN METABOLIC PANEL: CPT

## 2025-05-03 PROCEDURE — 700105 HCHG RX REV CODE 258: Performed by: EMERGENCY MEDICINE

## 2025-05-03 PROCEDURE — 74177 CT ABD & PELVIS W/CONTRAST: CPT

## 2025-05-03 PROCEDURE — 700117 HCHG RX CONTRAST REV CODE 255: Performed by: EMERGENCY MEDICINE

## 2025-05-03 PROCEDURE — 71045 X-RAY EXAM CHEST 1 VIEW: CPT

## 2025-05-03 PROCEDURE — 87186 SC STD MICRODIL/AGAR DIL: CPT

## 2025-05-03 PROCEDURE — 87077 CULTURE AEROBIC IDENTIFY: CPT

## 2025-05-03 PROCEDURE — 87086 URINE CULTURE/COLONY COUNT: CPT

## 2025-05-03 PROCEDURE — 700111 HCHG RX REV CODE 636 W/ 250 OVERRIDE (IP): Mod: JZ | Performed by: EMERGENCY MEDICINE

## 2025-05-03 PROCEDURE — 99285 EMERGENCY DEPT VISIT HI MDM: CPT

## 2025-05-03 PROCEDURE — 83690 ASSAY OF LIPASE: CPT

## 2025-05-03 PROCEDURE — 770001 HCHG ROOM/CARE - MED/SURG/GYN PRIV*

## 2025-05-03 PROCEDURE — 84145 PROCALCITONIN (PCT): CPT

## 2025-05-03 RX ORDER — ONDANSETRON 2 MG/ML
4 INJECTION INTRAMUSCULAR; INTRAVENOUS EVERY 4 HOURS PRN
Status: DISCONTINUED | OUTPATIENT
Start: 2025-05-03 | End: 2025-05-07 | Stop reason: HOSPADM

## 2025-05-03 RX ORDER — SODIUM CHLORIDE 9 MG/ML
1000 INJECTION, SOLUTION INTRAVENOUS ONCE
Status: COMPLETED | OUTPATIENT
Start: 2025-05-03 | End: 2025-05-03

## 2025-05-03 RX ORDER — TELMISARTAN 40 MG/1
40 TABLET ORAL DAILY
Status: DISCONTINUED | OUTPATIENT
Start: 2025-05-04 | End: 2025-05-07 | Stop reason: HOSPADM

## 2025-05-03 RX ORDER — SODIUM CHLORIDE, SODIUM LACTATE, POTASSIUM CHLORIDE, AND CALCIUM CHLORIDE .6; .31; .03; .02 G/100ML; G/100ML; G/100ML; G/100ML
500 INJECTION, SOLUTION INTRAVENOUS
Status: DISCONTINUED | OUTPATIENT
Start: 2025-05-03 | End: 2025-05-07 | Stop reason: HOSPADM

## 2025-05-03 RX ORDER — INSULIN LISPRO 100 [IU]/ML
1-6 INJECTION, SOLUTION INTRAVENOUS; SUBCUTANEOUS
Status: DISCONTINUED | OUTPATIENT
Start: 2025-05-04 | End: 2025-05-05

## 2025-05-03 RX ORDER — ROSUVASTATIN CALCIUM 10 MG/1
20 TABLET, COATED ORAL EVERY EVENING
Status: DISCONTINUED | OUTPATIENT
Start: 2025-05-04 | End: 2025-05-07 | Stop reason: HOSPADM

## 2025-05-03 RX ORDER — CEFTRIAXONE 2 G/1
2000 INJECTION, POWDER, FOR SOLUTION INTRAMUSCULAR; INTRAVENOUS ONCE
Status: COMPLETED | OUTPATIENT
Start: 2025-05-03 | End: 2025-05-03

## 2025-05-03 RX ORDER — ONDANSETRON 4 MG/1
4 TABLET, ORALLY DISINTEGRATING ORAL EVERY 4 HOURS PRN
Status: DISCONTINUED | OUTPATIENT
Start: 2025-05-03 | End: 2025-05-07 | Stop reason: HOSPADM

## 2025-05-03 RX ORDER — SODIUM CHLORIDE, SODIUM LACTATE, POTASSIUM CHLORIDE, AND CALCIUM CHLORIDE .6; .31; .03; .02 G/100ML; G/100ML; G/100ML; G/100ML
1000 INJECTION, SOLUTION INTRAVENOUS ONCE
Status: COMPLETED | OUTPATIENT
Start: 2025-05-03 | End: 2025-05-03

## 2025-05-03 RX ORDER — ACETAMINOPHEN 325 MG/1
650 TABLET ORAL ONCE
Status: COMPLETED | OUTPATIENT
Start: 2025-05-03 | End: 2025-05-03

## 2025-05-03 RX ORDER — POLYETHYLENE GLYCOL 3350 17 G/17G
1 POWDER, FOR SOLUTION ORAL
Status: DISCONTINUED | OUTPATIENT
Start: 2025-05-03 | End: 2025-05-07 | Stop reason: HOSPADM

## 2025-05-03 RX ORDER — ACETAMINOPHEN 325 MG/1
650 TABLET ORAL EVERY 6 HOURS PRN
Status: DISCONTINUED | OUTPATIENT
Start: 2025-05-03 | End: 2025-05-07 | Stop reason: HOSPADM

## 2025-05-03 RX ORDER — DEXTROSE MONOHYDRATE 25 G/50ML
25 INJECTION, SOLUTION INTRAVENOUS
Status: DISCONTINUED | OUTPATIENT
Start: 2025-05-03 | End: 2025-05-05

## 2025-05-03 RX ORDER — FLUTICASONE PROPIONATE AND SALMETEROL 250; 50 UG/1; UG/1
1 POWDER RESPIRATORY (INHALATION) EVERY 12 HOURS
Status: DISCONTINUED | OUTPATIENT
Start: 2025-05-03 | End: 2025-05-03

## 2025-05-03 RX ORDER — ONDANSETRON 2 MG/ML
4 INJECTION INTRAMUSCULAR; INTRAVENOUS ONCE
Status: DISPENSED | OUTPATIENT
Start: 2025-05-03 | End: 2025-05-04

## 2025-05-03 RX ORDER — AMOXICILLIN 250 MG
2 CAPSULE ORAL EVERY EVENING
Status: DISCONTINUED | OUTPATIENT
Start: 2025-05-03 | End: 2025-05-07 | Stop reason: HOSPADM

## 2025-05-03 RX ADMIN — CEFTRIAXONE SODIUM 2000 MG: 2 INJECTION, POWDER, FOR SOLUTION INTRAMUSCULAR; INTRAVENOUS at 22:49

## 2025-05-03 RX ADMIN — SODIUM CHLORIDE, POTASSIUM CHLORIDE, SODIUM LACTATE AND CALCIUM CHLORIDE 1000 ML: 600; 310; 30; 20 INJECTION, SOLUTION INTRAVENOUS at 18:56

## 2025-05-03 RX ADMIN — IOHEXOL 100 ML: 350 INJECTION, SOLUTION INTRAVENOUS at 20:00

## 2025-05-03 RX ADMIN — SODIUM CHLORIDE 1000 ML: 9 INJECTION, SOLUTION INTRAVENOUS at 23:45

## 2025-05-03 RX ADMIN — ACETAMINOPHEN 650 MG: 325 TABLET ORAL at 18:00

## 2025-05-03 SDOH — ECONOMIC STABILITY: TRANSPORTATION INSECURITY
IN THE PAST 12 MONTHS, HAS LACK OF RELIABLE TRANSPORTATION KEPT YOU FROM MEDICAL APPOINTMENTS, MEETINGS, WORK OR FROM GETTING THINGS NEEDED FOR DAILY LIVING?: NO

## 2025-05-03 SDOH — ECONOMIC STABILITY: TRANSPORTATION INSECURITY
IN THE PAST 12 MONTHS, HAS THE LACK OF TRANSPORTATION KEPT YOU FROM MEDICAL APPOINTMENTS OR FROM GETTING MEDICATIONS?: NO

## 2025-05-03 ASSESSMENT — ENCOUNTER SYMPTOMS
FEVER: 1
FEVER: 0
NECK PAIN: 0
HEADACHES: 0
SORE THROAT: 0
DOUBLE VISION: 0
BRUISES/BLEEDS EASILY: 0
WEAKNESS: 0
ABDOMINAL PAIN: 1
DEPRESSION: 0
COUGH: 0
DIZZINESS: 0
BLURRED VISION: 0
SHORTNESS OF BREATH: 0
INSOMNIA: 0
MYALGIAS: 0
VOMITING: 0
VOMITING: 0
ABDOMINAL PAIN: 1
PALPITATIONS: 0
CHILLS: 1
NAUSEA: 1
NAUSEA: 0

## 2025-05-03 ASSESSMENT — COGNITIVE AND FUNCTIONAL STATUS - GENERAL
CLIMB 3 TO 5 STEPS WITH RAILING: A LITTLE
MOBILITY SCORE: 22
DAILY ACTIVITIY SCORE: 24
SUGGESTED CMS G CODE MODIFIER MOBILITY: CJ
WALKING IN HOSPITAL ROOM: A LITTLE
SUGGESTED CMS G CODE MODIFIER DAILY ACTIVITY: CH

## 2025-05-03 ASSESSMENT — SOCIAL DETERMINANTS OF HEALTH (SDOH)
WITHIN THE PAST 12 MONTHS, THE FOOD YOU BOUGHT JUST DIDN'T LAST AND YOU DIDN'T HAVE MONEY TO GET MORE: NEVER TRUE
WITHIN THE LAST YEAR, HAVE YOU BEEN HUMILIATED OR EMOTIONALLY ABUSED IN OTHER WAYS BY YOUR PARTNER OR EX-PARTNER?: NO
WITHIN THE LAST YEAR, HAVE YOU BEEN KICKED, HIT, SLAPPED, OR OTHERWISE PHYSICALLY HURT BY YOUR PARTNER OR EX-PARTNER?: NO
WITHIN THE LAST YEAR, HAVE YOU BEEN AFRAID OF YOUR PARTNER OR EX-PARTNER?: NO
WITHIN THE LAST YEAR, HAVE TO BEEN RAPED OR FORCED TO HAVE ANY KIND OF SEXUAL ACTIVITY BY YOUR PARTNER OR EX-PARTNER?: NO
WITHIN THE PAST 12 MONTHS, YOU WORRIED THAT YOUR FOOD WOULD RUN OUT BEFORE YOU GOT THE MONEY TO BUY MORE: NEVER TRUE
IN THE PAST 12 MONTHS, HAS THE ELECTRIC, GAS, OIL, OR WATER COMPANY THREATENED TO SHUT OFF SERVICE IN YOUR HOME?: NO

## 2025-05-03 ASSESSMENT — FIBROSIS 4 INDEX
FIB4 SCORE: 0.86
FIB4 SCORE: 1.07
FIB4 SCORE: 1.07

## 2025-05-03 ASSESSMENT — PAIN DESCRIPTION - PAIN TYPE: TYPE: ACUTE PAIN

## 2025-05-04 PROBLEM — E87.6 HYPOKALEMIA: Status: ACTIVE | Noted: 2025-05-04

## 2025-05-04 PROBLEM — R09.02 HYPOXIA: Status: ACTIVE | Noted: 2025-05-04

## 2025-05-04 LAB
ANION GAP SERPL CALC-SCNC: 14 MMOL/L (ref 7–16)
BUN SERPL-MCNC: 13 MG/DL (ref 8–22)
CALCIUM SERPL-MCNC: 8.6 MG/DL (ref 8.5–10.5)
CHLORIDE SERPL-SCNC: 105 MMOL/L (ref 96–112)
CO2 SERPL-SCNC: 21 MMOL/L (ref 20–33)
CREAT SERPL-MCNC: 0.69 MG/DL (ref 0.5–1.4)
ERYTHROCYTE [DISTWIDTH] IN BLOOD BY AUTOMATED COUNT: 44.5 FL (ref 35.9–50)
FLUAV RNA SPEC QL NAA+PROBE: NEGATIVE
FLUBV RNA SPEC QL NAA+PROBE: NEGATIVE
GFR SERPLBLD CREATININE-BSD FMLA CKD-EPI: 90 ML/MIN/1.73 M 2
GLUCOSE BLD STRIP.AUTO-MCNC: 113 MG/DL (ref 65–99)
GLUCOSE BLD STRIP.AUTO-MCNC: 114 MG/DL (ref 65–99)
GLUCOSE BLD STRIP.AUTO-MCNC: 119 MG/DL (ref 65–99)
GLUCOSE SERPL-MCNC: 108 MG/DL (ref 65–99)
HCT VFR BLD AUTO: 38.2 % (ref 37–47)
HGB BLD-MCNC: 12.2 G/DL (ref 12–16)
LACTATE SERPL-SCNC: 1 MMOL/L (ref 0.5–2)
MCH RBC QN AUTO: 28 PG (ref 27–33)
MCHC RBC AUTO-ENTMCNC: 31.9 G/DL (ref 32.2–35.5)
MCV RBC AUTO: 87.8 FL (ref 81.4–97.8)
PLATELET # BLD AUTO: 314 K/UL (ref 164–446)
PMV BLD AUTO: 9.3 FL (ref 9–12.9)
POTASSIUM SERPL-SCNC: 3.2 MMOL/L (ref 3.6–5.5)
RBC # BLD AUTO: 4.35 M/UL (ref 4.2–5.4)
RSV RNA SPEC QL NAA+PROBE: NEGATIVE
SARS-COV-2 RNA RESP QL NAA+PROBE: NOTDETECTED
SODIUM SERPL-SCNC: 140 MMOL/L (ref 135–145)
SPECIMEN SOURCE: NORMAL
WBC # BLD AUTO: 7.8 K/UL (ref 4.8–10.8)

## 2025-05-04 PROCEDURE — 700102 HCHG RX REV CODE 250 W/ 637 OVERRIDE(OP): Performed by: HOSPITALIST

## 2025-05-04 PROCEDURE — 85027 COMPLETE CBC AUTOMATED: CPT

## 2025-05-04 PROCEDURE — 700102 HCHG RX REV CODE 250 W/ 637 OVERRIDE(OP): Performed by: INTERNAL MEDICINE

## 2025-05-04 PROCEDURE — 700111 HCHG RX REV CODE 636 W/ 250 OVERRIDE (IP): Performed by: HOSPITALIST

## 2025-05-04 PROCEDURE — 36415 COLL VENOUS BLD VENIPUNCTURE: CPT

## 2025-05-04 PROCEDURE — 82962 GLUCOSE BLOOD TEST: CPT

## 2025-05-04 PROCEDURE — 80048 BASIC METABOLIC PNL TOTAL CA: CPT

## 2025-05-04 PROCEDURE — 83605 ASSAY OF LACTIC ACID: CPT

## 2025-05-04 PROCEDURE — A9270 NON-COVERED ITEM OR SERVICE: HCPCS | Performed by: HOSPITALIST

## 2025-05-04 PROCEDURE — 770001 HCHG ROOM/CARE - MED/SURG/GYN PRIV*

## 2025-05-04 PROCEDURE — 700105 HCHG RX REV CODE 258: Performed by: HOSPITALIST

## 2025-05-04 PROCEDURE — A9270 NON-COVERED ITEM OR SERVICE: HCPCS | Performed by: INTERNAL MEDICINE

## 2025-05-04 PROCEDURE — 94760 N-INVAS EAR/PLS OXIMETRY 1: CPT

## 2025-05-04 PROCEDURE — 99233 SBSQ HOSP IP/OBS HIGH 50: CPT | Performed by: INTERNAL MEDICINE

## 2025-05-04 PROCEDURE — 0241U HCHG SARS-COV-2 COVID-19 NFCT DS RESP RNA 4 TRGT MIC: CPT

## 2025-05-04 RX ORDER — POTASSIUM CHLORIDE 1500 MG/1
10 TABLET, EXTENDED RELEASE ORAL ONCE
Status: COMPLETED | OUTPATIENT
Start: 2025-05-04 | End: 2025-05-04

## 2025-05-04 RX ORDER — BUDESONIDE AND FORMOTEROL FUMARATE DIHYDRATE 160; 4.5 UG/1; UG/1
2 AEROSOL RESPIRATORY (INHALATION) 2 TIMES DAILY PRN
COMMUNITY
End: 2025-05-29

## 2025-05-04 RX ADMIN — ROSUVASTATIN CALCIUM 20 MG: 10 TABLET, FILM COATED ORAL at 17:17

## 2025-05-04 RX ADMIN — MOMETASONE FUROATE AND FORMOTEROL FUMARATE DIHYDRATE 2 PUFF: 200; 5 AEROSOL RESPIRATORY (INHALATION) at 07:22

## 2025-05-04 RX ADMIN — TELMISARTAN 40 MG: 40 TABLET ORAL at 05:38

## 2025-05-04 RX ADMIN — CEFAZOLIN 2 G: 2 INJECTION, POWDER, FOR SOLUTION INTRAMUSCULAR; INTRAVENOUS at 21:06

## 2025-05-04 RX ADMIN — POTASSIUM CHLORIDE 10 MEQ: 1500 TABLET, EXTENDED RELEASE ORAL at 05:53

## 2025-05-04 RX ADMIN — RIVAROXABAN 10 MG: 10 TABLET, FILM COATED ORAL at 18:07

## 2025-05-04 RX ADMIN — CEFAZOLIN 2 G: 2 INJECTION, POWDER, FOR SOLUTION INTRAMUSCULAR; INTRAVENOUS at 05:40

## 2025-05-04 RX ADMIN — CEFAZOLIN 2 G: 2 INJECTION, POWDER, FOR SOLUTION INTRAMUSCULAR; INTRAVENOUS at 13:59

## 2025-05-04 ASSESSMENT — LIFESTYLE VARIABLES
TOTAL SCORE: 0
TOTAL SCORE: 0
HAVE YOU EVER FELT YOU SHOULD CUT DOWN ON YOUR DRINKING: NO
ALCOHOL_USE: NO
AVERAGE NUMBER OF DAYS PER WEEK YOU HAVE A DRINK CONTAINING ALCOHOL: 0
HOW MANY TIMES IN THE PAST YEAR HAVE YOU HAD 5 OR MORE DRINKS IN A DAY: 0
TOTAL SCORE: 0
HAVE PEOPLE ANNOYED YOU BY CRITICIZING YOUR DRINKING: NO
EVER FELT BAD OR GUILTY ABOUT YOUR DRINKING: NO
CONSUMPTION TOTAL: NEGATIVE
ON A TYPICAL DAY WHEN YOU DRINK ALCOHOL HOW MANY DRINKS DO YOU HAVE: 0
EVER HAD A DRINK FIRST THING IN THE MORNING TO STEADY YOUR NERVES TO GET RID OF A HANGOVER: NO

## 2025-05-04 ASSESSMENT — ENCOUNTER SYMPTOMS
SHORTNESS OF BREATH: 0
BACK PAIN: 0
COUGH: 0
FLANK PAIN: 1
PALPITATIONS: 0
CONSTIPATION: 0
DIZZINESS: 0
HEADACHES: 0
DIARRHEA: 0
FEVER: 1
NAUSEA: 0
VOMITING: 0
ABDOMINAL PAIN: 0
CHILLS: 0

## 2025-05-04 ASSESSMENT — PATIENT HEALTH QUESTIONNAIRE - PHQ9
SUM OF ALL RESPONSES TO PHQ9 QUESTIONS 1 AND 2: 0
1. LITTLE INTEREST OR PLEASURE IN DOING THINGS: NOT AT ALL
2. FEELING DOWN, DEPRESSED, IRRITABLE, OR HOPELESS: NOT AT ALL
2. FEELING DOWN, DEPRESSED, IRRITABLE, OR HOPELESS: NOT AT ALL
1. LITTLE INTEREST OR PLEASURE IN DOING THINGS: NOT AT ALL
SUM OF ALL RESPONSES TO PHQ9 QUESTIONS 1 AND 2: 0

## 2025-05-04 ASSESSMENT — PAIN DESCRIPTION - PAIN TYPE: TYPE: ACUTE PAIN

## 2025-05-04 NOTE — ASSESSMENT & PLAN NOTE
Pt on RA, was 86% while discussing at bedside.  Patient again would desaturate to 86% while at rest in bed, this would occur while speaking or not.  Home oxygen evaluation  I suspect she has a form of OHS. She needs a new PFT, 2008 showed mild obstructive lung disease findings.

## 2025-05-04 NOTE — PROGRESS NOTES
[facility] Medicine Daily Progress Note    Date of Service  5/4/2025    Chief Complaint  Lauren Esquivel is a 74 y.o. female admitted 5/3/2025 with   Chief Complaint   Patient presents with    Abdominal Pain     Pt brought back to triage in wheelchair c/o severe abd pain in her LLQ that started 3 days ago. Went to UC today and due to high HR and fever or 102 they sent her here. Subjective fevers yesterday. + weakness and general malaise.     Diarrhea     Severe diarrhea today with incontinence.   Urinary frequency.      Hospital Course  74-year-old female with a past medical history of hypertension, hyperlipidemia, prediabetes, SENTHIL with CPAP, asthma, admitted on 5/3/2025 for subjective fevers, diarrhea, left lower quadrant abdominal pain.  In the ER patient was found to have fever of 100.8 F, tachycardia , hypoxic to 87% on room air requiring 2 L nasal cannula.  UA was positive for urinary tract infection, dehydration.  Labs also showed hypokalemia but no leukocytosis.  Reviewed CT scan, there is a large right renal cyst, left renal pelvis enhancement with distended ureter without stone possibly distal ureteral stricture. This is consistent with pyelonephritis. Patient was started on IV Ancef. Risk factors include weight, age, female.    Interval Problem Update    Pt states she has been feeling more fatigued at home for the last week, feeling more exhausted trying to get around.  She developed left sided abdominal pains days ago.  She reports she does have urinary incontinence, has to use leak pad.   I explained to patient this could be a source of UTI if she is incontinent and any fecal matter that is in the anal area can moved to the urethra.  UA significantly positive, CT scan consistent with left pyelonephritis.  I changed antibiotics to IV Ancef.  We are pending urine cultures  On my evaluation, patient went down to 86% while we are discussing case.  She was not on oxygen.  I placed her on 2 L.    I  have discussed this patient's plan of care and discharge plan at IDT rounds today with Case Management, Nursing, Nursing leadership, and other members of the IDT team.    Consultants/Specialty  none    Code Status  Full Code    Disposition  The patient is not medically cleared for discharge to home or a post-acute facility.  Anticipate discharge to: home with close outpatient follow-up    I have placed the appropriate orders for post-discharge needs.    Review of Systems  Review of Systems   Constitutional:  Positive for fever and malaise/fatigue. Negative for chills.   Respiratory:  Negative for cough and shortness of breath.    Cardiovascular:  Negative for chest pain and palpitations.   Gastrointestinal:  Negative for abdominal pain, constipation, diarrhea, nausea and vomiting.   Genitourinary:  Positive for flank pain.   Musculoskeletal:  Negative for back pain and joint pain.   Neurological:  Negative for dizziness and headaches.   All other systems reviewed and are negative.       Physical Exam  Temp:  [36.3 °C (97.3 °F)-36.7 °C (98.1 °F)] 36.7 °C (98.1 °F)  Pulse:  [76-96] 86  Resp:  [16] 16  BP: (130-141)/(61-84) 139/61  SpO2:  [87 %-95 %] 95 %    Physical Exam  Vitals and nursing note reviewed.   Constitutional:       General: She is not in acute distress.     Appearance: She is obese. She is not ill-appearing.   HENT:      Head: Normocephalic and atraumatic.   Eyes:      General: No scleral icterus.     Extraocular Movements: Extraocular movements intact.   Cardiovascular:      Rate and Rhythm: Normal rate.      Pulses: Normal pulses.      Heart sounds: Normal heart sounds.      Comments: Difficult to completely auscultate due to body habitus  Pulmonary:      Effort: Pulmonary effort is normal. No respiratory distress.      Breath sounds: Normal breath sounds.      Comments: Difficult to completely auscultate due to body habitus  Abdominal:      General: Bowel sounds are normal. There is no distension.       Tenderness: There is no right CVA tenderness or left CVA tenderness.   Musculoskeletal:         General: No swelling or tenderness. Normal range of motion.      Cervical back: Normal range of motion and neck supple.      Right lower leg: No edema.      Left lower leg: No edema.   Skin:     General: Skin is warm.      Capillary Refill: Capillary refill takes less than 2 seconds.      Coloration: Skin is not jaundiced.      Findings: No erythema.   Neurological:      General: No focal deficit present.      Mental Status: She is alert and oriented to person, place, and time. Mental status is at baseline.      Motor: No weakness.   Psychiatric:         Mood and Affect: Mood normal.         Behavior: Behavior normal.         Thought Content: Thought content normal.         Judgment: Judgment normal.         Fluids    Intake/Output Summary (Last 24 hours) at 5/4/2025 1753  Last data filed at 5/4/2025 1112  Gross per 24 hour   Intake 2084.7 ml   Output --   Net 2084.7 ml        Laboratory  Recent Labs     05/03/25  1820 05/04/25  0358   WBC 9.6 7.8   RBC 4.48 4.35   HEMOGLOBIN 12.8 12.2   HEMATOCRIT 38.5 38.2   MCV 85.9 87.8   MCH 28.6 28.0   MCHC 33.2 31.9*   RDW 42.7 44.5   PLATELETCT 368 314   MPV 9.8 9.3     Recent Labs     05/03/25  1820 05/04/25  0358   SODIUM 137 140   POTASSIUM 3.4* 3.2*   CHLORIDE 102 105   CO2 19* 21   GLUCOSE 119* 108*   BUN 15 13   CREATININE 0.83 0.69   CALCIUM 9.1 8.6     Recent Labs     05/03/25  1820   INR 1.03               Imaging  CT-ABDOMEN-PELVIS WITH   Final Result      1.  Urothelial hyperenhancement in the left ureter and left renal pelvis. Please correlate for ascending UTI.   2.  No other acute inflammatory process in the abdomen or pelvis.      DX-CHEST-PORTABLE (1 VIEW)   Final Result      No acute cardiopulmonary abnormality.              Assessment/Plan  * Pyelonephritis- (present on admission)  Assessment & Plan  UA significantly positive, CT scan consistent with left  pyelonephritis.    I changed antibiotics to IV Ancef.    We are pending urine cultures    Hypoxia- (present on admission)  Assessment & Plan  Pt on RA, was 86% while discussing at bedside.  I placed her back on 2LNC  I ordered home oxygen evaluation.    Hypokalemia- (present on admission)  Assessment & Plan  K 3.2, continue PO replacements    Prediabetes- (present on admission)  Assessment & Plan  A1c's have not cross 6.4  Hold home metformin    Hyperlipidemia- (present on admission)  Assessment & Plan  Continue rosuvastatin    HTN (hypertension)- (present on admission)  Assessment & Plan  - Continue telmisartan    SENTHIL on CPAP- (present on admission)  Assessment & Plan  On my evaluation, patient went down to 86% while we are discussing case.  She was not on oxygen.  I placed her on 2 L.  Patient states she also has asthma         VTE prophylaxis:    Xarelto 10mg daily as prophylaxis      I have performed a physical exam and reviewed and updated ROS and Plan today (5/4/2025). In review of yesterday's note (5/3/2025), there are no changes except as documented above.      Total time spent 51 minutes. I spent greater than 50% of the time for patient care, including unit/floor time, multiple face-to-face encounters with the patient, counseling on treatment plan and discussion with bedside RN.  Further, I spent time on my own review of patient's overnight events, RN notes, imaging and lab analysis, and developing my assessment and plan above.  In addition, working with , social workers, and Charge RN on case coordination on this date.    This note was generated using voice recognition software which has a chance of producing errors of grammar and content.  I have made every reasonable attempt to find and correct any errors, but it should be expected that some may not be found prior to finalization of this note.

## 2025-05-04 NOTE — CARE PLAN
Problem: Pain - Standard  Goal: Alleviation of pain or a reduction in pain to the patient’s comfort goal  Outcome: Progressing     Problem: Fall Risk  Goal: Patient will remain free from falls  Outcome: Progressing   The patient is Stable - Low risk of patient condition declining or worsening    Shift Goals  Clinical Goals: Monitor vitals, pain management, remain free from falls  Patient Goals: feel better  Family Goals: N/A    Progress made toward(s) clinical / shift goals:  Free from falls.Pain at tolerable level    Patient is not progressing towards the following goals:

## 2025-05-04 NOTE — PROGRESS NOTES
Subjective     Lauren Esquivel is a 74 y.o. female who presents with Abdominal Pain (X 3 days, pain on and off, Lower Lt side of abdomen, severe diarrhea today, pain goes away zipping a lot of water, last meal was this morning around 7 AM, feels weak: unsure if it's from pain/not eating a lot, shaking, chills )            Abdominal Pain  This is a new problem. Episode onset: pt reports new onset of LLQ abdominal pain that started several days ago. admits the pain is aching in nature. comes and goes, nothing seems to make it worse or better. reports new onset of diarrhea today, fever and chills. feels very weak. denies emesis. The problem has been unchanged. The pain is located in the LLQ. The quality of the pain is aching. The abdominal pain does not radiate. Associated symptoms include a fever. Pertinent negatives include no nausea or vomiting. Treatments tried: drinking water. The treatment provided no relief.       Review of Systems   Constitutional:  Positive for fever.   Gastrointestinal:  Positive for abdominal pain. Negative for nausea and vomiting.   All other systems reviewed and are negative.         Past Medical History:   Diagnosis Date    Anemia     Anxiety state, unspecified 1/3/2010    ASTHMA     Chronic rhinitis 1/3/2010    COPD     Elevated C-reactive protein (CRP) 2/6/2012    H/O pyelonephritis 8/21/2012    H/O: female infertility 8/10/2011    History of nephrolithiasis 8/21/2012    HTN     HTN (hypertension) 8/11/2010    Hyperglycemia 6/4/2012    Hyperlipidemia 8/28/2010    Influenza A with pneumonia 12/27/2016    [facility]'s admission 2016.     Kidney filling defect 8/21/2012    Laryngeal spasm 1/3/2010    Metabolic syndrome 6/4/2012    Mycoplasma pneumonia 2003    Obesity 6/4/2012    Preventative health care 8/11/2010    Pyelonephritis 7/30/2012    Respiratory malfunction arising from mental factors 1/3/2010    Sleep apnea     Vitamin d deficiency 8/28/2010    Wheeze 12/4/2009      Past  "Surgical History:   Procedure Laterality Date    LAPAROSCOPY      for infertility    TONSILLECTOMY AND ADENOIDECTOMY        Social History     Socioeconomic History    Marital status:      Spouse name: Not on file    Number of children: 2    Years of education: Not on file    Highest education level: Not on file   Occupational History     Employer: [employer]   Tobacco Use    Smoking status: Never    Smokeless tobacco: Never   Vaping Use    Vaping status: Never Used   Substance and Sexual Activity    Alcohol use: No     Alcohol/week: 0.0 oz    Drug use: No    Sexual activity: Yes     Partners: Male   Other Topics Concern    Not on file   Social History Narrative    Not on file     Social Drivers of Health     Financial Resource Strain: Not on file   Food Insecurity: Not on file   Transportation Needs: Not on file   Physical Activity: Not on file   Stress: Not on file   Social Connections: Not on file   Intimate Partner Violence: Not on file   Housing Stability: Not on file         Objective     /60 (BP Location: Left arm, Patient Position: Sitting, BP Cuff Size: Adult)   Pulse (!) 122   Temp (!) 38.8 °C (101.8 °F) (Temporal)   Resp 16   Ht 1.626 m (5' 4\")   Wt 96.2 kg (212 lb)   LMP 06/04/2005 (LMP Unknown)   SpO2 90%   BMI 36.39 kg/m²      Physical Exam  Vitals and nursing note reviewed.   Constitutional:       Appearance: Normal appearance. She is normal weight.   HENT:      Head: Normocephalic and atraumatic.      Nose: Nose normal.      Mouth/Throat:      Mouth: Mucous membranes are moist.      Pharynx: Oropharynx is clear.   Eyes:      Extraocular Movements: Extraocular movements intact.      Pupils: Pupils are equal, round, and reactive to light.   Cardiovascular:      Rate and Rhythm: Normal rate and regular rhythm.   Pulmonary:      Effort: Pulmonary effort is normal.   Abdominal:      Palpations: Abdomen is soft. There is no mass.      Tenderness: There is abdominal tenderness " in the left lower quadrant. There is guarding (voluntary). There is no rebound.   Musculoskeletal:         General: Normal range of motion.      Cervical back: Normal range of motion.   Skin:     General: Skin is warm and dry.      Capillary Refill: Capillary refill takes less than 2 seconds.   Neurological:      General: No focal deficit present.      Mental Status: She is alert and oriented to person, place, and time. Mental status is at baseline.   Psychiatric:         Mood and Affect: Mood normal.         Speech: Speech normal.         Thought Content: Thought content normal.         Judgment: Judgment normal.                                  Assessment & Plan  LLQ abdominal pain         Fever, unspecified fever cause         Tachycardia          DDX discussed with patient include but are not limited to viral gastritis, ileus, SBO, colitis, appendicitis, diverticulitis with abscess, peritonitis  She will be sent to the ER for a higher level of care this evening to determine etiology of fever and abd pain  She understands and agrees  Will travel by POV  Report called to transfer center

## 2025-05-04 NOTE — ED PROVIDER NOTES
ED Provider Note    CHIEF COMPLAINT  Chief Complaint   Patient presents with    Abdominal Pain     Pt brought back to triage in wheelchair c/o severe abd pain in her LLQ that started 3 days ago. Went to  today and due to high HR and fever or 102 they sent her here. Subjective fevers yesterday. + weakness and general malaise.     Diarrhea     Severe diarrhea today with incontinence.   Urinary frequency.        EXTERNAL RECORDS REVIEWED  Reviewed medication list baseline laboratory studies    HPI/ROS  LIMITATION TO HISTORY   None  OUTSIDE HISTORIAN(S):  None    Lauren Esquivel is a 74 y.o. female who presents for a constellation of symptoms including abdominal pain nonbloody diarrhea urinary frequency.  Patient was sent in from urgent care.  There she had a low-grade fever and elevated heart rate and they felt that she require higher level of care.  She denies any thoracoabdominal surgical history.  She does not endorse any recent antibiotic use or exotic or foreign travel.  No black or bloody stools.  No flank pain or productive cough.  She denies rash.  No severe headache neck stiffness.  No reported chest pain    PAST MEDICAL HISTORY   has a past medical history of Anemia, Anxiety state, unspecified (1/3/2010), ASTHMA, Chronic rhinitis (1/3/2010), COPD, Elevated C-reactive protein (CRP) (2/6/2012), H/O pyelonephritis (8/21/2012), H/O: female infertility (8/10/2011), History of nephrolithiasis (8/21/2012), HTN, HTN (hypertension) (8/11/2010), Hyperglycemia (6/4/2012), Hyperlipidemia (8/28/2010), Influenza A with pneumonia (12/27/2016), Kidney filling defect (8/21/2012), Laryngeal spasm (1/3/2010), Metabolic syndrome (6/4/2012), Mycoplasma pneumonia (2003), Obesity (6/4/2012), Preventative health care (8/11/2010), Pyelonephritis (7/30/2012), Respiratory malfunction arising from mental factors (1/3/2010), Sleep apnea, Vitamin d deficiency (8/28/2010), and Wheeze (12/4/2009).    SURGICAL HISTORY   has a past  "surgical history that includes laparoscopy and tonsillectomy and adenoidectomy.    FAMILY HISTORY  Family History   Problem Relation Age of Onset    Lung Disease Mother     Hypertension Mother     Heart Disease Father     Hypertension Maternal Grandfather     Stroke Brother     Heart Disease Brother        SOCIAL HISTORY  Social History     Tobacco Use    Smoking status: Never    Smokeless tobacco: Never   Vaping Use    Vaping status: Never Used   Substance and Sexual Activity    Alcohol use: No     Alcohol/week: 0.0 oz    Drug use: No    Sexual activity: Yes     Partners: Male       CURRENT MEDICATIONS  Home Medications    **Home medications have not yet been reviewed for this encounter**       Audit from Redirected Encounters    **Home medications have not yet been reviewed for this encounter**         ALLERGIES  Allergies   Allergen Reactions    Ace Inhibitors      Cough    Ciprofloxacin     Levaquin [Levofloxacin]        PHYSICAL EXAM  VITAL SIGNS: BP (!) 131/97   Pulse 84   Temp (!) 38.2 °C (100.8 °F) (Oral)   Resp 16   Ht 1.651 m (5' 5\")   Wt 98.9 kg (218 lb)   LMP 06/04/2005 (LMP Unknown)   SpO2 (!) 87%   BMI 36.28 kg/m²    Pulse ox interpretation: I interpret this pulse ox as normal.  Constitutional: Alert and oriented x 3, acute distress  HEENT: Atraumatic normocephalic, pupils are equal round reactive to light extraocular movements are intact. The nares is clear, external ears are normal, mouth shows moist mucous membranes normal dentition for age  Neck: Supple, no JVD no tracheal deviation  Cardiovascular: Regular rate and rhythm no murmur rub or gallop 2+ pulses peripherally x4  Thorax & Lungs: No respiratory distress, no wheezes rales or rhonchi, No chest tenderness.   GI: Soft nontender nondistended positive bowel sounds, no peritoneal signs rebound or guarding left flank pain noted  Skin: Warm dry no acute rash or lesion  Musculoskeletal: Moving all extremities with full range and 5 of 5 " strength no acute  deformity  Neurologic: Cranial nerves III through XII are grossly intact no sensory deficit no cerebellar dysfunction   Psychiatric: Appropriate affect for situation at this time          EKG/LABS  Results for orders placed or performed during the hospital encounter of 05/03/25   CBC with Differential    Collection Time: 05/03/25  6:20 PM   Result Value Ref Range    WBC 9.6 4.8 - 10.8 K/uL    RBC 4.48 4.20 - 5.40 M/uL    Hemoglobin 12.8 12.0 - 16.0 g/dL    Hematocrit 38.5 37.0 - 47.0 %    MCV 85.9 81.4 - 97.8 fL    MCH 28.6 27.0 - 33.0 pg    MCHC 33.2 32.2 - 35.5 g/dL    RDW 42.7 35.9 - 50.0 fL    Platelet Count 368 164 - 446 K/uL    MPV 9.8 9.0 - 12.9 fL    Neutrophils-Polys 72.00 44.00 - 72.00 %    Lymphocytes 13.30 (L) 22.00 - 41.00 %    Monocytes 12.00 0.00 - 13.40 %    Eosinophils 1.80 0.00 - 6.90 %    Basophils 0.50 0.00 - 1.80 %    Immature Granulocytes 0.40 0.00 - 0.90 %    Nucleated RBC 0.00 0.00 - 0.20 /100 WBC    Neutrophils (Absolute) 6.93 1.82 - 7.42 K/uL    Lymphs (Absolute) 1.28 1.00 - 4.80 K/uL    Monos (Absolute) 1.16 (H) 0.00 - 0.85 K/uL    Eos (Absolute) 0.17 0.00 - 0.51 K/uL    Baso (Absolute) 0.05 0.00 - 0.12 K/uL    Immature Granulocytes (abs) 0.04 0.00 - 0.11 K/uL    NRBC (Absolute) 0.00 K/uL   Complete Metabolic Panel    Collection Time: 05/03/25  6:20 PM   Result Value Ref Range    Sodium 137 135 - 145 mmol/L    Potassium 3.4 (L) 3.6 - 5.5 mmol/L    Chloride 102 96 - 112 mmol/L    Co2 19 (L) 20 - 33 mmol/L    Anion Gap 16.0 7.0 - 16.0    Glucose 119 (H) 65 - 99 mg/dL    Bun 15 8 - 22 mg/dL    Creatinine 0.83 0.50 - 1.40 mg/dL    Calcium 9.1 8.5 - 10.5 mg/dL    Correct Calcium 9.2 8.5 - 10.5 mg/dL    AST(SGOT) 21 12 - 45 U/L    ALT(SGPT) 25 2 - 50 U/L    Alkaline Phosphatase 49 30 - 99 U/L    Total Bilirubin 0.7 0.1 - 1.5 mg/dL    Albumin 3.9 3.2 - 4.9 g/dL    Total Protein 7.3 6.0 - 8.2 g/dL    Globulin 3.4 1.9 - 3.5 g/dL    A-G Ratio 1.1 g/dL   Lipase    Collection Time:  05/03/25  6:20 PM   Result Value Ref Range    Lipase 12 11 - 82 U/L   Lactic Acid    Collection Time: 05/03/25  6:20 PM   Result Value Ref Range    Lactic Acid 1.1 0.5 - 2.0 mmol/L   Blood Culture - Draw one from central line and one from peripheral site    Collection Time: 05/03/25  6:20 PM    Specimen: Peripheral; Blood   Result Value Ref Range    Significant Indicator NEG     Source BLD     Site PERIPHERAL     Culture Result       No Growth  Note: Blood cultures are incubated for 5 days and  are monitored continuously.Positive blood cultures  are called to the RN and reported as soon as  they are identified.  Blood culture testing and Gram stain, if indicated, are  performed at [facility],  [address] [place] [address].  Positive blood  cultures are sent to [facility],  [address] [place] [address], for organism identification  and susceptibility testing.     Blood Culture - Draw one from central line and one from peripheral site    Collection Time: 05/03/25  6:20 PM    Specimen: Line; Blood   Result Value Ref Range    Significant Indicator NEG     Source BLD     Site Peripheral     Culture Result       No Growth  Note: Blood cultures are incubated for 5 days and  are monitored continuously.Positive blood cultures  are called to the RN and reported as soon as  they are identified.  Blood culture testing and Gram stain, if indicated, are  performed at [facility],  [address] [place] [address].  Positive blood  cultures are sent to [facility],  [address] [place] [address], for organism identification  and susceptibility testing.     PROCALCITONIN    Collection Time: 05/03/25  6:20 PM   Result Value Ref Range    Procalcitonin 0.17 <0.25 ng/mL   ESTIMATED GFR    Collection Time: 05/03/25  6:20 PM   Result Value Ref Range    GFR (CKD-EPI) 73 >60 mL/min/1.73 m 2   Urinalysis    Collection Time: 05/03/25  9:10 PM     Specimen: Urine   Result Value Ref Range    Micro Urine Req Microscopic      *Note: Due to a large number of results and/or encounters for the requested time period, some results have not been displayed. A complete set of results can be found in Results Review.       I have independently interpreted this EKG    RADIOLOGY/PROCEDURES   I have independently interpreted the diagnostic imaging associated with this visit and am waiting the final reading from the radiologist.   My preliminary interpretation is as follows: Chest x-ray is clear, possible inflammation of the left renal collecting system    Radiologist interpretation:  CT-ABDOMEN-PELVIS WITH   Final Result      1.  Urothelial hyperenhancement in the left ureter and left renal pelvis. Please correlate for ascending UTI.   2.  No other acute inflammatory process in the abdomen or pelvis.      DX-CHEST-PORTABLE (1 VIEW)   Final Result      No acute cardiopulmonary abnormality.             COURSE & MEDICAL DECISION MAKING    ASSESSMENT, COURSE AND PLAN  Care Narrative:     This is a very pleasant 74-year-old who presents here with SIRS.  She had low-grade fever tachycardia and septic protocol was immediately initiated.  I administered a single fluid bolus and did not feel that she required full sepsis bolus because she was not hypotensive had no evidence of endorgan hypoperfusion and lactic acid was normal.  Here her workup is reassuring and that there is no leukocytosis or bandemia.  Metabolic panel demonstrates slightly low potassium but no metabolic acidosis or evidence of acute kidney injury.  Procalcitonin is reassuring lactic acid is normal.  Chest x-ray is clear.  CT scan does not demonstrate any acute surgical process but there is enhancement of the left collecting system concerning for UTI.  There is significant delay in obtaining urine due to the patient providing a sample.  She was given IV Rocephin to cover for possible sepsis.  This clearly  demonstrates nitrite positive UTI.  Both blood and urine cultures are pending.  Patient will be given an additional bolus of normal saline.  She will be admitted to the hospitalist service.  I considered but did not feel that pressors or full sepsis bolus is indicated.    Hydration: Based on the patient's presentation of Acute Vomiting the patient was given IV fluids. IV Hydration was used because oral hydration was not adequate alone. Upon recheck following hydration, the patient was approved.          ADDITIONAL PROBLEMS MANAGED      DISPOSITION AND DISCUSSIONS  I have discussed management of the patient with the following physicians and VANITA's: Discussed plan of care with admitting hospitalist    Discussion of management with other QHP or appropriate source(s): None    Escalation of care considered, and ultimately not performed: Considered full sepsis bolus    Barriers to care at this time, including but not limited to: None.     Decision tools and prescription drugs considered including, but not limited to: None.    FINAL DIAGNOSIS  1. Pyelonephritis        2. SIRS (systemic inflammatory response syndrome) (HCC)        3. Hypoxia               Electronically signed by: Pritesh Villagomez M.D., 5/3/2025 6:07 PM

## 2025-05-04 NOTE — HOSPITAL COURSE
74-year-old female with a past medical history of hypertension, hyperlipidemia, prediabetes, SENTHIL with CPAP, asthma, admitted on 5/3/2025 for subjective fevers, diarrhea, left lower quadrant abdominal pain.  In the ER patient was found to have fever of 100.8 F, tachycardia , hypoxic to 87% on room air requiring 2 L nasal cannula.  UA was positive for urinary tract infection, dehydration.  Labs also showed hypokalemia but no leukocytosis.  Reviewed CT scan, there is a large right renal cyst, left renal pelvis enhancement with distended ureter without stone possibly distal ureteral stricture. This is consistent with pyelonephritis. Patient was started on IV Ancef. Risk factors include weight, age, female.

## 2025-05-04 NOTE — ASSESSMENT & PLAN NOTE
On my evaluation, patient went down to 86% while we are discussing case.  She was not on oxygen.  I placed her on 2 L.  Patient states she also has asthma  Continue nocturnal CPAP

## 2025-05-04 NOTE — ASSESSMENT & PLAN NOTE
UA significantly positive, CT scan consistent with left pyelonephritis.      Urine culture is pansensitive E. coli.  I ordered p.o. cefuroxime for outpatient to complete treatment.  Continue IV Ancef here

## 2025-05-04 NOTE — PROGRESS NOTES
Medication history reviewed with pt. Med rec is complete.      Pt reports that she has not used her ALBUTEROL inhaler or Nebu in the last 30 days or longer.   Pt reports that she has not picked up her MECLIZINE 12.5MG from the pharmacy yet.    Patient has not had any outpatient antibiotics in the last 30 days.    Pt is not on any anticoagulants      Dispense history available in EPIC? YES

## 2025-05-04 NOTE — H&P
Hospital Medicine History & Physical Note    Date of Service  5/3/2025    Primary Care Physician  Yessy Paz M.D.    Consultants  None    Code Status  Full Code    Chief Complaint  Chief Complaint   Patient presents with    Abdominal Pain     Pt brought back to triage in wheelchair c/o severe abd pain in her LLQ that started 3 days ago. Went to  today and due to high HR and fever or 102 they sent her here. Subjective fevers yesterday. + weakness and general malaise.     Diarrhea     Severe diarrhea today with incontinence.   Urinary frequency.        History of Presenting Illness  Lauren Esquivel is a 74 y.o. female, with h/o HTN, HLD who presented to the emergency department on 5/3/2025 with complains of severe abdominal pain and left lower quadrant for the last 3 days.  Tonight, also has a fever of 102.  She has generalized malaise and overall feeling unwell.  She also has associated dysuria, urinary frequency and a few episodes of watery diarrhea during the day.  Patient also mention she is currently undergoing increased distress.  She will be having her 's memorial service tomorrow.    I discussed the plan of care with patient and ERP Dr. Villagomez .    Review of Systems  Review of Systems   Constitutional:  Positive for chills and malaise/fatigue. Negative for fever.   HENT:  Negative for congestion and sore throat.    Eyes:  Negative for blurred vision and double vision.   Respiratory:  Negative for cough and shortness of breath.    Cardiovascular:  Negative for chest pain and palpitations.   Gastrointestinal:  Positive for abdominal pain and nausea. Negative for vomiting.   Genitourinary:  Negative for dysuria and urgency.   Musculoskeletal:  Negative for myalgias and neck pain.   Skin:  Negative for itching and rash.   Neurological:  Negative for dizziness, weakness and headaches.   Endo/Heme/Allergies:  Does not bruise/bleed easily.   Psychiatric/Behavioral:  Negative for depression. The  patient does not have insomnia.        Past Medical History   has a past medical history of Anemia, Anxiety state, unspecified (1/3/2010), ASTHMA, Chronic rhinitis (1/3/2010), COPD, Elevated C-reactive protein (CRP) (2/6/2012), H/O pyelonephritis (8/21/2012), H/O: female infertility (8/10/2011), History of nephrolithiasis (8/21/2012), HTN, HTN (hypertension) (8/11/2010), Hyperglycemia (6/4/2012), Hyperlipidemia (8/28/2010), Influenza A with pneumonia (12/27/2016), Kidney filling defect (8/21/2012), Laryngeal spasm (1/3/2010), Metabolic syndrome (6/4/2012), Mycoplasma pneumonia (2003), Obesity (6/4/2012), Preventative health care (8/11/2010), Pyelonephritis (7/30/2012), Respiratory malfunction arising from mental factors (1/3/2010), Sleep apnea, Vitamin d deficiency (8/28/2010), and Wheeze (12/4/2009).    Surgical History   has a past surgical history that includes laparoscopy and tonsillectomy and adenoidectomy.     Family History  family history includes Heart Disease in her brother and father; Hypertension in her maternal grandfather and mother; Lung Disease in her mother; Stroke in her brother.   Family history reviewed with patient. There is no family history that is pertinent to the chief complaint.     Social History   reports that she has never smoked. She has never used smokeless tobacco. She reports that she does not drink alcohol and does not use drugs.    Allergies  Allergies   Allergen Reactions    Ace Inhibitors      Cough    Ciprofloxacin     Levaquin [Levofloxacin]        Medications  Prior to Admission Medications   Prescriptions Last Dose Informant Patient Reported? Taking?   albuterol (PROVENTIL) 2.5mg/3ml Nebu Soln solution for nebulization   No No   Sig: Take 3 mL by nebulization every four hours as needed for Shortness of Breath.   albuterol 108 (90 Base) MCG/ACT Aero Soln inhalation aerosol   No No   Sig: Inhale 2 Puffs every 6 hours as needed for Shortness of Breath.   fluticasone-salmeterol  (WIXELA INHUB) 250-50 MCG/ACT AEROSOL POWDER, BREATH ACTIVATED   No No   Sig: Inhale 1 Puff every 12 hours.   meclizine (ANTIVERT) 12.5 MG Tab   No No   Sig: Take 1 Tablet by mouth at bedtime as needed for Dizziness.   Patient not taking: Reported on 5/3/2025   metFORMIN ER (GLUCOPHAGE XR) 500 MG TABLET SR 24 HR   No No   Sig: Take 1 Tablet by mouth 2 times a day.   rosuvastatin (CRESTOR) 20 MG Tab   No No   Sig: Take 1 Tablet by mouth every evening.   telmisartan (MICARDIS) 40 MG Tab   No No   Sig: Take 1 Tablet by mouth every day.   triamcinolone acetonide (KENALOG) 0.1 % Ointment   No No   Sig: Apply 1 Application topically 2 times a day.   Patient not taking: Reported on 5/3/2025      Facility-Administered Medications: None       Physical Exam  Temp:  [38.2 °C (100.8 °F)-38.8 °C (101.8 °F)] 38.2 °C (100.8 °F)  Pulse:  [] 84  Resp:  [16] 16  BP: (108-131)/(60-97) 131/97  SpO2:  [87 %-91 %] 87 %  Blood Pressure : (!) 131/97   Temperature: (!) 38.2 °C (100.8 °F)   Pulse: 84   Respiration: 16   Pulse Oximetry: (!) 87 %       Physical Exam  Constitutional:       Appearance: Normal appearance.   HENT:      Head: Normocephalic and atraumatic.      Mouth/Throat:      Mouth: Mucous membranes are moist.      Pharynx: Oropharynx is clear.   Eyes:      Extraocular Movements: Extraocular movements intact.      Pupils: Pupils are equal, round, and reactive to light.   Cardiovascular:      Rate and Rhythm: Normal rate and regular rhythm.      Heart sounds: Normal heart sounds.   Pulmonary:      Effort: Pulmonary effort is normal.      Breath sounds: Normal breath sounds.   Abdominal:      General: Abdomen is flat. Bowel sounds are normal.      Palpations: Abdomen is soft.      Tenderness: There is abdominal tenderness (Minimal pain over pelvic area on palpation). There is no guarding or rebound.   Musculoskeletal:      Cervical back: Normal range of motion and neck supple.   Skin:     General: Skin is warm and dry.    Neurological:      General: No focal deficit present.      Mental Status: She is alert and oriented to person, place, and time.   Psychiatric:         Mood and Affect: Mood normal.         Behavior: Behavior normal.         Laboratory:  Recent Labs     05/03/25  1820   WBC 9.6   RBC 4.48   HEMOGLOBIN 12.8   HEMATOCRIT 38.5   MCV 85.9   MCH 28.6   MCHC 33.2   RDW 42.7   PLATELETCT 368   MPV 9.8     Recent Labs     05/03/25  1820   SODIUM 137   POTASSIUM 3.4*   CHLORIDE 102   CO2 19*   GLUCOSE 119*   BUN 15   CREATININE 0.83   CALCIUM 9.1     Recent Labs     05/03/25  1820   ALTSGPT 25   ASTSGOT 21   ALKPHOSPHAT 49   TBILIRUBIN 0.7   LIPASE 12   GLUCOSE 119*             Imaging:  CT-ABDOMEN-PELVIS WITH   Final Result      1.  Urothelial hyperenhancement in the left ureter and left renal pelvis. Please correlate for ascending UTI.   2.  No other acute inflammatory process in the abdomen or pelvis.      DX-CHEST-PORTABLE (1 VIEW)   Final Result      No acute cardiopulmonary abnormality.             X-Ray:  I have personally reviewed the images and compared with prior images. and My impression is: CT Abdomen and Pelvis showed UTI findings    Assessment/Plan:  Justification for Admission Status  I anticipate this patient will require at least two midnights for appropriate medical management, necessitating inpatient admission because 73 yo F with Pyelonephritis and SIRS      * Pyelonephritis- (present on admission)  Assessment & Plan  -Inpatient status on medical floor.  - Patient febrile and tachycardic, has acute UTI with changes of ascending UTI showing urothelial and left ureter and left renal pelvis hyperenhancement/pyelonephritis on CT of the abdomen and pelvis.  There is no infected stone.  - Patient was a started on IV antibiotics.  There is no endorgan dysfunction and she is not septic on admission.    Hypoxia  Assessment & Plan  - Patient was hypoxic in the emergency department with O2 saturations of 87%.  -  She does have sleep apnea and nocturnal hypoxia.  I also will add COVID/RSV/influenza panel.  She is not feeling short of breath.    Hypokalemia  Assessment & Plan  -Potassium was 3.4 on admission.  I am adding oral potassium replacement and will recheck potassium in the morning.    Prediabetes- (present on admission)  Assessment & Plan  - Her glucose is 119 on admission.  Monitor chemistry panel in the morning.    SENTHIL on CPAP- (present on admission)  Assessment & Plan  - She is currently on 2 L of oxygen via nasal cannula as she had an episode of hypoxia at 87% of oxygen on room air.  - I also added nocturnal CPAP, unclear if hypoxia was related to nocturnal hypoxia.    Hyperlipidemia- (present on admission)  Assessment & Plan  - Continue rosuvastatin.    HTN (hypertension)- (present on admission)  Assessment & Plan  - Continue telmisartan.        VTE prophylaxis: SCDs/TEDs

## 2025-05-04 NOTE — PROGRESS NOTES
4 Eyes Skin Assessment Completed by CECI Chiang and Ma, RN.    Head WDL  Ears WDL  Nose WDL  Mouth WDL  Neck WDL  Breast/Chest Redness and Blanching  Shoulder Blades WDL  Spine WDL  (R) Arm/Elbow/Hand Bruising  (L) Arm/Elbow/Hand WDL  Abdomen Redness and Blanching  Groin WDL  Scrotum/Coccyx/Buttocks WDL  (R) Leg Bruising  (L) Leg Bruising  (R) Heel/Foot/Toe Redness and Blanching  (L) Heel/Foot/Toe Redness and Blanching          Devices In Places Blood Pressure Cuff, Pulse Ox, and Nasal Cannula      Interventions In Place Pillows    Possible Skin Injury No    Pictures Uploaded Into Epic N/A  Wound Consult Placed N/A  RN Wound Prevention Protocol Ordered No

## 2025-05-04 NOTE — CARE PLAN
The patient is Stable - Low risk of patient condition declining or worsening    Shift Goals  Clinical Goals: wean off of oxygen; tolerate IV antibiotics; free from falls / injuries  Patient Goals: rest; get better  Family Goals: n/a    Progress made toward(s) clinical / shift goals:  O2 weaned down; free from falls / injuries; tolerating IV Abx    Patient is not progressing towards the following goals:

## 2025-05-04 NOTE — PROGRESS NOTES
Received bedside report from night RN. Patient A&Ox4. Reports some nausea, declines intervention. Offered saltine crackers. Discussed POC and understood. Safety and fall precaution in place. Call light placed within reach with instructions to call anytime for assistance. All needs met at this time.

## 2025-05-04 NOTE — PROGRESS NOTES
Patient received from ED by staff. Assumed care of patient. Patient walked from gurney to bed with steady gait. AxOx4 on 2L NC. Oriented to room and call device. Chart, labs, and orders reviewed. Pt resting in bed, breathing even and unlabored, denies pain and in no acute distress.. Fall precautions  in place and education provided. Call light within reach, bed locked and in lowest position, denies other needs at this time. Hourly rounding in progress.

## 2025-05-05 LAB
ALBUMIN SERPL BCP-MCNC: 3.4 G/DL (ref 3.2–4.9)
ANION GAP SERPL CALC-SCNC: 14 MMOL/L (ref 7–16)
BUN SERPL-MCNC: 9 MG/DL (ref 8–22)
CALCIUM ALBUM COR SERPL-MCNC: 9.2 MG/DL (ref 8.5–10.5)
CALCIUM SERPL-MCNC: 8.7 MG/DL (ref 8.5–10.5)
CHLORIDE SERPL-SCNC: 103 MMOL/L (ref 96–112)
CO2 SERPL-SCNC: 21 MMOL/L (ref 20–33)
CREAT SERPL-MCNC: 0.67 MG/DL (ref 0.5–1.4)
GFR SERPLBLD CREATININE-BSD FMLA CKD-EPI: 91 ML/MIN/1.73 M 2
GLUCOSE BLD STRIP.AUTO-MCNC: 122 MG/DL (ref 65–99)
GLUCOSE SERPL-MCNC: 121 MG/DL (ref 65–99)
MAGNESIUM SERPL-MCNC: 1.9 MG/DL (ref 1.5–2.5)
PHOSPHATE SERPL-MCNC: 3.5 MG/DL (ref 2.5–4.5)
POTASSIUM SERPL-SCNC: 3 MMOL/L (ref 3.6–5.5)
SODIUM SERPL-SCNC: 138 MMOL/L (ref 135–145)

## 2025-05-05 PROCEDURE — A9270 NON-COVERED ITEM OR SERVICE: HCPCS | Performed by: HOSPITALIST

## 2025-05-05 PROCEDURE — A9270 NON-COVERED ITEM OR SERVICE: HCPCS | Performed by: INTERNAL MEDICINE

## 2025-05-05 PROCEDURE — 770001 HCHG ROOM/CARE - MED/SURG/GYN PRIV*

## 2025-05-05 PROCEDURE — 80069 RENAL FUNCTION PANEL: CPT

## 2025-05-05 PROCEDURE — 700102 HCHG RX REV CODE 250 W/ 637 OVERRIDE(OP): Mod: JZ | Performed by: HOSPITALIST

## 2025-05-05 PROCEDURE — 82962 GLUCOSE BLOOD TEST: CPT

## 2025-05-05 PROCEDURE — 99233 SBSQ HOSP IP/OBS HIGH 50: CPT | Performed by: HOSPITALIST

## 2025-05-05 PROCEDURE — 94760 N-INVAS EAR/PLS OXIMETRY 1: CPT

## 2025-05-05 PROCEDURE — 94660 CPAP INITIATION&MGMT: CPT

## 2025-05-05 PROCEDURE — 36415 COLL VENOUS BLD VENIPUNCTURE: CPT

## 2025-05-05 PROCEDURE — A9270 NON-COVERED ITEM OR SERVICE: HCPCS | Mod: JZ | Performed by: HOSPITALIST

## 2025-05-05 PROCEDURE — 700105 HCHG RX REV CODE 258: Performed by: HOSPITALIST

## 2025-05-05 PROCEDURE — 700102 HCHG RX REV CODE 250 W/ 637 OVERRIDE(OP): Performed by: INTERNAL MEDICINE

## 2025-05-05 PROCEDURE — 700102 HCHG RX REV CODE 250 W/ 637 OVERRIDE(OP): Performed by: HOSPITALIST

## 2025-05-05 PROCEDURE — 83735 ASSAY OF MAGNESIUM: CPT

## 2025-05-05 PROCEDURE — 700111 HCHG RX REV CODE 636 W/ 250 OVERRIDE (IP): Performed by: HOSPITALIST

## 2025-05-05 RX ORDER — POTASSIUM CHLORIDE 1500 MG/1
40 TABLET, EXTENDED RELEASE ORAL ONCE
Status: COMPLETED | OUTPATIENT
Start: 2025-05-05 | End: 2025-05-05

## 2025-05-05 RX ADMIN — CEFAZOLIN 2 G: 2 INJECTION, POWDER, FOR SOLUTION INTRAMUSCULAR; INTRAVENOUS at 14:00

## 2025-05-05 RX ADMIN — SENNOSIDES AND DOCUSATE SODIUM 2 TABLET: 50; 8.6 TABLET ORAL at 18:01

## 2025-05-05 RX ADMIN — POTASSIUM CHLORIDE 40 MEQ: 1500 TABLET, EXTENDED RELEASE ORAL at 09:15

## 2025-05-05 RX ADMIN — CEFAZOLIN 2 G: 2 INJECTION, POWDER, FOR SOLUTION INTRAMUSCULAR; INTRAVENOUS at 21:43

## 2025-05-05 RX ADMIN — ROSUVASTATIN CALCIUM 20 MG: 10 TABLET, FILM COATED ORAL at 18:00

## 2025-05-05 RX ADMIN — TELMISARTAN 40 MG: 40 TABLET ORAL at 05:43

## 2025-05-05 RX ADMIN — CEFAZOLIN 2 G: 2 INJECTION, POWDER, FOR SOLUTION INTRAMUSCULAR; INTRAVENOUS at 05:45

## 2025-05-05 RX ADMIN — RIVAROXABAN 10 MG: 10 TABLET, FILM COATED ORAL at 18:01

## 2025-05-05 ASSESSMENT — ENCOUNTER SYMPTOMS
PALPITATIONS: 0
NAUSEA: 0
BACK PAIN: 0
DIZZINESS: 0
HEADACHES: 0
ABDOMINAL PAIN: 0
CONSTIPATION: 0
DIARRHEA: 0
FLANK PAIN: 1
SHORTNESS OF BREATH: 0
VOMITING: 0
CHILLS: 0
COUGH: 0
FEVER: 1

## 2025-05-05 ASSESSMENT — PATIENT HEALTH QUESTIONNAIRE - PHQ9
SUM OF ALL RESPONSES TO PHQ9 QUESTIONS 1 AND 2: 0
2. FEELING DOWN, DEPRESSED, IRRITABLE, OR HOPELESS: NOT AT ALL
1. LITTLE INTEREST OR PLEASURE IN DOING THINGS: NOT AT ALL

## 2025-05-05 ASSESSMENT — PAIN DESCRIPTION - PAIN TYPE
TYPE: ACUTE PAIN
TYPE: ACUTE PAIN

## 2025-05-05 NOTE — RESPIRATORY CARE
"   COPD EDUCATION by COPD CLINICAL EDUCATOR  5/5/2025 at 2:36 PM by Michell Aragon RRT     Patient reviewed by COPD education team. Patient does have a history or diagnosis of COPD and is a non-smoker.  A Short intervention was completed with this patient covering: What is COPD (how the lungs work), daily medications rescue and maintenance, breathing techniques, infection prevention and oxygen safety were covered in detail.  A comprehensive packet including information about COPD, treatments, and oxygen safety was given.  A personalized \"COPD Action Plan\" was reviewed with and provided to the patient.    COPD Screen  COPD Risk Screening  Do you have a history of COPD?: Yes (Pt last PFT 8/9/2008 FEV1/FVC 73% note said mild COPD, pt has long hx Asthma, Never smoked by lived w/a smoker w/ 2nd hand exposure)    COPD Assessment  COPD Clinical Specialists ONLY  COPD Education Initiated: Yes--Short Intervention (Pt last PFT 8/9/2008 FEV1/FVC 73% note said mild COPD, pt has long hx Asthma, Never smoked by lived w/a smoker w/ 2nd hand exposure, pt has appt w/ Pulmonary to establish, did action plan, pt has spacer)  Is this a COPD exacerbation patient?: No  DME Equipment Type: CPAP  Physician Name: Yessy Paz M.D.  Pulmonary Follow Up Appointment: 05/12/25  Appt Time: 0855  Pulmonologist Name: Radha Harris  Referrals Initiated: Yes  Pulmonary Rehab: N/A  Smoking Cessation: N/A  Hospice: N/A  Home Health Care: N/A  Mobile Urgent Care Services: N/A  Geriatric Specialty Group: N/A  Private In-Home Care Agency: N/A  Interdisciplinary Rounds: Attendance at Rounds (30 Min)    PFT Results    No results found for: \"PFT\" 8/9/2008   INTERPRETATION:  Spirometry reveals FVC of 2.58 liters, mildly reduced  at 79% predicted.  FEV1 of 1.89 liters at 71% predicted and also  mildly reduced.  FEV1/FVC ratio borderline mildly reduced at 73%  predicted.  RBV54-17% is 44% of predicted, moderate to severely  reduced.  There is " "no improvement post bronchodilator.  MVV is normal.  There is no dynamic airway collapse identified.  Total lung capacity  is 4.84 liters at 90% of predicted.  Borderline mild elevation of  RV/TLC ratio suggest mild hyperinflation.  Diffusion capacity is  normal and corrects to supranormal when corrected for alveolar volume.  Airways resistance is normal.  Effort was good.  The patient had  difficult time with test secondary to persistent cough.  Flow volume  loop is consistent with above data.    ASSESSMENT:  1. Mild obstructive lung disease.  2. Mild hyperinflation air trapping.  3. Normal gas transfer.       Meds to Beds  [facility] provides bedside medication delivery for all eligible patients at discharge and you have been automatically enrolled in the Meds to Beds Program. Would you like to opt out of this program for any reason?: No - Stay Opted In     MY COPD ACTION PLAN     It is recommended that patients and physicians/healthcare providers complete this action plan together. This plan should be discussed at each physician visit and updated as needed.    The green, yellow and red zones show groups of symptoms of COPD. This list of symptoms is not comprehensive, and you may experience other symptoms. In the \"Actions\" column, your healthcare provider has recommended actions for you to take based on your symptoms.    Patient Name: Lauren Esquivel   YOB: 1950   Last Updated on: 5/5/2025  2:35 PM   Green Zone:  I am doing well today Actions     Usual activitiy and exercise level   Take daily medications     Usual amounts of cough and phlegm/mucus   Use oxygen as prescribed     Sleep well at night   Continue regular exercise/diet plan     Appetite is good   At all times avoid cigarette smoke, inhaled irritants     Daily Medications (these medications are taken every day):   Budesonide-Formoterol Fumarate (Symbicort)  Albuterol (Accuneb, Proair, Proventil, Ventolin) 2 Puffs  2 Puffs Twice " "daily  Every 4 hours PRN     Additional Information:  Use spacer with Rescue inhaler and Symbicort, rinse after use!      Yellow Zone:  I am having a bad day or a COPD flare Actions     More breathless than usual   Continue daily medications     I have less energy for my daily activities   Use quick relief inhaler as ordered     Increased or thicker phlegm/mucus   Use oxygen as prescribed     Using quick relief inhaler/nebulizer more often   Get plenty of rest     Swelling of ankles more than usual   Use pursed lip breathing     More coughing than usual   At all times avoid cigarette smoke, inhaled irritants     I feel like I have a \"chest cold\"     Poor sleep and my symptoms woke me up     My appetite is not good     My medicine is not helping      Call provider immediately if symptoms don’t improve     Continue daily medications, add rescue medications:   Albuterol 3mL via nebulizer Every 4 hours       Medications to be used during a flare up, (as Discussed with Provider):           Additional Information:  Call provider immediately if symptoms do not improve!      Patient instructed on importance of cleaning home nebulizer after every treatment to prevent infection.      Red Zone:  I need urgent medical care Actions     Severe shortness of breath even at rest   Call 911 or seek medical care immediately     Not able to do any activity because of breathing      Fever or shaking chills      Feeling confused or very drowsy       Chest pains      Coughing up blood                  "

## 2025-05-05 NOTE — PROGRESS NOTES
"Patient politely refusing CPAP at this time and states \"it's too heavy\".  1 liter oxygen per NC applied.  Patient is sitting up in bed watching television and denies further needs.   "

## 2025-05-05 NOTE — CARE PLAN
Problem: Pain - Standard  Goal: Alleviation of pain or a reduction in pain to the patient’s comfort goal  Outcome: Progressing     Problem: Fall Risk  Goal: Patient will remain free from falls  Outcome: Progressing   The patient is Stable - Low risk of patient condition declining or worsening    Shift Goals  Clinical Goals: wean oxygen,remain free from falls,IV abx,walking o2  Patient Goals: rest  Family Goals: n/a    Progress made toward(s) clinical / shift goals:  Remained free from falls>pain at tolerable level    Patient is not progressing towards the following goals:

## 2025-05-05 NOTE — PROGRESS NOTES
Informed patient of the need to do a walking O2 test. Patient agreeable at a later time around 1900H to 2000H as she just ate. Will endorse to NOC RN.

## 2025-05-05 NOTE — PROGRESS NOTES
[facility] Medicine Daily Progress Note    Date of Service  5/5/2025    Chief Complaint  Lauren Esquivel is a 74 y.o. female admitted 5/3/2025 with   Chief Complaint   Patient presents with    Abdominal Pain     Pt brought back to triage in wheelchair c/o severe abd pain in her LLQ that started 3 days ago. Went to UC today and due to high HR and fever or 102 they sent her here. Subjective fevers yesterday. + weakness and general malaise.     Diarrhea     Severe diarrhea today with incontinence.   Urinary frequency.      Hospital Course  74-year-old female with a past medical history of hypertension, hyperlipidemia, prediabetes, SENTHIL with CPAP, asthma, admitted on 5/3/2025 for subjective fevers, diarrhea, left lower quadrant abdominal pain.  In the ER patient was found to have fever of 100.8 F, tachycardia , hypoxic to 87% on room air requiring 2 L nasal cannula.  UA was positive for urinary tract infection, dehydration.  Labs also showed hypokalemia but no leukocytosis.  Reviewed CT scan, there is a large right renal cyst, left renal pelvis enhancement with distended ureter without stone possibly distal ureteral stricture. This is consistent with pyelonephritis. Patient was started on IV Ancef. Risk factors include weight, age, female.    Interval Problem Update    Pt states she has been feeling more fatigued at home for the last week, feeling more exhausted trying to get around.  She developed left sided abdominal pains days ago.  She reports she does have urinary incontinence, has to use leak pad.   I explained to patient this could be a source of UTI if she is incontinent and any fecal matter that is in the anal area can moved to the urethra.  UA significantly positive, CT scan consistent with left pyelonephritis.  I changed antibiotics to IV Ancef.  We are pending urine cultures  On my evaluation, patient went down to 86% while we are discussing case.  She was not on oxygen.  I placed her on 2  L.          5/5 patient is new to me today, she is in bed she is alert oriented follows commands, she is eager to go home, urine culture positive for E. coli pending sensitivity, continue antibiotics for now, hopefully will be able to DC home once final culture result is available, reviewed note from previous hospitalist, discussed with bedside nurse charge nurse  pharmacist request have been answered      I have discussed this patient's plan of care and discharge plan at IDT rounds today with Case Management, Nursing, Nursing leadership, and other members of the IDT team.    Consultants/Specialty  none    Code Status  Full Code    Disposition  The patient is not medically cleared for discharge to home or a post-acute facility.      I have placed the appropriate orders for post-discharge needs.    Review of Systems  Review of Systems   Constitutional:  Positive for fever and malaise/fatigue. Negative for chills.   Respiratory:  Negative for cough and shortness of breath.    Cardiovascular:  Negative for chest pain and palpitations.   Gastrointestinal:  Negative for abdominal pain, constipation, diarrhea, nausea and vomiting.   Genitourinary:  Positive for flank pain.   Musculoskeletal:  Negative for back pain and joint pain.   Neurological:  Negative for dizziness and headaches.        Physical Exam  Temp:  [36.4 °C (97.6 °F)-37.3 °C (99.2 °F)] 36.5 °C (97.7 °F)  Pulse:  [] 75  Resp:  [16-18] 17  BP: (139-160)/(61-89) 152/89  SpO2:  [84 %-95 %] 92 %    Physical Exam  Vitals and nursing note reviewed.   Constitutional:       General: She is not in acute distress.     Appearance: She is obese. She is not ill-appearing.   Eyes:      General: No scleral icterus.     Extraocular Movements: Extraocular movements intact.   Cardiovascular:      Rate and Rhythm: Normal rate.      Pulses: Normal pulses.      Heart sounds: Normal heart sounds.      Comments: Difficult to completely auscultate due to body  habitus  Pulmonary:      Effort: Pulmonary effort is normal. No respiratory distress.      Breath sounds: Normal breath sounds.   Abdominal:      General: Bowel sounds are normal. There is no distension.      Tenderness: There is no right CVA tenderness or left CVA tenderness.   Musculoskeletal:         General: No swelling or tenderness. Normal range of motion.      Cervical back: Normal range of motion and neck supple.      Right lower leg: No edema.      Left lower leg: No edema.   Skin:     General: Skin is warm.      Capillary Refill: Capillary refill takes less than 2 seconds.      Coloration: Skin is not jaundiced.      Findings: No erythema.   Neurological:      General: No focal deficit present.      Mental Status: She is alert and oriented to person, place, and time.      Motor: No weakness.   Psychiatric:         Mood and Affect: Mood normal.         Behavior: Behavior normal.         Fluids    Intake/Output Summary (Last 24 hours) at 5/5/2025 1538  Last data filed at 5/5/2025 1500  Gross per 24 hour   Intake 680 ml   Output --   Net 680 ml        Laboratory  Recent Labs     05/03/25  1820 05/04/25  0358   WBC 9.6 7.8   RBC 4.48 4.35   HEMOGLOBIN 12.8 12.2   HEMATOCRIT 38.5 38.2   MCV 85.9 87.8   MCH 28.6 28.0   MCHC 33.2 31.9*   RDW 42.7 44.5   PLATELETCT 368 314   MPV 9.8 9.3     Recent Labs     05/03/25  1820 05/04/25  0358 05/05/25  0548   SODIUM 137 140 138   POTASSIUM 3.4* 3.2* 3.0*   CHLORIDE 102 105 103   CO2 19* 21 21   GLUCOSE 119* 108* 121*   BUN 15 13 9   CREATININE 0.83 0.69 0.67   CALCIUM 9.1 8.6 8.7     Recent Labs     05/03/25  1820   INR 1.03               Imaging  CT-ABDOMEN-PELVIS WITH   Final Result      1.  Urothelial hyperenhancement in the left ureter and left renal pelvis. Please correlate for ascending UTI.   2.  No other acute inflammatory process in the abdomen or pelvis.      DX-CHEST-PORTABLE (1 VIEW)   Final Result      No acute cardiopulmonary abnormality.               Assessment/Plan  * Pyelonephritis- (present on admission)  Assessment & Plan  UA significantly positive, CT scan consistent with left pyelonephritis.    I changed antibiotics to IV Ancef.    We are pending urine cultures    Urine culture positive for E. coli pending sensitivity    Hypoxia- (present on admission)  Assessment & Plan  Pt on RA, was 86% while discussing at bedside.  I placed her back on 2LNC  I ordered home oxygen evaluation.    Hypokalemia- (present on admission)  Assessment & Plan  K 3.2, continue PO replacements    Prediabetes- (present on admission)  Assessment & Plan  A1c's have not cross 6.4  Hold home metformin    Hyperlipidemia- (present on admission)  Assessment & Plan  Continue rosuvastatin    HTN (hypertension)- (present on admission)  Assessment & Plan  - Continue telmisartan    SENTHIL on CPAP- (present on admission)  Assessment & Plan  On my evaluation, patient went down to 86% while we are discussing case.  She was not on oxygen.  I placed her on 2 L.  Patient states she also has asthma         VTE prophylaxis: xarelto    I have performed a physical exam and reviewed and updated ROS and Plan today (5/5/2025). In review of yesterday's note (5/4/2025), there are no changes except as documented above.    Total time of 52 minutes spent prepping to see patient (e.g. reviewing  tests/imaging results, notes from consultants, bedside nurse, night shift ) obtaining and/or reviewing separately obtained history. Performing a medically appropriate examination and evaluation.  Counseling and educating the patient.  Ordering medications, tests, or procedures.  Referring and communicating with other health care professionals.  Documenting clinical information in EPIC.  Independently interpreting results and communicating results to patient.  Care coordination.

## 2025-05-06 ENCOUNTER — PHARMACY VISIT (OUTPATIENT)
Dept: PHARMACY | Facility: MEDICAL CENTER | Age: 75
End: 2025-05-06
Payer: COMMERCIAL

## 2025-05-06 ENCOUNTER — APPOINTMENT (OUTPATIENT)
Dept: RADIOLOGY | Facility: MEDICAL CENTER | Age: 75
DRG: 689 | End: 2025-05-06
Attending: INTERNAL MEDICINE
Payer: MEDICARE

## 2025-05-06 PROBLEM — J12.2 PARAINFLUENZA VIRUS PNEUMONIA: Status: ACTIVE | Noted: 2025-05-06

## 2025-05-06 LAB
B PARAP IS1001 DNA NPH QL NAA+NON-PROBE: NOT DETECTED
B PERT.PT PRMT NPH QL NAA+NON-PROBE: NOT DETECTED
BACTERIA UR CULT: ABNORMAL
BACTERIA UR CULT: ABNORMAL
C PNEUM DNA NPH QL NAA+NON-PROBE: NOT DETECTED
FLUAV RNA NPH QL NAA+NON-PROBE: NOT DETECTED
FLUBV RNA NPH QL NAA+NON-PROBE: NOT DETECTED
HADV DNA NPH QL NAA+NON-PROBE: NOT DETECTED
HCOV 229E RNA NPH QL NAA+NON-PROBE: NOT DETECTED
HCOV HKU1 RNA NPH QL NAA+NON-PROBE: NOT DETECTED
HCOV NL63 RNA NPH QL NAA+NON-PROBE: NOT DETECTED
HCOV OC43 RNA NPH QL NAA+NON-PROBE: NOT DETECTED
HMPV RNA NPH QL NAA+NON-PROBE: NOT DETECTED
HPIV1 RNA NPH QL NAA+NON-PROBE: NOT DETECTED
HPIV2 RNA NPH QL NAA+NON-PROBE: NOT DETECTED
HPIV3 RNA NPH QL NAA+NON-PROBE: DETECTED
HPIV4 RNA NPH QL NAA+NON-PROBE: NOT DETECTED
M PNEUMO DNA NPH QL NAA+NON-PROBE: NOT DETECTED
POTASSIUM SERPL-SCNC: 3.6 MMOL/L (ref 3.6–5.5)
RSV RNA NPH QL NAA+NON-PROBE: NOT DETECTED
RV+EV RNA NPH QL NAA+NON-PROBE: NOT DETECTED
SARS-COV-2 RNA NPH QL NAA+NON-PROBE: NOTDETECTED
SIGNIFICANT IND 70042: ABNORMAL
SITE SITE: ABNORMAL
SOURCE SOURCE: ABNORMAL

## 2025-05-06 PROCEDURE — 700101 HCHG RX REV CODE 250: Performed by: INTERNAL MEDICINE

## 2025-05-06 PROCEDURE — 71045 X-RAY EXAM CHEST 1 VIEW: CPT

## 2025-05-06 PROCEDURE — 84132 ASSAY OF SERUM POTASSIUM: CPT

## 2025-05-06 PROCEDURE — 770001 HCHG ROOM/CARE - MED/SURG/GYN PRIV*

## 2025-05-06 PROCEDURE — 700102 HCHG RX REV CODE 250 W/ 637 OVERRIDE(OP): Performed by: INTERNAL MEDICINE

## 2025-05-06 PROCEDURE — 0202U NFCT DS 22 TRGT SARS-COV-2: CPT

## 2025-05-06 PROCEDURE — 36415 COLL VENOUS BLD VENIPUNCTURE: CPT

## 2025-05-06 PROCEDURE — RXMED WILLOW AMBULATORY MEDICATION CHARGE: Performed by: INTERNAL MEDICINE

## 2025-05-06 PROCEDURE — 700111 HCHG RX REV CODE 636 W/ 250 OVERRIDE (IP): Performed by: INTERNAL MEDICINE

## 2025-05-06 PROCEDURE — 700102 HCHG RX REV CODE 250 W/ 637 OVERRIDE(OP): Performed by: HOSPITALIST

## 2025-05-06 PROCEDURE — 700105 HCHG RX REV CODE 258: Performed by: HOSPITALIST

## 2025-05-06 PROCEDURE — A9270 NON-COVERED ITEM OR SERVICE: HCPCS | Performed by: INTERNAL MEDICINE

## 2025-05-06 PROCEDURE — A9270 NON-COVERED ITEM OR SERVICE: HCPCS | Performed by: HOSPITALIST

## 2025-05-06 PROCEDURE — 700111 HCHG RX REV CODE 636 W/ 250 OVERRIDE (IP): Performed by: HOSPITALIST

## 2025-05-06 PROCEDURE — 94640 AIRWAY INHALATION TREATMENT: CPT

## 2025-05-06 PROCEDURE — 99233 SBSQ HOSP IP/OBS HIGH 50: CPT | Performed by: INTERNAL MEDICINE

## 2025-05-06 PROCEDURE — 94760 N-INVAS EAR/PLS OXIMETRY 1: CPT

## 2025-05-06 RX ORDER — SULFAMETHOXAZOLE AND TRIMETHOPRIM 800; 160 MG/1; MG/1
1 TABLET ORAL 2 TIMES DAILY
Qty: 8 TABLET | Refills: 0 | Status: SHIPPED | OUTPATIENT
Start: 2025-05-06 | End: 2025-05-06

## 2025-05-06 RX ORDER — IPRATROPIUM BROMIDE AND ALBUTEROL SULFATE 2.5; .5 MG/3ML; MG/3ML
3 SOLUTION RESPIRATORY (INHALATION)
Status: DISCONTINUED | OUTPATIENT
Start: 2025-05-06 | End: 2025-05-07 | Stop reason: HOSPADM

## 2025-05-06 RX ORDER — PREDNISONE 20 MG/1
40 TABLET ORAL DAILY
Status: DISCONTINUED | OUTPATIENT
Start: 2025-05-06 | End: 2025-05-07 | Stop reason: HOSPADM

## 2025-05-06 RX ORDER — CEFUROXIME AXETIL 500 MG/1
500 TABLET ORAL 2 TIMES DAILY
Qty: 8 TABLET | Refills: 0 | Status: ACTIVE | OUTPATIENT
Start: 2025-05-06 | End: 2025-05-10

## 2025-05-06 RX ADMIN — TELMISARTAN 40 MG: 40 TABLET ORAL at 05:22

## 2025-05-06 RX ADMIN — CEFAZOLIN 2 G: 2 INJECTION, POWDER, FOR SOLUTION INTRAMUSCULAR; INTRAVENOUS at 05:27

## 2025-05-06 RX ADMIN — RIVAROXABAN 10 MG: 10 TABLET, FILM COATED ORAL at 17:19

## 2025-05-06 RX ADMIN — IPRATROPIUM BROMIDE AND ALBUTEROL SULFATE 3 ML: .5; 3 SOLUTION RESPIRATORY (INHALATION) at 15:43

## 2025-05-06 RX ADMIN — PREDNISONE 40 MG: 20 TABLET ORAL at 14:27

## 2025-05-06 RX ADMIN — IPRATROPIUM BROMIDE AND ALBUTEROL SULFATE 3 ML: .5; 3 SOLUTION RESPIRATORY (INHALATION) at 19:05

## 2025-05-06 RX ADMIN — ROSUVASTATIN CALCIUM 20 MG: 10 TABLET, FILM COATED ORAL at 17:19

## 2025-05-06 RX ADMIN — CEFAZOLIN 2 G: 2 INJECTION, POWDER, FOR SOLUTION INTRAMUSCULAR; INTRAVENOUS at 14:27

## 2025-05-06 RX ADMIN — CEFAZOLIN 2 G: 2 INJECTION, POWDER, FOR SOLUTION INTRAMUSCULAR; INTRAVENOUS at 21:06

## 2025-05-06 ASSESSMENT — ENCOUNTER SYMPTOMS
WHEEZING: 1
PALPITATIONS: 0
ABDOMINAL PAIN: 0
SHORTNESS OF BREATH: 1
FEVER: 0
CHILLS: 0
DIARRHEA: 0
BACK PAIN: 0
VOMITING: 0
HEADACHES: 0
COUGH: 1
NAUSEA: 0
CONSTIPATION: 0
DIZZINESS: 0

## 2025-05-06 ASSESSMENT — PAIN DESCRIPTION - PAIN TYPE
TYPE: ACUTE PAIN
TYPE: ACUTE PAIN

## 2025-05-06 NOTE — PROGRESS NOTES
BSSR received from Alex ORNELAS. Patient laying in bed in no apparent distress with c/o congestion and sore throat. A&O X4. Plan of care discussed with patient. Bed in low position with bed brakes applied and call light in reach. Patient given cup of hot tea with honey in order to sooth her throat.

## 2025-05-06 NOTE — PROGRESS NOTES
Received report from night shift RN, assumed care of pt. AAOx4, VSS on 1L oxygen. Pt denies pain or nausea this morning. Reporting new sore throat and congestion. Sitting up in bed for breakfast. Updated pt on plan of care. Safety precautions in place, pt educated to call for assistance.

## 2025-05-06 NOTE — ASSESSMENT & PLAN NOTE
Pt reports asthma  In 2008, she had a PFT showing mild obstructive lung disease  She requires a new PFT  I started Chris, checking viral panel PCR

## 2025-05-06 NOTE — CARE PLAN
The patient is Stable - Low risk of patient condition declining or worsening    Shift Goals  Clinical Goals: pain control,remain free from falls,culture pending  Patient Goals: rest,go home to [place]  Family Goals: n/a    Progress made toward(s) clinical / shift goals:  Patient has required no pain medication overnight    Patient is not progressing towards the following goals:      Problem: Respiratory  Goal: Patient will achieve/maintain optimum respiratory ventilation and gas exchange  Outcome: Not Progressing

## 2025-05-06 NOTE — PROGRESS NOTES
Hospital Medicine Daily Progress Note    Date of Service  5/6/2025    Chief Complaint  Lauren Esquivel is a 74 y.o. female admitted 5/3/2025 with   Chief Complaint   Patient presents with    Abdominal Pain     Pt brought back to triage in wheelchair c/o severe abd pain in her LLQ that started 3 days ago. Went to  today and due to high HR and fever or 102 they sent her here. Subjective fevers yesterday. + weakness and general malaise.     Diarrhea     Severe diarrhea today with incontinence.   Urinary frequency.      Hospital Course  74-year-old female with a past medical history of hypertension, hyperlipidemia, prediabetes, SENTHIL with CPAP, asthma, admitted on 5/3/2025 for subjective fevers, diarrhea, left lower quadrant abdominal pain.  In the ER patient was found to have fever of 100.8 F, tachycardia , hypoxic to 87% on room air requiring 2 L nasal cannula.  UA was positive for urinary tract infection, dehydration.  Labs also showed hypokalemia but no leukocytosis.  Reviewed CT scan, there is a large right renal cyst, left renal pelvis enhancement with distended ureter without stone possibly distal ureteral stricture. This is consistent with pyelonephritis. Patient was started on IV Ancef. Risk factors include weight, age, female.    Interval Problem Update  Continue IV Ancef for pansensitive E. coli causing pyelonephritis  I prescribed p.o. cefuroxime for discharge  Patient is hypoxic during my evaluation, down to 86% while speaking or nonspeaking.  We tested home oxygen and she requires 1 L.  This may be difficult to obtain.  I ordered home oxygen and placed referrals  I ordered outpatient PFT.  I reviewed her 2008 PFT showing mild obstructive lung disease.  Almost 20 years later I suspect this has progressed.    I have discussed this patient's plan of care and discharge plan at IDT rounds today with Case Management, Nursing, Nursing leadership, and other members of the IDT  team.    Consultants/Specialty  none    Code Status  Full Code    Disposition  The patient is not medically cleared for discharge to home or a post-acute facility.  Anticipate discharge to: home with close outpatient follow-up    I have placed the appropriate orders for post-discharge needs.    Review of Systems  Review of Systems   Constitutional:  Positive for malaise/fatigue. Negative for chills and fever.   Respiratory:  Positive for cough, shortness of breath and wheezing.    Cardiovascular:  Negative for chest pain and palpitations.   Gastrointestinal:  Negative for abdominal pain, constipation, diarrhea, nausea and vomiting.   Musculoskeletal:  Negative for back pain and joint pain.   Neurological:  Negative for dizziness and headaches.   All other systems reviewed and are negative.       Physical Exam  Temp:  [36.5 °C (97.7 °F)-37.2 °C (99 °F)] 36.5 °C (97.7 °F)  Pulse:  [77-90] 88  Resp:  [18] 18  BP: (145-159)/(76-91) 154/90  SpO2:  [92 %-94 %] 94 %    Physical Exam  Vitals and nursing note reviewed.   Constitutional:       General: She is in acute distress.      Appearance: Normal appearance. She is ill-appearing.   HENT:      Head: Normocephalic and atraumatic.   Eyes:      General: No scleral icterus.     Extraocular Movements: Extraocular movements intact.   Cardiovascular:      Rate and Rhythm: Normal rate.      Pulses: Normal pulses.      Heart sounds: Normal heart sounds. No murmur heard.  Pulmonary:      Effort: Pulmonary effort is normal. No respiratory distress.      Breath sounds: Wheezing, rhonchi and rales present.      Comments: On 2LNC  Abdominal:      General: Abdomen is flat. Bowel sounds are normal. There is no distension.      Palpations: Abdomen is soft.   Musculoskeletal:         General: No swelling or tenderness. Normal range of motion.      Cervical back: Normal range of motion and neck supple.      Right lower leg: No edema.      Left lower leg: No edema.   Skin:     General: Skin  is warm.      Capillary Refill: Capillary refill takes less than 2 seconds.      Coloration: Skin is not jaundiced.      Findings: No erythema.   Neurological:      General: No focal deficit present.      Mental Status: She is alert and oriented to person, place, and time. Mental status is at baseline.      Motor: Weakness present.   Psychiatric:         Mood and Affect: Mood normal.         Behavior: Behavior normal.         Thought Content: Thought content normal.         Judgment: Judgment normal.         Fluids    Intake/Output Summary (Last 24 hours) at 5/6/2025 1642  Last data filed at 5/6/2025 1235  Gross per 24 hour   Intake 820 ml   Output --   Net 820 ml        Laboratory  Recent Labs     05/03/25  1820 05/04/25  0358   WBC 9.6 7.8   RBC 4.48 4.35   HEMOGLOBIN 12.8 12.2   HEMATOCRIT 38.5 38.2   MCV 85.9 87.8   MCH 28.6 28.0   MCHC 33.2 31.9*   RDW 42.7 44.5   PLATELETCT 368 314   MPV 9.8 9.3     Recent Labs     05/03/25  1820 05/04/25  0358 05/05/25  0548 05/06/25  0523   SODIUM 137 140 138  --    POTASSIUM 3.4* 3.2* 3.0* 3.6   CHLORIDE 102 105 103  --    CO2 19* 21 21  --    GLUCOSE 119* 108* 121*  --    BUN 15 13 9  --    CREATININE 0.83 0.69 0.67  --    CALCIUM 9.1 8.6 8.7  --      Recent Labs     05/03/25  1820   INR 1.03               Imaging  DX-CHEST-PORTABLE (1 VIEW)   Final Result      Left basilar opacity likely represents a combination of small effusion and atelectasis versus infiltrate.      CT-ABDOMEN-PELVIS WITH   Final Result      1.  Urothelial hyperenhancement in the left ureter and left renal pelvis. Please correlate for ascending UTI.   2.  No other acute inflammatory process in the abdomen or pelvis.      DX-CHEST-PORTABLE (1 VIEW)   Final Result      No acute cardiopulmonary abnormality.              Assessment/Plan  * Pyelonephritis- (present on admission)  Assessment & Plan  UA significantly positive, CT scan consistent with left pyelonephritis.      Urine culture is pansensitive E.  coli.  I ordered p.o. cefuroxime for outpatient to complete treatment.  Continue IV Ancef here    Hypoxia- (present on admission)  Assessment & Plan  Pt on RA, was 86% while discussing at bedside.  Patient again would desaturate to 86% while at rest in bed, this would occur while speaking or not.  Home oxygen evaluation  I suspect she has a form of OHS. She needs a new PFT, 2008 showed mild obstructive lung disease findings.    Hypokalemia- (present on admission)  Assessment & Plan  Potassium 3.6, was 3.0 yesterday    Prediabetes- (present on admission)  Assessment & Plan  A1c's have not cross 6.4  Hold home metformin    Mild persistent asthma without complication, Dr Borja- (present on admission)  Assessment & Plan  Pt reports asthma  In 2008, she had a PFT showing mild obstructive lung disease  She requires a new PFT  I started Duonebs, checking viral panel PCR    Hyperlipidemia- (present on admission)  Assessment & Plan  Continue rosuvastatin    HTN (hypertension)- (present on admission)  Assessment & Plan  Continue telmisartan    SENTHIL on CPAP- (present on admission)  Assessment & Plan  On my evaluation, patient went down to 86% while we are discussing case.  She was not on oxygen.  I placed her on 2 L.  Patient states she also has asthma  Continue nocturnal CPAP         VTE prophylaxis:    Xarelto 10mg daily as prophylaxis      I have performed a physical exam and reviewed and updated ROS and Plan today (5/6/2025). In review of yesterday's note (5/5/2025), there are no changes except as documented above.      Total time spent 51 minutes. I spent greater than 50% of the time for patient care, including unit/floor time, multiple face-to-face encounters with the patient, counseling on treatment plan and discussion with bedside RN.  Further, I spent time on my own review of patient's overnight events, RN notes, imaging and lab analysis, and developing my assessment and plan above.  In addition, working with case  managers, social workers, and Charge RN on case coordination on this date.    This note was generated using voice recognition software which has a chance of producing errors of grammar and content.  I have made every reasonable attempt to find and correct any errors, but it should be expected that some may not be found prior to finalization of this note.

## 2025-05-06 NOTE — FACE TO FACE
"Face to Face Note  -  Durable Medical Equipment    Eamon Jacob M.D. - NPI: [id number]  I certify that this patient is under my care and that they had a durable medical equipment(DME)face to face encounter by myself that meets the physician DME face-to-face encounter requirements with this patient on:    Date of encounter:   Patient:                    MRN:                       YOB: 2026  Lauren Esquivel  [id number]  1950     The encounter with the patient was in whole, or in part, for the following medical condition, which is the primary reason for durable medical equipment:  Other - acute hypoxemic respiratory failure, obesity class 2 BMI 35.6, nocturnal CPAP, persistent asthma, mild obstructive lung disease.     I certify that, based on my findings, the following durable medical equipment is medically necessary:    Oxygen   HOME O2 Saturation Measurements:(Values must be present for Home Oxygen orders)  Room air sat at rest: 90  Room air sat with amb: 87  With liters of O2: 0, O2 sat at rest with O2: 92  With Liters of O2: 1, O2 sat with amb with O2 : 92  Is the patient mobile?: Yes  If patient feels more short of breath, they can go up to 6 liters per minute and contact healthcare provider.    Supporting Symptoms: The patient requires supplemental oxygen, as the following interventions have been tried with limited or no improvement: \"Bronchodilators and/or steroid inhalers, \"Oral and/or IV steroids, \"Ambulation with oximetry, and \"Incentive spirometry.    My Clinical findings support the need for the above equipment due to:  Hypoxia, acute hypoxemic respiratory failure.  "

## 2025-05-06 NOTE — CARE PLAN
The patient is Stable - Low risk of patient condition declining or worsening    Shift Goals  Clinical Goals: comfort, pending cultures  Patient Goals: rest, go home, start feeling better  Family Goals: n/a    Progress made toward(s) clinical / shift goals:  plan for PO abx to dc with, pt reports not feeling the best today. Pending respiratory panel. Monitoring oxygen. Progressing on other goals.     Patient is not progressing towards the following goals:

## 2025-05-07 VITALS
HEART RATE: 88 BPM | DIASTOLIC BLOOD PRESSURE: 83 MMHG | SYSTOLIC BLOOD PRESSURE: 141 MMHG | OXYGEN SATURATION: 100 % | HEIGHT: 65 IN | TEMPERATURE: 97 F | BODY MASS INDEX: 35.63 KG/M2 | RESPIRATION RATE: 18 BRPM | WEIGHT: 213.85 LBS

## 2025-05-07 LAB
ALBUMIN SERPL BCP-MCNC: 3.9 G/DL (ref 3.2–4.9)
ANION GAP SERPL CALC-SCNC: 15 MMOL/L (ref 7–16)
BUN SERPL-MCNC: 11 MG/DL (ref 8–22)
CALCIUM ALBUM COR SERPL-MCNC: 9.6 MG/DL (ref 8.5–10.5)
CALCIUM SERPL-MCNC: 9.5 MG/DL (ref 8.5–10.5)
CHLORIDE SERPL-SCNC: 103 MMOL/L (ref 96–112)
CO2 SERPL-SCNC: 22 MMOL/L (ref 20–33)
CREAT SERPL-MCNC: 0.67 MG/DL (ref 0.5–1.4)
ERYTHROCYTE [DISTWIDTH] IN BLOOD BY AUTOMATED COUNT: 42.8 FL (ref 35.9–50)
GFR SERPLBLD CREATININE-BSD FMLA CKD-EPI: 91 ML/MIN/1.73 M 2
GLUCOSE SERPL-MCNC: 177 MG/DL (ref 65–99)
HCT VFR BLD AUTO: 39 % (ref 37–47)
HGB BLD-MCNC: 12.5 G/DL (ref 12–16)
MAGNESIUM SERPL-MCNC: 1.8 MG/DL (ref 1.5–2.5)
MCH RBC QN AUTO: 28 PG (ref 27–33)
MCHC RBC AUTO-ENTMCNC: 32.1 G/DL (ref 32.2–35.5)
MCV RBC AUTO: 87.4 FL (ref 81.4–97.8)
NT-PROBNP SERPL IA-MCNC: 109 PG/ML (ref 0–125)
PHOSPHATE SERPL-MCNC: 3.1 MG/DL (ref 2.5–4.5)
PLATELET # BLD AUTO: 386 K/UL (ref 164–446)
PMV BLD AUTO: 9.5 FL (ref 9–12.9)
POTASSIUM SERPL-SCNC: 3.4 MMOL/L (ref 3.6–5.5)
PROCALCITONIN SERPL-MCNC: 0.1 NG/ML
RBC # BLD AUTO: 4.46 M/UL (ref 4.2–5.4)
SODIUM SERPL-SCNC: 140 MMOL/L (ref 135–145)
WBC # BLD AUTO: 6.9 K/UL (ref 4.8–10.8)

## 2025-05-07 PROCEDURE — 700111 HCHG RX REV CODE 636 W/ 250 OVERRIDE (IP): Performed by: INTERNAL MEDICINE

## 2025-05-07 PROCEDURE — 94640 AIRWAY INHALATION TREATMENT: CPT

## 2025-05-07 PROCEDURE — 80069 RENAL FUNCTION PANEL: CPT

## 2025-05-07 PROCEDURE — A9270 NON-COVERED ITEM OR SERVICE: HCPCS | Performed by: HOSPITALIST

## 2025-05-07 PROCEDURE — 83735 ASSAY OF MAGNESIUM: CPT

## 2025-05-07 PROCEDURE — 700101 HCHG RX REV CODE 250: Performed by: INTERNAL MEDICINE

## 2025-05-07 PROCEDURE — 94760 N-INVAS EAR/PLS OXIMETRY 1: CPT

## 2025-05-07 PROCEDURE — 83880 ASSAY OF NATRIURETIC PEPTIDE: CPT

## 2025-05-07 PROCEDURE — 36415 COLL VENOUS BLD VENIPUNCTURE: CPT

## 2025-05-07 PROCEDURE — 700102 HCHG RX REV CODE 250 W/ 637 OVERRIDE(OP): Performed by: INTERNAL MEDICINE

## 2025-05-07 PROCEDURE — 700102 HCHG RX REV CODE 250 W/ 637 OVERRIDE(OP): Performed by: HOSPITALIST

## 2025-05-07 PROCEDURE — 700105 HCHG RX REV CODE 258: Performed by: HOSPITALIST

## 2025-05-07 PROCEDURE — 84145 PROCALCITONIN (PCT): CPT

## 2025-05-07 PROCEDURE — 99239 HOSP IP/OBS DSCHRG MGMT >30: CPT | Performed by: INTERNAL MEDICINE

## 2025-05-07 PROCEDURE — 85027 COMPLETE CBC AUTOMATED: CPT

## 2025-05-07 PROCEDURE — 700111 HCHG RX REV CODE 636 W/ 250 OVERRIDE (IP): Performed by: HOSPITALIST

## 2025-05-07 PROCEDURE — A9270 NON-COVERED ITEM OR SERVICE: HCPCS | Performed by: INTERNAL MEDICINE

## 2025-05-07 RX ORDER — MAGNESIUM SULFATE HEPTAHYDRATE 40 MG/ML
2 INJECTION, SOLUTION INTRAVENOUS ONCE
Status: COMPLETED | OUTPATIENT
Start: 2025-05-07 | End: 2025-05-07

## 2025-05-07 RX ORDER — CEFUROXIME AXETIL 250 MG/1
500 TABLET ORAL EVERY 12 HOURS
Status: DISCONTINUED | OUTPATIENT
Start: 2025-05-07 | End: 2025-05-07 | Stop reason: HOSPADM

## 2025-05-07 RX ADMIN — POTASSIUM BICARBONATE 50 MEQ: 978 TABLET, EFFERVESCENT ORAL at 05:44

## 2025-05-07 RX ADMIN — PREDNISONE 40 MG: 20 TABLET ORAL at 04:51

## 2025-05-07 RX ADMIN — MAGNESIUM SULFATE HEPTAHYDRATE 2 G: 2 INJECTION, SOLUTION INTRAVENOUS at 05:43

## 2025-05-07 RX ADMIN — CEFUROXIME AXETIL 500 MG: 250 TABLET, FILM COATED ORAL at 14:56

## 2025-05-07 RX ADMIN — TELMISARTAN 40 MG: 40 TABLET ORAL at 04:50

## 2025-05-07 RX ADMIN — IPRATROPIUM BROMIDE AND ALBUTEROL SULFATE 3 ML: .5; 3 SOLUTION RESPIRATORY (INHALATION) at 02:35

## 2025-05-07 RX ADMIN — CEFAZOLIN 2 G: 2 INJECTION, POWDER, FOR SOLUTION INTRAMUSCULAR; INTRAVENOUS at 04:50

## 2025-05-07 RX ADMIN — IPRATROPIUM BROMIDE AND ALBUTEROL SULFATE 3 ML: .5; 3 SOLUTION RESPIRATORY (INHALATION) at 15:21

## 2025-05-07 RX ADMIN — IPRATROPIUM BROMIDE AND ALBUTEROL SULFATE 3 ML: .5; 3 SOLUTION RESPIRATORY (INHALATION) at 06:21

## 2025-05-07 ASSESSMENT — PAIN DESCRIPTION - PAIN TYPE: TYPE: ACUTE PAIN

## 2025-05-07 NOTE — PROGRESS NOTES
Micro called with lab result of positive Parainfluenza type 3. Results read back. Isolation ordered per protocol.

## 2025-05-07 NOTE — DISCHARGE SUMMARY
Discharge Summary    CHIEF COMPLAINT ON ADMISSION  Chief Complaint   Patient presents with    Abdominal Pain     Pt brought back to triage in wheelchair c/o severe abd pain in her LLQ that started 3 days ago. Went to UC today and due to high HR and fever or 102 they sent her here. Subjective fevers yesterday. + weakness and general malaise.     Diarrhea     Severe diarrhea today with incontinence.   Urinary frequency.        Reason for Admission  sent by ; high fever, abdominal *     Admission Date  5/3/2025    CODE STATUS  Full Code    HPI & HOSPITAL COURSE  This is Lauren Esquivel, a 74-year-old female with a past medical history of hypertension, hyperlipidemia, prediabetes, SENTHIL with CPAP, asthma, admitted on 5/3/2025 for subjective fevers, diarrhea, left lower quadrant abdominal pain.  In the ER patient was found to have fever of 100.8 F, tachycardia , hypoxic to 87% on room air requiring 2 L nasal cannula.  UA was positive for urinary tract infection, dehydration.  Labs also showed hypokalemia but no leukocytosis.  Reviewed CT scan, there is a large right renal cyst, left renal pelvis enhancement with distended ureter without stone possibly distal ureteral stricture. This is consistent with pyelonephritis. Patient was started on IV Ancef. Risk factors include weight, age, female.    Pt states she has been feeling more fatigued at home for the last week, feeling more exhausted trying to get around.  She developed left sided abdominal pains days ago.  She reports she does have urinary incontinence, has to use leak pad.   I explained to patient this could be a source of UTI if she is incontinent and any fecal matter that is in the anal area can moved to the urethra.  UA significantly positive, CT scan consistent with left pyelonephritis.  I changed antibiotics to IV Ancef.  Patient's urine culture showed pansensitive E. coli.  We continued IV Ancef and transition to oral cefuroxime.  She had full improvement  of her pyelonephritis prior to discharging.    On my evaluation, patient went down to 86% while we are discussing case.  She was not on oxygen. We placed patient on 2LNC, and she intermittently required supplemental oxygen. The day prior to discharging, she was complaining of shortness of breath and congestion.  Viral panel PCR was positive for acute parainfluenza viral pneumonia.  We treated patient with steroids and breathing treatments with significant and quick improvement.  I ordered outpatient PFT. I reviewed her 2008 PFT showing mild obstructive lung disease. Almost 20 years later I suspect this has progressed.   Unfortunately she still required home oxygen.  We were able to obtain DME.         Therefore, she is discharged in good and stable condition to home with close outpatient follow-up.    The patient met 2-midnight criteria for an inpatient stay at the time of discharge.    Discharge Date  5/7/2025    FOLLOW UP ITEMS POST DISCHARGE  With PCP  With [facility]    DISCHARGE DIAGNOSES  Principal Problem:    Pyelonephritis (POA: Yes)  Active Problems:    SENTHIL on CPAP (POA: Yes)      Overview: CPAP 8 cm. Heated humidity. Dr. Borja (retired), new referral started.     HTN (hypertension) (POA: Yes)    Hyperlipidemia (POA: Yes)      Overview: 2013: Elevated cholesterol since 2009. Pt does not want to take additional       Rx and does not want to take statins in particular. Discussed risks of       untreated hyperlipidemia      2015: advised medication and dietitian.       2016: 10 year CV Risk of heart disease or stroke 12.3%. Aspirin and high       intensity statin advised.       The 10-year ASCVD risk score ([place] DC Jr., et al., 2013) is: 11.9%    Mild persistent asthma without complication, Dr Borja (POA: Yes)    Prediabetes (POA: Yes)    Hypokalemia (POA: Yes)    Hypoxia (POA: Yes)    Parainfluenza virus pneumonia (POA: No)  Resolved Problems:    * No resolved hospital problems. *      FOLLOW  UP  Future Appointments   Date Time Provider Department Center   5/12/2025  9:10 AM Kathy Harris M.D. [facility] None   5/29/2025  3:00 PM Yessy Paz M.D. [facility]   6/11/2025  8:30 AM Nataly Garcia, PT, DPT [facility]   6/20/2025  9:30 AM Nataly Garcia, PT, DPT [facility]   6/25/2025  9:15 AM Nataly WERNER Edgeley, PT, DPT [facility]   6/27/2025 11:00 AM Nataly Davisder, PT, DPT [facility]   7/2/2025  9:15 AM Nataly WERNER Edgeley, PT, DPT [facility]   7/9/2025 11:30 AM Nataly Garcia, PT, DPT [facility]   7/11/2025 11:00 AM Nataly WERNER Jose, PT, DPT [facility]   7/16/2025 11:30 AM Nataly Garcia, PT, DPT [facility]   7/18/2025 12:45 PM Nataly Garcia, PT, DPT [facility]   7/23/2025 11:30 AM Nataly Garcia, PT, DPT [facility]     Yessy Paz M.D.  [address] [place] [address] [place] [id number] [phone]    Follow up in 2 week(s)  Follow Up.  Follow-up for posthospital visit.  Please repeat labs including CBC and CMP.  - Please repeat UA if needed if symptoms persist on the left flank pain.  Patient was treated for left pyelonephritis.  -She was found to be positive for parainfluenza virus right before discharge.  Please follow-up on her respiratory status.      MEDICATIONS ON DISCHARGE     Medication List        START taking these medications        Instructions   cefUROXime 500 MG Tabs  Commonly known as: Ceftin   Take 1 Tablet by mouth 2 times a day for 4 days.  Dose: 500 mg            CONTINUE taking these medications        Instructions   B-12 PO   Take 1 Tablet by mouth every day.  Dose: 1 Tablet     budesonide-formoterol 160-4.5 MCG/ACT Aero  Commonly known as: Symbicort   Inhale 2 Puffs 2 times a day as needed (For SOB).  Dose: 2 Puff     D3 PO   Take 1 Capsule by mouth every day.  Dose: 1 Capsule     meclizine 12.5 MG Tabs  Commonly known as: Antivert   Take 1 Tablet by mouth at bedtime as needed for Dizziness.  Dose: 12.5 mg      metFORMIN  MG Tb24  Commonly known as: Glucophage XR   Take 1 Tablet by mouth 2 times a day.  Dose: 500 mg     rosuvastatin 20 MG Tabs  Commonly known as: Crestor   Take 1 Tablet by mouth every evening.  Dose: 20 mg     telmisartan 40 MG Tabs  Commonly known as: Micardis   Take 1 Tablet by mouth every day.  Dose: 40 mg            STOP taking these medications      albuterol 108 (90 Base) MCG/ACT Aers inhalation aerosol     albuterol 2.5mg/3ml Nebu solution for nebulization  Commonly known as: Proventil     fluticasone-salmeterol 250-50 MCG/ACT Aepb  Commonly known as: Wixela Inhub     triamcinolone acetonide 0.1 % Oint  Commonly known as: Kenalog              Allergies  Allergies   Allergen Reactions    Ace Inhibitors      Cough    Ciprofloxacin     Levaquin [Levofloxacin]        DIET  Orders Placed This Encounter   Procedures    Diet Order Diet: Regular     Standing Status:   Standing     Number of Occurrences:   1     Diet::   Regular [1]       ACTIVITY  As tolerated. With oxygen.  Weight bearing as tolerated    CONSULTATIONS  none    PROCEDURES  none    LABORATORY  Lab Results   Component Value Date    SODIUM 140 05/07/2025    POTASSIUM 3.4 (L) 05/07/2025    CHLORIDE 103 05/07/2025    CO2 22 05/07/2025    GLUCOSE 177 (H) 05/07/2025    BUN 11 05/07/2025    CREATININE 0.67 05/07/2025    CREATININE 0.67 05/24/2012    GLOMRATE >59 08/17/2010        Lab Results   Component Value Date    WBC 6.9 05/07/2025    WBC 7.2 05/24/2012    HEMOGLOBIN 12.5 05/07/2025    HEMATOCRIT 39.0 05/07/2025    PLATELETCT 386 05/07/2025      DX-CHEST-PORTABLE (1 VIEW)   Final Result      Left basilar opacity likely represents a combination of small effusion and atelectasis versus infiltrate.      CT-ABDOMEN-PELVIS WITH   Final Result      1.  Urothelial hyperenhancement in the left ureter and left renal pelvis. Please correlate for ascending UTI.   2.  No other acute inflammatory process in the abdomen or pelvis.       DX-CHEST-PORTABLE (1 VIEW)   Final Result      No acute cardiopulmonary abnormality.             Susceptibility data from last 90 days.  Collected Specimen Info Organism Amoxicillin/CA Ampicillin Ampicillin/sulbactam Cefazolin Cefepime Ceftriaxone Cefuroxime Ciprofloxacin Gentamicin Levofloxacin Meropenem Meropenem/Vaborbactam Minocycline Nitrofurantoin   05/03/25 Urine Escherichia coli  S  S  S  S  S  S  S  S  S  S  S  S  S  S     Collected Specimen Info Organism Pip/Tazobactam Tigecycline Tobramycin Trimeth/Sulfa   05/03/25 Urine Escherichia coli  S  S  S  S      05/06/25 12:48   Adenovirus, PCR Not Detected   Bordetella parapertussis (BC1382), PCR Not Detected   Bordetella pertussis (ptxP), PCR Not Detected   Chlamydia pneumoniae, PCR Not Detected   Coronavirus 229E, PCR Not Detected   Coronavirus HKU1, PCR Not Detected   Coronavirus NL63, PCR Not Detected   Coronavirus OC43, PCR Not Detected   Human Metapneumovirus, PCR Not Detected   Influenza A, PCR Not Detected   Influenza B, PCR Not Detected   Parainfluenza 1, PCR Not Detected   Parainfluenza 2, PCR Not Detected   Parainfluenza 3, PCR DETECTED !   Parainfluenza 4, PCR Not Detected   Rhino/Enterovirus, PCR Not Detected   RSV (Respiratory Syncytial Virus), PCR Not Detected   Mycoplasma pneumoniae, PCR Not Detected   !: Data is abnormal        Total time of the discharge process exceeds 42 minutes.

## 2025-05-07 NOTE — DISCHARGE PLANNING
Received Choice Form at: 0919   Agency/Facility Name: [facility]  Sent Referral per Choice Form at: 1217 sent vis [facility] portal

## 2025-05-07 NOTE — PROGRESS NOTES
Received report from day shift nurse. Assumed pt care at 1915. Pt is A&Ox4, resting comfortably in bed. Pt on 2 lpm via nc No signs of SOB/respiratory distress. Labs noted, VSS. Pt c/o no pain at this moment. Fall precautions in place. Bed at lowest position. Call light and personal belongings within reach. Plan of care on going, no further concerns as of present.

## 2025-05-07 NOTE — PROGRESS NOTES
Report received from Imnieves RN, assumed care of pt at 0700.   POC and medications reviewed with pt. Pt verbalized understanding.   AOx4  C/o nothing at this time.  Denies pain, SOB, or dizziness at this time.   Safety measures in place.  Hourly rounding in place.

## 2025-05-07 NOTE — CARE PLAN
The patient is Stable - Low risk of patient condition declining or worsening    Shift Goals  Clinical Goals: monitor 02 sat  Patient Goals: sleep at least 8 hours  Family Goals: n/a    Progress made toward(s) clinical / shift goals:  O2 sat kept monitored. Pt tolerated IV antibiotics, no adverse reaction observed. Call light within reach. Fall precautions in placed. Intentional rounding and plan of care in progress.    Patient is not progressing towards the following goals:N/A

## 2025-05-08 ENCOUNTER — PATIENT OUTREACH (OUTPATIENT)
Dept: MEDICAL GROUP | Facility: MEDICAL CENTER | Age: 75
End: 2025-05-08
Payer: MEDICARE

## 2025-05-08 LAB
BACTERIA BLD CULT: NORMAL
BACTERIA BLD CULT: NORMAL
SIGNIFICANT IND 70042: NORMAL
SIGNIFICANT IND 70042: NORMAL
SITE SITE: NORMAL
SITE SITE: NORMAL
SOURCE SOURCE: NORMAL
SOURCE SOURCE: NORMAL

## 2025-05-08 NOTE — PROGRESS NOTES
Pt is already discharged. All discharge papers and instruction given by gisell ríos. Waiting to be transported to parking lot where her  was waiting.

## 2025-05-09 NOTE — PROGRESS NOTES
Transitional Care Management    Two attempts were made to contact this patient within two business days to review discharge per CMS guidelines, but were unsuccessful.

## 2025-05-12 ENCOUNTER — OFFICE VISIT (OUTPATIENT)
Dept: SLEEP MEDICINE | Facility: MEDICAL CENTER | Age: 75
End: 2025-05-12
Attending: INTERNAL MEDICINE
Payer: MEDICARE

## 2025-05-12 VITALS
HEART RATE: 70 BPM | SYSTOLIC BLOOD PRESSURE: 120 MMHG | BODY MASS INDEX: 35.49 KG/M2 | OXYGEN SATURATION: 94 % | DIASTOLIC BLOOD PRESSURE: 70 MMHG | HEIGHT: 65 IN | WEIGHT: 213 LBS

## 2025-05-12 DIAGNOSIS — J45.40 MODERATE PERSISTENT ASTHMA WITHOUT COMPLICATION: ICD-10-CM

## 2025-05-12 DIAGNOSIS — J96.01 ACUTE RESPIRATORY FAILURE WITH HYPOXIA (HCC): ICD-10-CM

## 2025-05-12 DIAGNOSIS — B34.8 PARAINFLUENZA: ICD-10-CM

## 2025-05-12 PROCEDURE — 99214 OFFICE O/P EST MOD 30 MIN: CPT | Mod: 25 | Performed by: INTERNAL MEDICINE

## 2025-05-12 PROCEDURE — 94761 N-INVAS EAR/PLS OXIMETRY MLT: CPT | Performed by: INTERNAL MEDICINE

## 2025-05-12 PROCEDURE — 99204 OFFICE O/P NEW MOD 45 MIN: CPT | Performed by: INTERNAL MEDICINE

## 2025-05-12 RX ORDER — ALBUTEROL SULFATE 90 UG/1
2 INHALANT RESPIRATORY (INHALATION) EVERY 6 HOURS PRN
Qty: 8.5 G | Refills: 11 | Status: SHIPPED | OUTPATIENT
Start: 2025-05-12

## 2025-05-12 RX ORDER — BUDESONIDE AND FORMOTEROL FUMARATE DIHYDRATE 160; 4.5 UG/1; UG/1
2 AEROSOL RESPIRATORY (INHALATION) 2 TIMES DAILY
Qty: 3 EACH | Refills: 3 | Status: SHIPPED | OUTPATIENT
Start: 2025-05-12 | End: 2025-05-29

## 2025-05-12 RX ORDER — BUDESONIDE AND FORMOTEROL FUMARATE DIHYDRATE 160; 4.5 UG/1; UG/1
2 AEROSOL RESPIRATORY (INHALATION) 2 TIMES DAILY
COMMUNITY
End: 2025-05-12 | Stop reason: SDUPTHER

## 2025-05-12 RX ORDER — ALBUTEROL SULFATE 90 UG/1
2 INHALANT RESPIRATORY (INHALATION) EVERY 6 HOURS PRN
COMMUNITY
End: 2025-05-12 | Stop reason: SDUPTHER

## 2025-05-12 ASSESSMENT — ENCOUNTER SYMPTOMS
SORE THROAT: 0
EYE REDNESS: 0
FALLS: 0
SHORTNESS OF BREATH: 0
NECK PAIN: 0
PHOTOPHOBIA: 0
EYE DISCHARGE: 0
EYE PAIN: 0
DIARRHEA: 0
DEPRESSION: 0
DIAPHORESIS: 0
NAUSEA: 0
DIZZINESS: 0
BACK PAIN: 0
WEIGHT LOSS: 0
SPEECH CHANGE: 0
FOCAL WEAKNESS: 0
HEADACHES: 0
DOUBLE VISION: 0
HEARTBURN: 0
PALPITATIONS: 0
SINUS PAIN: 0
MYALGIAS: 0
ABDOMINAL PAIN: 0
WHEEZING: 0
ORTHOPNEA: 0
FEVER: 0
WEAKNESS: 0
STRIDOR: 0
COUGH: 0
VOMITING: 0
TREMORS: 0
CONSTIPATION: 0
HEMOPTYSIS: 0
CLAUDICATION: 0
PND: 0
SPUTUM PRODUCTION: 0
BLURRED VISION: 0
CHILLS: 0

## 2025-05-12 ASSESSMENT — FIBROSIS 4 INDEX: FIB4 SCORE: 0.82

## 2025-05-12 NOTE — PROCEDURES
Multi-Ox Readings  Multi Ox #1 Room air   O2 sat % at rest 93   O2 sat % on exertion 90   O2 sat average on exertion     Multi Ox #2     O2 sat % at rest     O2 sat % on exertion     O2 sat average on exertion

## 2025-05-12 NOTE — PROGRESS NOTES
Chief Complaint   Patient presents with    Follow-Up     REF BY ADEN LOPES FOR Atelectasis of left lung, ASTHMA    Results     PFT 8/6/08, CXR 5/6/25, 5/3/25, 3/7/25       HPI: This patient is a 74 y.o. female presenting for evaluation of acute respiratory failure and hx of asthma.  PMHx is significant for HTN, dyslipidemia, pre-DM, SENTHIL.  The patient is a lifelong non-smoker.  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  Prior to that she and her  worked in [email or web address].  No known occupational exposures.  She was referred to me after recent hospital stay when she presented to the ER on May 3 with fever, left lower quadrant abdominal pain and was diagnosed with pyelonephritis.  Prior to discharge she had worsening shortness of breath and was diagnosed with parainfluenza.  She was discharged with antibiotics and supplemental oxygen.  Prior to that she was treated for acute asthma exacerbation with Medrol Dosepak and March, February and August.  She is currently on Breyna 160 and pulmonary function testing from 2008 showed proportionate reduction in both FVC at 2.58 L or 79% predicted and FEV1 at 1.88 L or 71% predicted with a normal FEV1/FVC ratio 73.  Diffusion capacity was normal.  Regarding her asthma history, she tells me she was diagnosed at the age of 6 or 7 but did not have ongoing symptoms until she moved to the [place].  For a while she was seeing Dr. Borja but denies any allergies.  Symptoms have improved since her hospital stay which was mainly cough.  She denies active wheezing or shortness of breath currently.    Past Medical History:   Diagnosis Date    Anemia     Anxiety state, unspecified 1/3/2010    ASTHMA     Chronic rhinitis 1/3/2010    COPD     Elevated C-reactive protein (CRP) 2/6/2012    H/O pyelonephritis 8/21/2012    H/O: female infertility 8/10/2011    History of nephrolithiasis 8/21/2012    HTN     HTN (hypertension) 8/11/2010     Hyperglycemia 6/4/2012    Hyperlipidemia 8/28/2010    Influenza A with pneumonia 12/27/2016    [facility]'s admission 2016.     Kidney filling defect 8/21/2012    Laryngeal spasm 1/3/2010    Metabolic syndrome 6/4/2012    Mycoplasma pneumonia 2003    Obesity 6/4/2012    Preventative health care 8/11/2010    Pyelonephritis 7/30/2012    Respiratory malfunction arising from mental factors 1/3/2010    Sleep apnea     Vitamin d deficiency 8/28/2010    Wheeze 12/4/2009       Social History     Socioeconomic History    Marital status:      Spouse name: Not on file    Number of children: 2    Years of education: Not on file    Highest education level: Not on file   Occupational History     Employer: [employer]   Tobacco Use    Smoking status: Never    Smokeless tobacco: Never   Vaping Use    Vaping status: Never Used   Substance and Sexual Activity    Alcohol use: No     Alcohol/week: 0.0 oz    Drug use: No    Sexual activity: Yes     Partners: Male   Other Topics Concern    Not on file   Social History Narrative    Not on file     Social Drivers of Health     Financial Resource Strain: Not on file   Food Insecurity: No Food Insecurity (5/3/2025)    Hunger Vital Sign     Worried About Running Out of Food in the Last Year: Never true     Ran Out of Food in the Last Year: Never true   Transportation Needs: No Transportation Needs (5/3/2025)    PRAPARE - Transportation     Lack of Transportation (Medical): No     Lack of Transportation (Non-Medical): No   Physical Activity: Not on file   Stress: Not on file   Social Connections: Not on file   Intimate Partner Violence: Not At Risk (5/3/2025)    Humiliation, Afraid, Rape, and Kick questionnaire     Fear of Current or Ex-Partner: No     Emotionally Abused: No     Physically Abused: No     Sexually Abused: No   Housing Stability: Low Risk  (5/3/2025)    Housing Stability Vital Sign     Unable to Pay for Housing in the Last Year: No     Number of Times Moved in the  Last Year: 0     Homeless in the Last Year: No       Family History   Problem Relation Age of Onset    Lung Disease Mother     Hypertension Mother     Heart Disease Father     Hypertension Maternal Grandfather     Stroke Brother     Heart Disease Brother        Current Outpatient Medications on File Prior to Visit   Medication Sig Dispense Refill    Cholecalciferol (D3 PO) Take 1 Capsule by mouth every day.      Cyanocobalamin (B-12 PO) Take 1 Tablet by mouth every day.      rosuvastatin (CRESTOR) 20 MG Tab Take 1 Tablet by mouth every evening. 90 Tablet 3    metFORMIN ER (GLUCOPHAGE XR) 500 MG TABLET SR 24 HR Take 1 Tablet by mouth 2 times a day. 180 Tablet 3    budesonide-formoterol (SYMBICORT) 160-4.5 MCG/ACT Aerosol Inhale 2 Puffs 2 times a day as needed (For SOB). (Patient not taking: Reported on 5/12/2025)      meclizine (ANTIVERT) 12.5 MG Tab Take 1 Tablet by mouth at bedtime as needed for Dizziness. (Patient not taking: Reported on 5/12/2025) 30 Tablet 0    telmisartan (MICARDIS) 40 MG Tab Take 1 Tablet by mouth every day. (Patient not taking: Reported on 5/12/2025) 90 Tablet 3     No current facility-administered medications on file prior to visit.       Allergies: Ace inhibitors, Ciprofloxacin, and Levaquin [levofloxacin]    ROS:   Review of Systems   Constitutional:  Negative for chills, diaphoresis, fever, malaise/fatigue and weight loss.   HENT:  Negative for congestion, ear discharge, ear pain, hearing loss, nosebleeds, sinus pain, sore throat and tinnitus.    Eyes:  Negative for blurred vision, double vision, photophobia, pain, discharge and redness.   Respiratory:  Negative for cough, hemoptysis, sputum production, shortness of breath, wheezing and stridor.    Cardiovascular:  Negative for chest pain, palpitations, orthopnea, claudication, leg swelling and PND.   Gastrointestinal:  Negative for abdominal pain, constipation, diarrhea, heartburn, nausea and vomiting.   Genitourinary:  Negative for  "dysuria and urgency.   Musculoskeletal:  Negative for back pain, falls, joint pain, myalgias and neck pain.   Skin:  Negative for itching and rash.   Neurological:  Negative for dizziness, tremors, speech change, focal weakness, weakness and headaches.   Endo/Heme/Allergies:  Negative for environmental allergies.   Psychiatric/Behavioral:  Negative for depression.        /70 (BP Location: Right arm, Patient Position: Sitting, BP Cuff Size: Large adult)   Pulse 70   Ht 1.651 m (5' 5\")   Wt 96.6 kg (213 lb)   SpO2 94%     Physical Exam:  Physical Exam  Constitutional:       General: She is not in acute distress.     Appearance: Normal appearance. She is well-developed and normal weight.   HENT:      Head: Normocephalic and atraumatic.      Right Ear: External ear normal.      Left Ear: External ear normal.      Nose: Nose normal. No congestion.      Mouth/Throat:      Mouth: Mucous membranes are moist.      Pharynx: Oropharynx is clear. No oropharyngeal exudate.   Eyes:      General: No scleral icterus.     Extraocular Movements: Extraocular movements intact.      Conjunctiva/sclera: Conjunctivae normal.      Pupils: Pupils are equal, round, and reactive to light.   Neck:      Vascular: No JVD.      Trachea: No tracheal deviation.   Cardiovascular:      Rate and Rhythm: Normal rate and regular rhythm.      Heart sounds: Normal heart sounds. No murmur heard.     No friction rub. No gallop.   Pulmonary:      Effort: Pulmonary effort is normal. No accessory muscle usage or respiratory distress.      Breath sounds: Normal breath sounds. No wheezing or rales.   Abdominal:      General: There is no distension.      Palpations: Abdomen is soft.      Tenderness: There is no abdominal tenderness.   Musculoskeletal:         General: No tenderness or deformity. Normal range of motion.      Cervical back: Normal range of motion and neck supple.      Right lower leg: No edema.      Left lower leg: No edema. "   Lymphadenopathy:      Cervical: No cervical adenopathy.   Skin:     General: Skin is warm and dry.      Findings: No rash.      Nails: There is no clubbing.   Neurological:      Mental Status: She is alert and oriented to person, place, and time.      Cranial Nerves: No cranial nerve deficit.      Gait: Gait normal.   Psychiatric:         Behavior: Behavior normal.       PFTs as reviewed by me personally: as per hpI    Imaging as reviewed by me personally:CXR from 5/6/25 showed L basilar atelectasis    Assessment:  1. Moderate persistent asthma without complication  PULMONARY FUNCTION TESTS -Test requested: Complete Pulmonary Function Test    Multiple Oximetry    budesonide-formoterol (BREYNA) 160-4.5 MCG/ACT Aerosol    albuterol 108 (90 Base) MCG/ACT Aero Soln inhalation aerosol    CBC WITH DIFFERENTIAL    IGE TOTAL [facility] IMMUNOCAP      2. Acute respiratory failure with hypoxia (HCC)  PULMONARY FUNCTION TESTS -Test requested: Complete Pulmonary Function Test    Multiple Oximetry    budesonide-formoterol (BREYNA) 160-4.5 MCG/ACT Aerosol    albuterol 108 (90 Base) MCG/ACT Aero Soln inhalation aerosol      3. Parainfluenza            Plan:  Chronic diagnosis but new to me.  Sounds as though it has been not well-controlled and not clear if more recent symptoms were strictly related to parainfluenza virus.  No wheezing on exam today.  I did not change her medication regimen but will obtain CBC with differential for eosinophils and serum IgE level.  Will obtain full pulmonary function testing.  If she needs treatment again for exacerbation we will consider biologic therapy and/or referral to allergy.  Resolved.  No significant desaturation on ambulation today.  Okay to DC oxygen.  Resolved.  Return in about 4 months (around 9/12/2025) for PFT at pt convenience.

## 2025-05-12 NOTE — Clinical Note
REFERRAL APPROVAL NOTICE         Sent on May 12, 2025                   Lauren Esquivel  [address] [place] [address]                   Dear Ms. Esquivel,    After a careful review of the medical information and benefit coverage, [facility] has processed your referral. See below for additional details.    If applicable, you must be actively enrolled with your insurance for coverage of the authorized service. If you have any questions regarding your coverage, please contact your insurance directly.    REFERRAL INFORMATION   Referral #:  [id number]  Referred-To Department    Referred-By Provider:  Pulmonary and Sleep Medicine    Eamon Jacob M.D.   Pulmonary Rehab [facility]      [address]  [place] [address]  [phone] [id number]  [place] [address]  [phone]    Referral Start Date:  05/19/2025  Referral End Date:   05/19/2026             SCHEDULING  If you do not already have an appointment, please call [phone] to make an appointment.     MORE INFORMATION  If you do not already have a [employer] technology account, sign up at: [email or web address]  You can access your medical information, make appointments, see lab results, billing information, and more.  If you have questions regarding this referral, please contact  the [facility] Referrals department at:             [phone]. Monday - Friday 8:00AM - 5:00PM.     Sincerely,    [facility]

## 2025-05-14 ENCOUNTER — HOSPITAL ENCOUNTER (OUTPATIENT)
Dept: LAB | Facility: MEDICAL CENTER | Age: 75
End: 2025-05-14
Attending: INTERNAL MEDICINE
Payer: MEDICARE

## 2025-05-14 DIAGNOSIS — J45.40 MODERATE PERSISTENT ASTHMA WITHOUT COMPLICATION: ICD-10-CM

## 2025-05-14 LAB
BASOPHILS # BLD AUTO: 0.7 % (ref 0–1.8)
BASOPHILS # BLD: 0.07 K/UL (ref 0–0.12)
EOSINOPHIL # BLD AUTO: 0.46 K/UL (ref 0–0.51)
EOSINOPHIL NFR BLD: 4.8 % (ref 0–6.9)
ERYTHROCYTE [DISTWIDTH] IN BLOOD BY AUTOMATED COUNT: 44.9 FL (ref 35.9–50)
HCT VFR BLD AUTO: 41.9 % (ref 37–47)
HGB BLD-MCNC: 13.1 G/DL (ref 12–16)
IMM GRANULOCYTES # BLD AUTO: 0.11 K/UL (ref 0–0.11)
IMM GRANULOCYTES NFR BLD AUTO: 1.2 % (ref 0–0.9)
LYMPHOCYTES # BLD AUTO: 2.41 K/UL (ref 1–4.8)
LYMPHOCYTES NFR BLD: 25.3 % (ref 22–41)
MCH RBC QN AUTO: 27.8 PG (ref 27–33)
MCHC RBC AUTO-ENTMCNC: 31.3 G/DL (ref 32.2–35.5)
MCV RBC AUTO: 89 FL (ref 81.4–97.8)
MONOCYTES # BLD AUTO: 0.69 K/UL (ref 0–0.85)
MONOCYTES NFR BLD AUTO: 7.3 % (ref 0–13.4)
NEUTROPHILS # BLD AUTO: 5.77 K/UL (ref 1.82–7.42)
NEUTROPHILS NFR BLD: 60.7 % (ref 44–72)
NRBC # BLD AUTO: 0 K/UL
NRBC BLD-RTO: 0 /100 WBC (ref 0–0.2)
PLATELET # BLD AUTO: 509 K/UL (ref 164–446)
PMV BLD AUTO: 9.9 FL (ref 9–12.9)
RBC # BLD AUTO: 4.71 M/UL (ref 4.2–5.4)
WBC # BLD AUTO: 9.5 K/UL (ref 4.8–10.8)

## 2025-05-14 PROCEDURE — 36415 COLL VENOUS BLD VENIPUNCTURE: CPT

## 2025-05-14 PROCEDURE — 85025 COMPLETE CBC W/AUTO DIFF WBC: CPT

## 2025-05-14 PROCEDURE — 82785 ASSAY OF IGE: CPT

## 2025-05-15 NOTE — Clinical Note
REFERRAL APPROVAL NOTICE         Sent on May 15, 2025                   Lauren Karolyn Alvin  [address] [place] [address]                   Dear Ms. Esquivel,    After a careful review of the medical information and benefit coverage, [facility] has processed your referral. See below for additional details.    If applicable, you must be actively enrolled with your insurance for coverage of the authorized service. If you have any questions regarding your coverage, please contact your insurance directly.    REFERRAL INFORMATION   Referral #:  [id number]  Referred-To Department    Referred-By Provider:  Pulmonary and Sleep Medicine    Kathy Harris M.D.   Pulmonary/sleep [facility]      [address]  [place] [address]  [phone] [address] [place] [address] [address]  [place] [address]  [phone]    Referral Start Date:  05/12/2025  Referral End Date:   05/12/2026           SCHEDULING  If you do not already have an appointment, please call [phone] to make an appointment.   MORE INFORMATION  As a reminder, [facility] ownership has changed, meaning this location is now owned and operated by [facility]. As such, we want to clarify that our patients should expect to receive two separate bills for the services received at [facility] - one representing the [facility] facility fees as the owner of the establishment, and the other to represent the physician's services and subsequent fees. You can speak with your insurance carrier for a pricing estimate by calling the customer service number on the back of your card and ask about charges for a hospital outpatient visit.  If you do not already have a [employer] account, sign up at: [email or web address]  You can access your medical information, make appointments, see lab results, billing  information, and more.  If you have questions regarding this referral, please contact  the [facility] department at:             [phone]. Monday - Friday 7:30AM - 5:00PM.      Sincerely,  [facility]

## 2025-05-16 LAB — IGE SERPL-ACNC: 170 KU/L

## 2025-05-29 ENCOUNTER — OFFICE VISIT (OUTPATIENT)
Dept: MEDICAL GROUP | Facility: MEDICAL CENTER | Age: 75
End: 2025-05-29
Payer: MEDICARE

## 2025-05-29 VITALS
TEMPERATURE: 97.5 F | BODY MASS INDEX: 35.72 KG/M2 | DIASTOLIC BLOOD PRESSURE: 74 MMHG | WEIGHT: 214.4 LBS | HEIGHT: 65 IN | HEART RATE: 81 BPM | OXYGEN SATURATION: 95 % | SYSTOLIC BLOOD PRESSURE: 128 MMHG

## 2025-05-29 DIAGNOSIS — E87.6 HYPOKALEMIA: ICD-10-CM

## 2025-05-29 DIAGNOSIS — I10 PRIMARY HYPERTENSION: ICD-10-CM

## 2025-05-29 DIAGNOSIS — Z09 HOSPITAL DISCHARGE FOLLOW-UP: ICD-10-CM

## 2025-05-29 DIAGNOSIS — J45.40 MODERATE PERSISTENT ASTHMA WITHOUT COMPLICATION: ICD-10-CM

## 2025-05-29 DIAGNOSIS — R09.02 HYPOXIA: Primary | ICD-10-CM

## 2025-05-29 DIAGNOSIS — R07.89 MUSCULOSKELETAL CHEST PAIN: ICD-10-CM

## 2025-05-29 DIAGNOSIS — J12.2 PARAINFLUENZA VIRUS PNEUMONIA: ICD-10-CM

## 2025-05-29 DIAGNOSIS — E78.00 PURE HYPERCHOLESTEROLEMIA: ICD-10-CM

## 2025-05-29 DIAGNOSIS — N12 PYELONEPHRITIS: ICD-10-CM

## 2025-05-29 DIAGNOSIS — J96.11 CHRONIC RESPIRATORY FAILURE WITH HYPOXIA (HCC): ICD-10-CM

## 2025-05-29 DIAGNOSIS — R73.03 PREDIABETES: ICD-10-CM

## 2025-05-29 RX ORDER — TELMISARTAN 40 MG/1
40 TABLET ORAL
Qty: 90 TABLET | Refills: 3 | Status: SHIPPED | OUTPATIENT
Start: 2025-05-29

## 2025-05-29 RX ORDER — ROSUVASTATIN CALCIUM 20 MG/1
20 TABLET, COATED ORAL EVERY EVENING
Qty: 90 TABLET | Refills: 3 | Status: SHIPPED | OUTPATIENT
Start: 2025-05-29

## 2025-05-29 RX ORDER — METFORMIN HYDROCHLORIDE 500 MG/1
500 TABLET, EXTENDED RELEASE ORAL 2 TIMES DAILY
Qty: 180 TABLET | Refills: 3 | Status: SHIPPED | OUTPATIENT
Start: 2025-05-29

## 2025-05-29 RX ORDER — BUDESONIDE AND FORMOTEROL FUMARATE DIHYDRATE 160; 4.5 UG/1; UG/1
2 AEROSOL RESPIRATORY (INHALATION) 2 TIMES DAILY
Qty: 10.2 G | Refills: 5 | Status: SHIPPED | OUTPATIENT
Start: 2025-05-29

## 2025-05-29 ASSESSMENT — FIBROSIS 4 INDEX: FIB4 SCORE: 0.62

## 2025-05-29 NOTE — PROGRESS NOTES
Verbal consent was acquired by the patient to use [employer] ambient listening note generation during this visit: YES    CC: hospital discharge follow up     Assessment & Plan:     1. Hospital discharge follow-up  2. Hypoxia (Primary)  3. Hypokalemia  4. Parainfluenza virus pneumonia  5. Pyelonephritis  6. Moderate persistent asthma without complication  7. Chronic respiratory failure with hypoxia (HCC)  Patient was admitted to the hospital earlier this month for acute pyelonephritis and parainfluenza pneumonia with acute hypoxia. She was treated with antibiotic and discharged with supplemental oxygen. She had follow up appointment with Dr. Harris on 5/12/2025. She was prescribed Breyna and albuterol for asthma. However, she discontinued Breyna due to abnormal oral sensation after its use. She is doing much better. Except for lingering cough, she does not have any issue. She was advised to continue to use 1L of oxygen at night with CPAP. She has follow up appointment with Dr. Harris in 6 months. She was advise to have PFT done.   - budesonide-formoterol (SYMBICORT) 160-4.5 MCG/ACT Aerosol; Inhale 2 Puffs 2 times a day.  Dispense: 10.2 g; Refill: 5  - continue 1L of oxygen at night with CPAP  - continue albuterol PRN for SOB   - follow up with pulmonology as directed.   - stay up-to-date with recommended vaccines.       8. Prediabetes  Chronic, currently taking Metformin 500 mg BID. Will repeat labs for reassessment.   - HEMOGLOBIN A1C; Future  - metFORMIN ER (GLUCOPHAGE XR) 500 MG TABLET SR 24 HR; Take 1 Tablet by mouth 2 times a day.  Dispense: 180 Tablet; Refill: 3    9. Pure hypercholesterolemia  Chronic, controlled with Crestor 20 mg qd, no s/e reported, will continue.    - Lipid Profile; Future  - rosuvastatin (CRESTOR) 20 MG Tab; Take 1 Tablet by mouth every evening.  Dispense: 90 Tablet; Refill: 3    10. Primary hypertension  Chronic, controlled with Telmisartan 40 mg qd, no s/e reported, will continue.    -  Comp Metabolic Panel; Future  - CBC WITHOUT DIFFERENTIAL; Future  - MICROALBUMIN CREAT RATIO URINE; Future  - telmisartan (MICARDIS) 40 MG Tab; Take 1 Tablet by mouth every day.  Dispense: 90 Tablet; Refill: 3    11. Musculoskeletal chest pain.   History and exam are concerning for musculoskeletal chest pain from excessive coughing over the past few weeks. Reassurance provided. Recommended Tylenol PRN for pain.            Subjective:       HPI:     History of Present Illness  The patient presents for a follow-up visit.    She was recently hospitalized for acute pyelonephritis. Urine culture positive for pan-sensitive E.coli. she was treated with antibiotic and denies any active urogenital symptoms at this time. Patient reported that she experienced severe diarrhea prior to onset of pyelonephritis, but did not have any dysuria, hematuria, urinary frequency/urgency.     During her hospital stay, she was diagnosed with parainfluenza pneumonia leading to acute respiratory failure. She was discharged with oxygen supplement. She has been referred to a pulmonologist, Dr. Kathy Harris, for further evaluation. The pulmonologist informed her that her asthma is not severe and that it can fluctuate depending on the air quality and time of year. She was advised to use 1 liter of oxygen at night with her CPAP machine and does not need a portable unit during the day. She has an appointment with the pulmonologist in 11/2025. She reports feeling fine and her oxygen saturation was 95% today.     She has been prescribed Breyna twice daily but reports adverse effects including mouth swelling and sore throat. She has discontinued Breyna for the past 5 days and reports feeling slightly out of breath but otherwise well. She has previously used Symbicort and Advair but is currently not on these medications. She occasionally experiences shortness of breath, which she attributes to her level of physical activity.    She has been  "experiencing chest pain localizing to the soft tissue at the distal sternum on the left. Pain is described as both sharp and dull, lasting for a few seconds, which she first noticed last week after her hospital discharge. She reports no exacerbation of chest pain with coughing.           Current Medications[1]     Objective:     Exam:  /74 (BP Location: Left arm, Patient Position: Sitting, BP Cuff Size: Large adult)   Pulse 81   Temp 36.4 °C (97.5 °F) (Temporal)   Ht 1.651 m (5' 5\")   Wt 97.3 kg (214 lb 6.4 oz)   LMP 06/04/2005 (LMP Unknown)   SpO2 95%   BMI 35.68 kg/m²  Body mass index is 35.68 kg/m².    Constitutional: awake, alert, in no distress.  Skin: Warm, dry, good turgor, no rashes, bruises, ulcers in visible areas.  Eye: conjunctiva clear, lids neg for edema or lesions.  Neck: Trachea midline, no masses, no thyromegaly. No cervical or supraclavicular lymphadenopathy  Respiratory: Unlabored respiratory effort, lungs clear to auscultation, no wheezes, no rales.  Cardiovascular: Normal S1, S2, no murmur, no pedal edema.  Psych: Oriented x3, affect and mood wnl, intact judgement and insight.   MSK: soft tissue tenderness to palpation at the left distal sternum w/o edema, hematoma, or erythema. Pain is not reproducible with cough.     Return in about 6 months (around 11/29/2025) for Annual wellness visit.    We use [employer] (etouches-powered program) to document the visit. I also use Dragon (voice recognition software) to dictate part of the note. I have made every reasonable attempt to correct obvious errors, but I expect that there are errors of grammar and possibly content that I did not discover before finalizing the note.           [1]   Current Outpatient Medications:     budesonide-formoterol (SYMBICORT) 160-4.5 MCG/ACT Aerosol, Inhale 2 Puffs 2 times a day., Disp: 10.2 g, Rfl: 5    metFORMIN ER (GLUCOPHAGE XR) 500 MG TABLET SR 24 HR, Take 1 Tablet by mouth 2 times a day., Disp: 180 Tablet, Rfl: 3    " rosuvastatin (CRESTOR) 20 MG Tab, Take 1 Tablet by mouth every evening., Disp: 90 Tablet, Rfl: 3    telmisartan (MICARDIS) 40 MG Tab, Take 1 Tablet by mouth every day., Disp: 90 Tablet, Rfl: 3    albuterol 108 (90 Base) MCG/ACT Aero Soln inhalation aerosol, Inhale 2 Puffs every 6 hours as needed for Shortness of Breath., Disp: 8.5 g, Rfl: 11    Cholecalciferol (D3 PO), Take 1 Capsule by mouth every day., Disp: , Rfl:     Cyanocobalamin (B-12 PO), Take 1 Tablet by mouth every day., Disp: , Rfl:

## 2025-05-30 ENCOUNTER — RESULTS FOLLOW-UP (OUTPATIENT)
Dept: URGENT CARE | Facility: CLINIC | Age: 75
End: 2025-05-30

## 2025-05-30 ENCOUNTER — OFFICE VISIT (OUTPATIENT)
Dept: URGENT CARE | Facility: CLINIC | Age: 75
End: 2025-05-30
Payer: MEDICARE

## 2025-05-30 VITALS
SYSTOLIC BLOOD PRESSURE: 122 MMHG | DIASTOLIC BLOOD PRESSURE: 82 MMHG | OXYGEN SATURATION: 94 % | TEMPERATURE: 98.6 F | HEART RATE: 81 BPM | WEIGHT: 210 LBS | RESPIRATION RATE: 19 BRPM | HEIGHT: 65 IN | BODY MASS INDEX: 34.99 KG/M2

## 2025-05-30 DIAGNOSIS — J06.9 UPPER RESPIRATORY INFECTION, ACUTE: Primary | ICD-10-CM

## 2025-05-30 DIAGNOSIS — Z20.828 EXPOSURE TO INFLUENZA: ICD-10-CM

## 2025-05-30 DIAGNOSIS — R05.1 ACUTE COUGH: ICD-10-CM

## 2025-05-30 DIAGNOSIS — M79.10 MYALGIA: ICD-10-CM

## 2025-05-30 RX ORDER — DEXTROMETHORPHAN HYDROBROMIDE AND PROMETHAZINE HYDROCHLORIDE 15; 6.25 MG/5ML; MG/5ML
5 SYRUP ORAL EVERY 4 HOURS PRN
Qty: 120 ML | Refills: 0 | Status: SHIPPED | OUTPATIENT
Start: 2025-05-30

## 2025-05-30 RX ORDER — BENZONATATE 100 MG/1
100 CAPSULE ORAL 3 TIMES DAILY PRN
Qty: 60 CAPSULE | Refills: 0 | Status: SHIPPED | OUTPATIENT
Start: 2025-05-30

## 2025-05-30 ASSESSMENT — FIBROSIS 4 INDEX: FIB4 SCORE: 0.62

## 2025-05-30 NOTE — PROGRESS NOTES
"Lauren Esquivel is a 74 y.o. female who presents for Sore Throat (today) and Flu Like Symptoms (today)      HPI  This is a new problem. Lauren Esquivel is a 74 y.o. patient who presents to urgent care with c/o: Sore throat and bodyaches.  Sudden onset of the symptoms during the night.  She felt well when she went to bed last night.  Her brother who lives with her just got home from a cruise and he currently has influenza A.  She reports that she has tried to keep distance from him since he has been home.  Treatments tried: None.  Denies shortness of breath, chest pain, nausea, vomiting.  She uses a CPAP with oxygen at night secondary to previous respiratory failure and hypoxia.  She sees a pulmonologist.  She takes all of her medications as they are prescribed to her.  Her daytime oxygen is usually around 94%.    ROS See HPI    Allergies:     Allergies[1]    PMSFS Hx:  Past Medical History[2]  Past Surgical History[3]  Family History   Problem Relation Age of Onset    Lung Disease Mother     Hypertension Mother     Heart Disease Father     Hypertension Maternal Grandfather     Stroke Brother     Heart Disease Brother      Social History     Tobacco Use    Smoking status: Never    Smokeless tobacco: Never   Substance Use Topics    Alcohol use: No     Alcohol/week: 0.0 oz         Problems:   Problem List[4]    Medications:   Medications Ordered Prior to Encounter[5]     Objective:     /82   Pulse 81   Temp 37 °C (98.6 °F) (Temporal)   Resp 19   Ht 1.651 m (5' 5\")   Wt 95.3 kg (210 lb)   LMP 06/04/2005 (LMP Unknown)   SpO2 94%   BMI 34.95 kg/m²     Physical Exam  Vitals and nursing note reviewed.   Constitutional:       General: She is not in acute distress.     Appearance: Normal appearance. She is well-developed. She is not ill-appearing or toxic-appearing.   HENT:      Right Ear: Hearing, tympanic membrane, ear canal and external ear normal.      Left Ear: Hearing, tympanic membrane, ear " canal and external ear normal.      Nose:      Right Sinus: No frontal sinus tenderness.      Left Sinus: No frontal sinus tenderness.      Mouth/Throat:      Mouth: Mucous membranes are moist.      Pharynx: Uvula midline. Posterior oropharyngeal erythema present. No oropharyngeal exudate.      Tonsils: No tonsillar abscesses.   Eyes:      General: Lids are normal.      Pupils: Pupils are equal, round, and reactive to light.   Neck:      Trachea: Trachea and phonation normal.   Cardiovascular:      Rate and Rhythm: Normal rate and regular rhythm.      Pulses: Normal pulses.      Heart sounds: Normal heart sounds.   Pulmonary:      Effort: Pulmonary effort is normal.      Breath sounds: Normal breath sounds.   Musculoskeletal:         General: Normal range of motion.      Cervical back: Full passive range of motion without pain and normal range of motion.   Lymphadenopathy:      Head:      Right side of head: No tonsillar adenopathy.      Left side of head: No tonsillar adenopathy.      Upper Body:      Right upper body: No supraclavicular adenopathy.      Left upper body: No supraclavicular adenopathy.   Skin:     General: Skin is warm and dry.      Capillary Refill: Capillary refill takes less than 2 seconds.   Neurological:      Mental Status: She is alert and oriented to person, place, and time.      Deep Tendon Reflexes: Reflexes are normal and symmetric.   Psychiatric:         Mood and Affect: Mood normal.         Speech: Speech normal.         Behavior: Behavior normal.       Results for orders placed or performed in visit on 05/30/25   POCT CoV-2, Flu A/B, RSV by PCR    Collection Time: 05/30/25 11:22 AM   Result Value Ref Range    SARS-CoV-2 by PCR Negative Negative, Invalid    Influenza virus A RNA Negative Negative, Invalid    Influenza virus B, PCR Negative Negative, Invalid    RSV, PCR Negative Negative, Invalid     *Note: Due to a large number of results and/or encounters for the requested time period,  some results have not been displayed. A complete set of results can be found in Results Review.         Assessment /Associated Orders:      1. Upper respiratory infection, acute        2. Acute cough  POCT CoV-2, Flu A/B, RSV by PCR    benzonatate (TESSALON) 100 MG Cap    promethazine-dextromethorphan (PROMETHAZINE-DM) 6.25-15 MG/5ML syrup      3. Exposure to influenza  POCT CoV-2, Flu A/B, RSV by PCR      4. Myalgia  POCT CoV-2, Flu A/B, RSV by PCR              Medical Decision Making:    Lauren Esquivel is a very pleasant 74 y.o. female who is clinically stable at today's acute urgent care visit. Presents with acute problem/ concern today.    No acute distress is noted at the time of the visit.  VSS. Appropriate for outpatient care at this time. SpO2 at baseline 94%. No acute resp distress. Negative viral panel.     CALL WITH RESULTS OKAY TO LEAVE MESSAGE CELL PHONE NUMBER     Pt called with results. Message left on her answering machine as requested.     Educated in proper administration of  prescription medication(s) ordered today including safety, possible SE, risks, benefits, rationale and alternatives to therapy.   Educated not to drive, drink alcohol, operate machinery, or do anything that requires mental alertness while taking  promethazine-DM RX medication that has sedation/ drowsiness as a side effect.  Do not combine this medication with other cough and cold medications.  Allow an 6-hour wear off time before participating in any of these activities.  Discussed that this illness appears to be viral in nature. I did not see any evidence of a bacterial infection on exam today.   OTC medications can be used for symptom relief. Follow manufacturers guidelines for dosing and instructions.  These OTC medications will not make you better any faster, but can help reduce your discomfort by treating the symptoms.   OTC Antipyretic of choice prn (Acetaminophen, Ibuprofen) for fevers greater than or equal to  101.5 * F or 38.6*C   Keep well hydrated  Increase rest      Through shared decision making a discussion of the Dx and DDx, management options (risks,benefits, and alternatives to planned treatment), natural progression, supportive care and indications for immediate follow-up discussed. Expressed understanding and the treatment plan was agreed upon.    Questions were encouraged and answered     Follow Up:   Return to urgent care prn if new or worsening sx or if there is no improvement in condition prn.    Educated in Red flags and indications to immediately call 911 or present to the Emergency Department.       Time I spent evaluating Lauren Esquivel in urgent care today was 30  minutes. This time includes preparing for visit, reviewing any pertinent notes or test results, exam, obtaining HPI, interpretation of lab tests,counseling/education, medication management ( RX and/ or OTC)  and documentation as indicated above.Time does not include separately billable procedures if noted .       Please note that this dictation was created using voice recognition software. I have worked with consultants from the vendor as well as technical experts from [facility] to optimize the interface. I have made every reasonable attempt to correct obvious errors, but I expect that there are errors of grammar and possibly content that I did not discover before finalizing the note.  This note was electronically signed by provider           [1]   Allergies  Allergen Reactions    Ace Inhibitors      Cough    Ciprofloxacin     Levaquin [Levofloxacin]    [2]   Past Medical History:  Diagnosis Date    Anemia     Anxiety state, unspecified 1/3/2010    ASTHMA     Chronic rhinitis 1/3/2010    COPD     Elevated C-reactive protein (CRP) 2/6/2012    H/O pyelonephritis 8/21/2012    H/O: female infertility 8/10/2011    History of nephrolithiasis 8/21/2012    HTN     HTN (hypertension) 8/11/2010    Hyperglycemia 6/4/2012    Hyperlipidemia  8/28/2010    Influenza A with pneumonia 12/27/2016    [facility]'s admission 2016.     Kidney filling defect 8/21/2012    Laryngeal spasm 1/3/2010    Metabolic syndrome 6/4/2012    Mycoplasma pneumonia 2003    Obesity 6/4/2012    Preventative health care 8/11/2010    Pyelonephritis 7/30/2012    Respiratory malfunction arising from mental factors 1/3/2010    Sleep apnea     Vitamin d deficiency 8/28/2010    Wheeze 12/4/2009   [3]   Past Surgical History:  Procedure Laterality Date    LAPAROSCOPY      for infertility    TONSILLECTOMY AND ADENOIDECTOMY     [4]   Patient Active Problem List  Diagnosis    SENTHIL on CPAP    HTN (hypertension)    Hyperlipidemia    History of nephrolithiasis    Moderate persistent asthma without complication    Prediabetes    Heart murmur    Chronic respiratory failure with hypoxia (HCC)    History of vertebral compression fracture_L1    Severe obesity (BMI 35.0-39.9) with comorbidity (HCC)   [5]   Current Outpatient Medications on File Prior to Visit   Medication Sig Dispense Refill    budesonide-formoterol (SYMBICORT) 160-4.5 MCG/ACT Aerosol Inhale 2 Puffs 2 times a day. 10.2 g 5    metFORMIN ER (GLUCOPHAGE XR) 500 MG TABLET SR 24 HR Take 1 Tablet by mouth 2 times a day. 180 Tablet 3    rosuvastatin (CRESTOR) 20 MG Tab Take 1 Tablet by mouth every evening. 90 Tablet 3    telmisartan (MICARDIS) 40 MG Tab Take 1 Tablet by mouth every day. 90 Tablet 3    albuterol 108 (90 Base) MCG/ACT Aero Soln inhalation aerosol Inhale 2 Puffs every 6 hours as needed for Shortness of Breath. 8.5 g 11    Cholecalciferol (D3 PO) Take 1 Capsule by mouth every day.      Cyanocobalamin (B-12 PO) Take 1 Tablet by mouth every day.       No current facility-administered medications on file prior to visit.

## 2025-06-02 ENCOUNTER — HOSPITAL ENCOUNTER (OUTPATIENT)
Dept: LAB | Facility: MEDICAL CENTER | Age: 75
End: 2025-06-02
Attending: FAMILY MEDICINE
Payer: MEDICARE

## 2025-06-02 DIAGNOSIS — R73.03 PREDIABETES: ICD-10-CM

## 2025-06-02 DIAGNOSIS — E78.00 PURE HYPERCHOLESTEROLEMIA: ICD-10-CM

## 2025-06-02 DIAGNOSIS — Z77.29 EXPOSURE TO RODENT: ICD-10-CM

## 2025-06-02 DIAGNOSIS — I10 PRIMARY HYPERTENSION: ICD-10-CM

## 2025-06-02 LAB
ALBUMIN SERPL BCP-MCNC: 4.3 G/DL (ref 3.2–4.9)
ALBUMIN/GLOB SERPL: 1.3 G/DL
ALP SERPL-CCNC: 54 U/L (ref 30–99)
ALT SERPL-CCNC: 64 U/L (ref 2–50)
ANION GAP SERPL CALC-SCNC: 13 MMOL/L (ref 7–16)
AST SERPL-CCNC: 53 U/L (ref 12–45)
BASOPHILS # BLD AUTO: 1.2 % (ref 0–1.8)
BASOPHILS # BLD: 0.05 K/UL (ref 0–0.12)
BILIRUB SERPL-MCNC: 0.7 MG/DL (ref 0.1–1.5)
BUN SERPL-MCNC: 13 MG/DL (ref 8–22)
CALCIUM ALBUM COR SERPL-MCNC: 9.1 MG/DL (ref 8.5–10.5)
CALCIUM SERPL-MCNC: 9.3 MG/DL (ref 8.5–10.5)
CHLORIDE SERPL-SCNC: 104 MMOL/L (ref 96–112)
CHOLEST SERPL-MCNC: 128 MG/DL (ref 100–199)
CO2 SERPL-SCNC: 23 MMOL/L (ref 20–33)
CREAT SERPL-MCNC: 0.82 MG/DL (ref 0.5–1.4)
EOSINOPHIL # BLD AUTO: 0.36 K/UL (ref 0–0.51)
EOSINOPHIL NFR BLD: 8.3 % (ref 0–6.9)
ERYTHROCYTE [DISTWIDTH] IN BLOOD BY AUTOMATED COUNT: 46.8 FL (ref 35.9–50)
EST. AVERAGE GLUCOSE BLD GHB EST-MCNC: 131 MG/DL
FASTING STATUS PATIENT QL REPORTED: NORMAL
GFR SERPLBLD CREATININE-BSD FMLA CKD-EPI: 74 ML/MIN/1.73 M 2
GLOBULIN SER CALC-MCNC: 3.4 G/DL (ref 1.9–3.5)
GLUCOSE SERPL-MCNC: 99 MG/DL (ref 65–99)
HBA1C MFR BLD: 6.2 % (ref 4–5.6)
HCT VFR BLD AUTO: 44.6 % (ref 37–47)
HDLC SERPL-MCNC: 60 MG/DL
HGB BLD-MCNC: 13.9 G/DL (ref 12–16)
IMM GRANULOCYTES # BLD AUTO: 0.01 K/UL (ref 0–0.11)
IMM GRANULOCYTES NFR BLD AUTO: 0.2 % (ref 0–0.9)
LDLC SERPL CALC-MCNC: 50 MG/DL
LYMPHOCYTES # BLD AUTO: 1.32 K/UL (ref 1–4.8)
LYMPHOCYTES NFR BLD: 30.6 % (ref 22–41)
MCH RBC QN AUTO: 28 PG (ref 27–33)
MCHC RBC AUTO-ENTMCNC: 31.2 G/DL (ref 32.2–35.5)
MCV RBC AUTO: 89.9 FL (ref 81.4–97.8)
MONOCYTES # BLD AUTO: 0.77 K/UL (ref 0–0.85)
MONOCYTES NFR BLD AUTO: 17.8 % (ref 0–13.4)
NEUTROPHILS # BLD AUTO: 1.81 K/UL (ref 1.82–7.42)
NEUTROPHILS NFR BLD: 41.9 % (ref 44–72)
NRBC # BLD AUTO: 0 K/UL
NRBC BLD-RTO: 0 /100 WBC (ref 0–0.2)
PLATELET # BLD AUTO: 240 K/UL (ref 164–446)
PMV BLD AUTO: 10.4 FL (ref 9–12.9)
POTASSIUM SERPL-SCNC: 3.9 MMOL/L (ref 3.6–5.5)
PROT SERPL-MCNC: 7.7 G/DL (ref 6–8.2)
RBC # BLD AUTO: 4.96 M/UL (ref 4.2–5.4)
SODIUM SERPL-SCNC: 140 MMOL/L (ref 135–145)
TRIGL SERPL-MCNC: 88 MG/DL (ref 0–149)
WBC # BLD AUTO: 4.3 K/UL (ref 4.8–10.8)

## 2025-06-02 PROCEDURE — 82570 ASSAY OF URINE CREATININE: CPT

## 2025-06-02 PROCEDURE — 36415 COLL VENOUS BLD VENIPUNCTURE: CPT

## 2025-06-02 PROCEDURE — 82043 UR ALBUMIN QUANTITATIVE: CPT

## 2025-06-02 PROCEDURE — 85025 COMPLETE CBC W/AUTO DIFF WBC: CPT

## 2025-06-02 PROCEDURE — 83036 HEMOGLOBIN GLYCOSYLATED A1C: CPT | Mod: GA

## 2025-06-02 PROCEDURE — 80061 LIPID PANEL: CPT

## 2025-06-02 PROCEDURE — 80053 COMPREHEN METABOLIC PANEL: CPT

## 2025-06-02 RX ORDER — FLUTICASONE PROPIONATE AND SALMETEROL 100; 50 UG/1; UG/1
1 POWDER RESPIRATORY (INHALATION) EVERY 12 HOURS
Qty: 60 EACH | Refills: 11 | Status: SHIPPED | OUTPATIENT
Start: 2025-06-02 | End: 2025-06-04

## 2025-06-03 LAB
CREAT UR-MCNC: 79.4 MG/DL
MICROALBUMIN UR-MCNC: <1.2 MG/DL
MICROALBUMIN/CREAT UR: NORMAL MG/G (ref 0–30)

## 2025-06-05 LAB
SIN NOMBRE VIRUS 93385: NORMAL
SIN NOMBRE VIRUS IGM 93386: NORMAL

## 2025-06-06 ENCOUNTER — RESULTS FOLLOW-UP (OUTPATIENT)
Dept: MEDICAL GROUP | Facility: MEDICAL CENTER | Age: 75
End: 2025-06-06
Payer: MEDICARE

## 2025-06-06 DIAGNOSIS — R79.89 ELEVATED LFTS: Primary | ICD-10-CM

## 2025-06-06 DIAGNOSIS — R73.03 PREDIABETES: ICD-10-CM

## 2025-06-11 ENCOUNTER — PHYSICAL THERAPY (OUTPATIENT)
Dept: PHYSICAL THERAPY | Facility: MEDICAL CENTER | Age: 75
End: 2025-06-11
Attending: FAMILY MEDICINE
Payer: MEDICARE

## 2025-06-11 DIAGNOSIS — H81.10 BPPV (BENIGN PAROXYSMAL POSITIONAL VERTIGO), UNSPECIFIED LATERALITY: Primary | ICD-10-CM

## 2025-06-11 PROCEDURE — 97162 PT EVAL MOD COMPLEX 30 MIN: CPT

## 2025-06-11 PROCEDURE — 97535 SELF CARE MNGMENT TRAINING: CPT

## 2025-06-11 NOTE — OP THERAPY EVALUATION
Outpatient Physical Therapy  VESTIBULAR EVALUATION    [facility] Outpatient Physical Therapy  [address]  [place] [address]  Phone:  [phone]  Fax:  [phone]    Date of Evaluation: 06/11/2025    Patient: Lauren Esquivel  YOB: 1950  MRN: [id number]     Referring Provider: Yessy Paz M.D.  [address] [facility]  [address]  [place] [address]   Referring Diagnosis: Benign paroxysmal positional vertigo, unspecified laterality [H81.10]     Time Calculation    Start time: 0831  Stop time: 0917 Time Calculation (min): 46 minutes           Chief Complaint: Vertigo    Visit Diagnoses     ICD-10-CM   1. BPPV (benign paroxysmal positional vertigo), unspecified laterality  H81.10         History of Present Illness:     Mechanism of injury:    Lauren presents to PT for support in managing her dizziness. This started in March 2025. States she recently lost her spouse and was having a hard time sleeping. Recalls taking a sleep aid for the first time and waking up with the dizziness sometime the next day. Describes experiencing room spinning vertigo that was primarily present during position change (quick turns of the head, laying down, stooping over, coming to standing quickly). This did impact her balance, but did not cause nausea or vomiting. Fortunately, she reports the dizziness improved spontaneously shortly after her PCP visit which brought on this referral on 4/17/25. The intense phase was about 2-3 weeks. Did not require the meclizine that was Rx'd.     Does report hearing loss, which has been gradually progressive over years. Also had a severe URI at the end of February and March, requiring 2 visits to urgent care. Also hospitalized in May 2025 for Parainfluenza and UTI.   Symptoms:     Progression:  Improving  Treatments:     No prior treatments received        Past Medical History[1]  Past Surgical History[2]  Social History     Tobacco Use    Smoking  "status: Never    Smokeless tobacco: Never   Substance Use Topics    Alcohol use: No     Alcohol/week: 0.0 oz     Social Hx - Family and Occupational[3]    Objective:    Oculomotor Exam:         Details: No spontaneous nystagmus central gaze          Details: No spontaneous nystagmus eccentric gaze    No saccadic eye movements    Smooth pursuit present    Convergence:        Normal convergence  Active Range of Motion:     Within functional limits  Limb Ataxia Exam:     Finger-to-Nose:         Intention tremor: none    Dysdiadochokinesia: none.  Strength Exam:     Comments:   30\" STS= 7 reps    Functional Gait Analysis  1) Gait level surface: 2  2) Change in gait speed: 3  3) Gait with horizontal head turns: 3  4) Gait with vertical head turns: 2  5) Gait and pivot turn: 3  6) Step over obstacle: 2  7) Gait with narrow MIHAELA: 1  8) Gait with eyes closed: 2  9) Ambulating backwards: 2  10) Steps: 2    Total= 22/30    Sensation Tests:     Left Light Touch Sensation:         All left lumbar dermatomes intact      Right Light Touch Sensation:         All right lumbar dermatomes intact    Vestibulo-Ocular Exam:   Normal head thrust test  BPPV Exam:     Normal Lucio-Hallpike    Normal straight head-hanging test    Normal roll test        Therapeutic Treatments and Modalities:     1. Self Care ADL Training (CPT 99691), Education: discussed vestibulopathies, specifically BPPV vs vestibular neuritis and how PT would support management, though testing outcomes were WNL today. Reviewed opportunities to develop strength and balance. Will further develop HEP next visit.    Time-based treatments/modalities:    Physical Therapy Timed Treatment Charges  Functional training, self care minutes (CPT 39899): 10 minutes        Assessment:     Functional impairments:  Decreased postural stability, gait abnormality/instability, decreased utilization of VIS/vest/somato and decreased range of motion/strength    Assessment details:    Lauren jones " "74 y.o female who presents to PT with a history of vertigo and imbalance that started in March of 2025 and spontaneously resolved in mid April. Subjectively, her symptoms are suspect for BPPV, though she was also fighting URI around that time, so vestibular neuritis could be considered. BPPV exam was unremarkable today. She demonstrates normal VOR function, but does have opportunities with regard to general strength and dynamic balance. FGA indicates moderate risk of falling (22/30). Recommending continuation of skilled PT services for strength, balance and VRT progressions to maximize functional independence and reduce risk of falls.     Prognosis: good    Goals:     Short term goals:    - Improve 30\" STS to 10 reps  - Able to stand on foam with EC x 30\" without LOB  - Complete 6 MWT    Short term goal time span:  1-2 weeks    Long term goals:    - Improve DHI to 20% or better  - Improve FGA to 26/30 or better to demonstrate reduce risk of falling  - Pt demonstrates independence with a HEP for continued management of this problem beyond discharge from therapy.       Long term goal time span:  6-8 weeks  Plan:     Therapy options:  Physical therapy treatment to continue    Planned therapy interventions:  Canalith Repositioning (CPT 44057), Therapeutic Activities (CPT 67758), Therapeutic Exercise (CPT 23533), Functional Training, Self Care (CPT 48782), Gait Training (CPT 80800) and Neuromuscular Re-education (CPT 73725)    Frequency: 1-2x/week.    Duration in weeks:  8    Discussed with:  Patient      Functional Assessment Used    DHI= 44%      Referring provider co-signature:  I have reviewed this plan of care and my co-signature certifies the need for services.    Certification Period: 06/11/2025 to  08/06/25    Physician Signature: ________________________________ Date: ______________               [1]   Past Medical History:  Diagnosis Date    Anemia     Anxiety state, unspecified 1/3/2010    ASTHMA     Chronic " rhinitis 1/3/2010    COPD     Elevated C-reactive protein (CRP) 2/6/2012    H/O pyelonephritis 8/21/2012    H/O: female infertility 8/10/2011    History of nephrolithiasis 8/21/2012    HTN     HTN (hypertension) 8/11/2010    Hyperglycemia 6/4/2012    Hyperlipidemia 8/28/2010    Influenza A with pneumonia 12/27/2016    [facility]'s admission 2016.     Kidney filling defect 8/21/2012    Laryngeal spasm 1/3/2010    Metabolic syndrome 6/4/2012    Mycoplasma pneumonia 2003    Obesity 6/4/2012    Preventative health care 8/11/2010    Pyelonephritis 7/30/2012    Respiratory malfunction arising from mental factors 1/3/2010    Sleep apnea     Vitamin d deficiency 8/28/2010    Wheeze 12/4/2009   [2]   Past Surgical History:  Procedure Laterality Date    LAPAROSCOPY      for infertility    TONSILLECTOMY AND ADENOIDECTOMY     [3]   Family and Occupational History  Socioeconomic History    Marital status:      Spouse name: Not on file    Number of children: 2    Years of education: Not on file    Highest education level: Not on file   Occupational History     Employer: [employer]

## 2025-06-12 DIAGNOSIS — I10 PRIMARY HYPERTENSION: ICD-10-CM

## 2025-06-12 NOTE — TELEPHONE ENCOUNTER
Received request via: Pharmacy    Was the patient seen in the last year in this department? Yes    Does the patient have an active prescription (recently filled or refills available) for medication(s) requested? No    Pharmacy Name: [facility]    Does the patient have FDC Plus and need 100-day supply? (This applies to ALL medications) Patient does not have SCP

## 2025-06-13 RX ORDER — TELMISARTAN 40 MG/1
40 TABLET ORAL
Qty: 90 TABLET | Refills: 3 | Status: SHIPPED | OUTPATIENT
Start: 2025-06-13

## 2025-06-18 NOTE — OP THERAPY DAILY TREATMENT
"  Outpatient Physical Therapy  DAILY TREATMENT     [facility] Outpatient Physical Therapy  [address]  [place] [address]  Phone:  [phone]  Fax:  [phone]    Date: 06/20/2025    Patient: Lauren Esquivel  YOB: 1950  MRN: [id number]     Time Calculation    Start time: 0930  Stop time: 1017 Time Calculation (min): 47 minutes         Chief Complaint: Loss Of Balance    Visit #: 2    SUBJECTIVE:  Dizziness is resolved. No new concerns. Looking forward to learning some exercises that can be done at home.     OBJECTIVE:      Therapeutic Exercises (CPT 30447):     1. 6MWT, 998 feet    2. floor transfer    3. standard bridge, 5\" x 10    4. SLR, x 10 ea    5. STS, 2x10      Therapeutic Exercise Summary:   Access Code: [email or web address]  URL: [email or web address]  Date: 06/20/2025  Prepared by:     Exercises  - Supine Bridge  - 5 x weekly - 2 sets - 10-15 reps  - Small Range Straight Leg Raise  - 5 x weekly - 2 sets - 10-15 reps  - Sit to Stand Without Arm Support  - 5 x weekly - 2-3 sets - 10 reps    Therapeutic Treatments and Modalities:     1. Self Care ADL Training (CPT 76064), Disussed interests in terms of long term exercise adherence. Has some interest in pool exercise. Offered information regarding the local [employer] pool. Also offered information regarding chair yoga. While researching community resources, will start her HEP and initiate walks at least a couple days a week at a local park since her neighborhood does not have sidewalks.    Time-based treatments/modalities:    Physical Therapy Timed Treatment Charges  Functional training, self care minutes (CPT 90916): 17 minutes  Therapeutic exercise minutes (CPT 70795): 30 minutes    ASSESSMENT:   Response to treatment: Limited hx with exercise and admits poor personal accountability for exercise, especially after the loss of her spouse. Will benefit from learning foundational concepts around " exercise and progression. Emphasizing long term support through community resources, but will start with a home based HEP primarily targeted at managing her B knee pain.     PLAN/RECOMMENDATIONS:   Plan for treatment: therapy treatment to continue next visit.  Planned interventions for next visit: continue with current treatment.

## 2025-06-20 ENCOUNTER — PHYSICAL THERAPY (OUTPATIENT)
Dept: PHYSICAL THERAPY | Facility: MEDICAL CENTER | Age: 75
End: 2025-06-20
Attending: FAMILY MEDICINE
Payer: MEDICARE

## 2025-06-20 DIAGNOSIS — H81.10 BPPV (BENIGN PAROXYSMAL POSITIONAL VERTIGO), UNSPECIFIED LATERALITY: Primary | ICD-10-CM

## 2025-06-20 PROCEDURE — 97535 SELF CARE MNGMENT TRAINING: CPT

## 2025-06-20 PROCEDURE — 97110 THERAPEUTIC EXERCISES: CPT

## 2025-06-24 NOTE — OP THERAPY DAILY TREATMENT
"  Outpatient Physical Therapy  DAILY TREATMENT     [facility] Outpatient Physical Therapy  [address]  [place] [address]  Phone:  [phone]  Fax:  [phone]    Date: 06/25/2025    Patient: Lauren Esquivel  YOB: 1950  MRN: [id number]     Time Calculation    Start time: 0918  Stop time: 1005 Time Calculation (min): 47 minutes         Chief Complaint: Weakness    Visit #: 3    SUBJECTIVE:  I did the sit to stands, but I didn't do the other ones because I couldn't find my mat.     OBJECTIVE:      Therapeutic Exercises (CPT 80916):     1. Recumbent bike, L3 x 5 min    2. shuttle, Squat: 5C x 20. SL 3C 2x10    3. Unilateral row, 10# 2x10    4. Lat pull, 20# x 20    5. SLR, 2x10, on floor with mat    6. bridge, 2x10, \"      Therapeutic Exercise Summary:   Access Code: [email or web address]  URL: [email or web address]  Date: 06/20/2025  Prepared by:     Exercises  - Supine Bridge  - 5 x weekly - 2 sets - 10-15 reps  - Small Range Straight Leg Raise  - 5 x weekly - 2 sets - 10-15 reps  - Sit to Stand Without Arm Support  - 5 x weekly - 2-3 sets - 10 reps    Time-based treatments/modalities:    Physical Therapy Timed Treatment Charges  Therapeutic exercise minutes (CPT 83210): 45 minutes    ASSESSMENT:   Response to treatment: Poor initiation of HEP due to limitation in space/mat. Provided further information for purchasing a mat and encouraged her to perform on the bed for now. Limited prior experience with exercise is a barrier to progress, though she is receptive to education. Continue to build a foundation to improve compliance with regular exercise.     PLAN/RECOMMENDATIONS:   Plan for treatment: therapy treatment to continue next visit.  Planned interventions for next visit: continue with current treatment.         "

## 2025-06-25 ENCOUNTER — PHYSICAL THERAPY (OUTPATIENT)
Dept: PHYSICAL THERAPY | Facility: MEDICAL CENTER | Age: 75
End: 2025-06-25
Attending: FAMILY MEDICINE
Payer: MEDICARE

## 2025-06-25 DIAGNOSIS — H81.10 BPPV (BENIGN PAROXYSMAL POSITIONAL VERTIGO), UNSPECIFIED LATERALITY: Primary | ICD-10-CM

## 2025-06-25 PROCEDURE — 97110 THERAPEUTIC EXERCISES: CPT

## 2025-06-26 NOTE — OP THERAPY DAILY TREATMENT
Outpatient Physical Therapy  DAILY TREATMENT     [facility] Outpatient Physical Therapy  [address]  [place] [address]  Phone:  [phone]  Fax:  [phone]    Date: 06/27/2025    Patient: aLuren Esquivel  YOB: 1950  MRN: [id number]     Time Calculation    Start time: 1104  Stop time: 1142 Time Calculation (min): 38 minutes         Chief Complaint: Weakness    Visit #: 4    SUBJECTIVE:  I felt proud of myself when I left here last time, but I was surprised I was fatigued when I got home. I had several MD appts yesterday, so I didn't get to do any HEP.     OBJECTIVE:      Therapeutic Exercises (CPT 26696):     1. NuStep, L4 x 1 lap, 7:35    2. shuttle, Squat: 5C 3x10. SL 3C 1x10, second set of SL limited by L knee pain    3. ball rolls, x 20    4. ball bridge, 2x10    5. SL hip abd, 2x10    6. SLR, review      Therapeutic Exercise Summary:   Access Code: [email or web address]  URL: [email or web address]  Date: 06/20/2025  Prepared by:     Exercises  - Supine Bridge  - 5 x weekly - 2 sets - 10-15 reps  - Small Range Straight Leg Raise  - 5 x weekly - 2 sets - 10-15 reps  - Sit to Stand Without Arm Support  - 5 x weekly - 2-3 sets - 10 reps      Time-based treatments/modalities:    Physical Therapy Timed Treatment Charges  Therapeutic exercise minutes (CPT 45574): 38 minutes    ASSESSMENT:   Response to treatment: Able to increase distance for CV endurance today, maintaining overall level of exertion bw 3-4/10. Introduction of ball offered good challenge to midline stability. Will continue to benefit from skilled guidance for strength progressions and development of exercise knowledge for eventual community integration    PLAN/RECOMMENDATIONS:   Plan for treatment: therapy treatment to continue next visit.  Planned interventions for next visit: continue with current treatment.

## 2025-06-27 ENCOUNTER — PHYSICAL THERAPY (OUTPATIENT)
Dept: PHYSICAL THERAPY | Facility: MEDICAL CENTER | Age: 75
End: 2025-06-27
Attending: FAMILY MEDICINE
Payer: MEDICARE

## 2025-06-27 DIAGNOSIS — H81.10 BPPV (BENIGN PAROXYSMAL POSITIONAL VERTIGO), UNSPECIFIED LATERALITY: Primary | ICD-10-CM

## 2025-06-27 PROCEDURE — 97110 THERAPEUTIC EXERCISES: CPT

## 2025-07-01 NOTE — OP THERAPY DAILY TREATMENT
"  Outpatient Physical Therapy  DAILY TREATMENT     [facility] Outpatient Physical Therapy  [address]  [place] [address]  Phone:  [phone]  Fax:  [phone]    Date: 07/02/2025    Patient: Lauren Esquivel  YOB: 1950  MRN: [id number]     Time Calculation    Start time: 0916  Stop time: 1000 Time Calculation (min): 44 minutes         Chief Complaint: Vertigo    Visit #: 5    SUBJECTIVE:  I had a fall 2 days ago while working in the garden. I just didn't have good footing. Fell onto her L knee. It was sore yesterday, but better today.     OBJECTIVE:      Therapeutic Exercises (CPT 44577):     1. NuStep, L4 x 1 lap, = 5:57    2. ball rolls with HS press, 2x10    3. ball bridge, 2x10    4. 90/90 hold, 2x30\"    5. SL hip abd, 2x10    6. staggered stance DL, 10# x 10 ea      Therapeutic Exercise Summary:   Access Code: [email or web address]  URL: [email or web address]  Date: 06/20/2025  Prepared by:     Exercises  - Supine Bridge  - 5 x weekly - 2 sets - 10-15 reps  - Small Range Straight Leg Raise  - 5 x weekly - 2 sets - 10-15 reps  - Sit to Stand Without Arm Support  - 5 x weekly - 2-3 sets - 10 reps      Time-based treatments/modalities:    Physical Therapy Timed Treatment Charges  Therapeutic exercise minutes (CPT 74476): 44 minutes    ASSESSMENT:   Response to treatment: Improved self pacing and knowledge of effort levels allows for improved speed during CV loading. Apprehensive hip hinge due to hx of lumbar fx, but well tolerated. Continue to improve independence with exercises emphasizing functional movement patterns to support transition into a community program.     PLAN/RECOMMENDATIONS:   Plan for treatment: therapy treatment to continue next visit.  Planned interventions for next visit: continue with current treatment.         "

## 2025-07-02 ENCOUNTER — PHYSICAL THERAPY (OUTPATIENT)
Dept: PHYSICAL THERAPY | Facility: MEDICAL CENTER | Age: 75
End: 2025-07-02
Attending: FAMILY MEDICINE
Payer: MEDICARE

## 2025-07-02 DIAGNOSIS — H81.10 BPPV (BENIGN PAROXYSMAL POSITIONAL VERTIGO), UNSPECIFIED LATERALITY: Primary | ICD-10-CM

## 2025-07-02 PROCEDURE — 97110 THERAPEUTIC EXERCISES: CPT

## 2025-07-07 NOTE — OP THERAPY DAILY TREATMENT
"  Outpatient Physical Therapy  DAILY TREATMENT     [facility] Outpatient Physical Therapy  [address]  [place] [address]  Phone:  [phone]  Fax:  [phone]    Date: 07/09/2025    Patient: Lauren Esquivel  YOB: 1950  MRN: [id number]     Time Calculation    Start time: 1135  Stop time: 1215 Time Calculation (min): 40 minutes         Chief Complaint: Vertigo    Visit #: 6    SUBJECTIVE:  I've been busy with work the last couple of days, so I haven't been good about exercising, but I did decide to take 2 trips to get my trash can to get the extra steps. Planning to visit the [employer] today to see if it would be an option for community transition.     OBJECTIVE:        Therapeutic Exercises (CPT 27604):     1. NuStep, L4 x 1 lap, = 5:57    2. TKE, Ball 5\" x 15 ea    3. counter push up, 2x10    4. diagonal band pull, orange 2x10 ea    5. [place] press, 3# 2x10 ea    6. Shuttle, Squat: 5C 2x10, SL 3C 2x10 ea      Therapeutic Exercise Summary:   Access Code: [email or web address]  URL: [email or web address]  Date: 06/20/2025  Prepared by:     Exercises  - Supine Bridge  - 5 x weekly - 2 sets - 10-15 reps  - Small Range Straight Leg Raise  - 5 x weekly - 2 sets - 10-15 reps  - Sit to Stand Without Arm Support  - 5 x weekly - 2-3 sets - 10 reps      Time-based treatments/modalities:    Physical Therapy Timed Treatment Charges  Therapeutic exercise minutes (CPT 35574): 40 minutes    ASSESSMENT:   Response to treatment: Apprehension during new tasks is reducing as her exercise competence improves. L anterior knee pain does limit repetition on some tasks, but generally well tolerated. Would be nice to see improved HEP compliance, but happy to see improvements in lifestyle choices to generate increased activity. Hopeful for transition to a community resource in the next 2 visits.     PLAN/RECOMMENDATIONS:   Plan for treatment: therapy treatment to continue " next visit.  Planned interventions for next visit: continue with current treatment.

## 2025-07-09 ENCOUNTER — PHYSICAL THERAPY (OUTPATIENT)
Dept: PHYSICAL THERAPY | Facility: MEDICAL CENTER | Age: 75
End: 2025-07-09
Attending: FAMILY MEDICINE
Payer: MEDICARE

## 2025-07-09 DIAGNOSIS — H81.10 BPPV (BENIGN PAROXYSMAL POSITIONAL VERTIGO), UNSPECIFIED LATERALITY: Primary | ICD-10-CM

## 2025-07-09 PROCEDURE — 97110 THERAPEUTIC EXERCISES: CPT

## 2025-07-10 DIAGNOSIS — R73.03 PREDIABETES: ICD-10-CM

## 2025-07-11 ENCOUNTER — TELEPHONE (OUTPATIENT)
Dept: SLEEP MEDICINE | Facility: MEDICAL CENTER | Age: 75
End: 2025-07-11
Payer: MEDICARE

## 2025-07-11 ENCOUNTER — PHYSICAL THERAPY (OUTPATIENT)
Dept: PHYSICAL THERAPY | Facility: MEDICAL CENTER | Age: 75
End: 2025-07-11
Attending: FAMILY MEDICINE
Payer: MEDICARE

## 2025-07-11 DIAGNOSIS — H81.10 BPPV (BENIGN PAROXYSMAL POSITIONAL VERTIGO), UNSPECIFIED LATERALITY: Primary | ICD-10-CM

## 2025-07-11 PROCEDURE — 97110 THERAPEUTIC EXERCISES: CPT

## 2025-07-11 RX ORDER — METFORMIN HYDROCHLORIDE 500 MG/1
500 TABLET, EXTENDED RELEASE ORAL 2 TIMES DAILY
Qty: 180 TABLET | Refills: 1 | Status: SHIPPED | OUTPATIENT
Start: 2025-07-11

## 2025-07-11 NOTE — TELEPHONE ENCOUNTER
Patient called needing to speak with the provider or the ma via a call about an oxygen question.      The patient is leaving for a 5 day trip to [place] and wants to know if she should bring her cpap with her. She also use's a nebulizer and oxygen but doesn't know which one she should take.        Please contact the patient to help answer her question she said its okay to leave a voicemail as well.

## 2025-07-11 NOTE — OP THERAPY DAILY TREATMENT
"  Outpatient Physical Therapy  DAILY TREATMENT     [facility] Outpatient Physical Therapy  [address]  [place] [address]  Phone:  [phone]  Fax:  [phone]    Date: 07/11/2025    Patient: Lauren Esquivel  YOB: 1950  MRN: [id number]     Time Calculation    Start time: 1101  Stop time: 1142 Time Calculation (min): 41 minutes         Chief Complaint: Other (Asthma )    Visit #: 7    SUBJECTIVE:  Patient reports that she was able to go to the [employer] pool and although she did not sign up, she does feel like it would be a good option following discharge. She does feel overwhelmed with all of the things she needs to get through after her 's passing and new left knee pain, but knows that a consistent routine and \"getting out\" would be helpful. Has one more visit scheduled and is expecting to discharge at that point.     Due to smoke does report and increase in SOB and uncertain of exercise tolerance today.     OBJECTIVE:  Current objective measures:           Therapeutic Exercises (CPT 97995):     1. NuStep, L4 x 1 lap, = 6:53    3. Review of HEP, See below for details    4. Rows, 2 x 15, orange TB, Added to HEP    5. Shoulder extension, 2 x 15, orange TB, Added to HEP    6. Resisted shldr horizontal abduction, 2 x 15, orange TB, Added to HEP    8. Bridge, 2 x 10    9. SLR, 2 x 10 B      Therapeutic Exercise Summary:   Access Code: [email or web address]  URL: [email or web address]  Date: 06/20/2025  Prepared by:     Exercises  - Supine Bridge  - 5 x weekly - 2 sets - 10-15 reps  - Small Range Straight Leg Raise  - 5 x weekly - 2 sets - 10-15 reps  - Sit to Stand Without Arm Support  - 5 x weekly - 2-3 sets - 10 reps    Sidelying hip abduction, counter push ups.       Time-based treatments/modalities:    Physical Therapy Timed Treatment Charges  Therapeutic exercise minutes (CPT 97317): 41 minutes        ASSESSMENT:   Response to treatment: Reviewed " and updated HEP today in preparation for upcoming discharge. Patient demonstrated good recall of exercises but variable consistency with carryover due to home stresses.     PLAN/RECOMMENDATIONS:   Plan for treatment: therapy treatment to continue next visit.  Planned interventions for next visit: continue with current treatment.

## 2025-07-11 NOTE — TELEPHONE ENCOUNTER
Called patient but there was no answer. LVM informing patient to take his CPAP and O2 for emergency use (although it was D/C from Dr. Harris).    Informed patient to call back with any more questions.

## 2025-07-11 NOTE — TELEPHONE ENCOUNTER
Please call patient to clarify if she is currently use Advair or Breyna as it is not advisable to take both.   Please let me know so that I can authorize appropriate refills.   Yessy Paz M.D.

## 2025-07-11 NOTE — TELEPHONE ENCOUNTER
Left pt a mess to call back reg Dr. Paz needing to clarify a medication refill received by the pharmacy. I also sent pt a mess via my chart.

## 2025-07-15 ENCOUNTER — TELEPHONE (OUTPATIENT)
Dept: HEALTH INFORMATION MANAGEMENT | Facility: OTHER | Age: 75
End: 2025-07-15
Payer: MEDICARE

## 2025-07-15 RX ORDER — BUDESONIDE AND FORMOTEROL FUMARATE 160; 4.5 UG/1; UG/1
2 AEROSOL, METERED RESPIRATORY (INHALATION) 2 TIMES DAILY
Qty: 10.3 EACH | Refills: 0 | OUTPATIENT
Start: 2025-07-15

## 2025-07-16 ENCOUNTER — APPOINTMENT (OUTPATIENT)
Dept: PHYSICAL THERAPY | Facility: MEDICAL CENTER | Age: 75
End: 2025-07-16
Attending: FAMILY MEDICINE
Payer: MEDICARE

## 2025-07-18 ENCOUNTER — APPOINTMENT (OUTPATIENT)
Dept: PHYSICAL THERAPY | Facility: MEDICAL CENTER | Age: 75
End: 2025-07-18
Attending: FAMILY MEDICINE
Payer: MEDICARE

## 2025-07-23 ENCOUNTER — APPOINTMENT (OUTPATIENT)
Dept: PHYSICAL THERAPY | Facility: MEDICAL CENTER | Age: 75
End: 2025-07-23
Attending: FAMILY MEDICINE
Payer: MEDICARE

## 2025-07-25 ENCOUNTER — APPOINTMENT (OUTPATIENT)
Dept: RADIOLOGY | Facility: MEDICAL CENTER | Age: 75
End: 2025-07-25
Attending: EMERGENCY MEDICINE
Payer: MEDICARE

## 2025-07-25 ENCOUNTER — APPOINTMENT (OUTPATIENT)
Dept: URGENT CARE | Facility: CLINIC | Age: 75
End: 2025-07-25
Payer: MEDICARE

## 2025-07-25 ENCOUNTER — HOSPITAL ENCOUNTER (EMERGENCY)
Facility: MEDICAL CENTER | Age: 75
End: 2025-07-25
Attending: EMERGENCY MEDICINE
Payer: MEDICARE

## 2025-07-25 VITALS
HEIGHT: 65 IN | WEIGHT: 204.59 LBS | DIASTOLIC BLOOD PRESSURE: 80 MMHG | TEMPERATURE: 98 F | HEART RATE: 94 BPM | SYSTOLIC BLOOD PRESSURE: 121 MMHG | BODY MASS INDEX: 34.09 KG/M2 | RESPIRATION RATE: 22 BRPM | OXYGEN SATURATION: 92 %

## 2025-07-25 DIAGNOSIS — J45.41 MODERATE PERSISTENT REACTIVE AIRWAY DISEASE WITH ACUTE EXACERBATION: Primary | ICD-10-CM

## 2025-07-25 LAB
ALBUMIN SERPL BCP-MCNC: 4.2 G/DL (ref 3.2–4.9)
ALBUMIN/GLOB SERPL: 1.2 G/DL
ALP SERPL-CCNC: 57 U/L (ref 30–99)
ALT SERPL-CCNC: 40 U/L (ref 2–50)
ANION GAP SERPL CALC-SCNC: 15 MMOL/L (ref 7–16)
AST SERPL-CCNC: 23 U/L (ref 12–45)
BASOPHILS # BLD AUTO: 0.7 % (ref 0–1.8)
BASOPHILS # BLD: 0.07 K/UL (ref 0–0.12)
BILIRUB SERPL-MCNC: 0.6 MG/DL (ref 0.1–1.5)
BUN SERPL-MCNC: 16 MG/DL (ref 8–22)
CALCIUM ALBUM COR SERPL-MCNC: 9.5 MG/DL (ref 8.5–10.5)
CALCIUM SERPL-MCNC: 9.7 MG/DL (ref 8.5–10.5)
CHLORIDE SERPL-SCNC: 102 MMOL/L (ref 96–112)
CO2 SERPL-SCNC: 21 MMOL/L (ref 20–33)
CREAT SERPL-MCNC: 0.75 MG/DL (ref 0.5–1.4)
EKG IMPRESSION: NORMAL
EOSINOPHIL # BLD AUTO: 1.05 K/UL (ref 0–0.51)
EOSINOPHIL NFR BLD: 10.7 % (ref 0–6.9)
ERYTHROCYTE [DISTWIDTH] IN BLOOD BY AUTOMATED COUNT: 45 FL (ref 35.9–50)
GFR SERPLBLD CREATININE-BSD FMLA CKD-EPI: 82 ML/MIN/1.73 M 2
GLOBULIN SER CALC-MCNC: 3.4 G/DL (ref 1.9–3.5)
GLUCOSE SERPL-MCNC: 111 MG/DL (ref 65–99)
HCT VFR BLD AUTO: 44.8 % (ref 37–47)
HGB BLD-MCNC: 14.8 G/DL (ref 12–16)
IMM GRANULOCYTES # BLD AUTO: 0.03 K/UL (ref 0–0.11)
IMM GRANULOCYTES NFR BLD AUTO: 0.3 % (ref 0–0.9)
LYMPHOCYTES # BLD AUTO: 2.02 K/UL (ref 1–4.8)
LYMPHOCYTES NFR BLD: 20.7 % (ref 22–41)
MCH RBC QN AUTO: 29 PG (ref 27–33)
MCHC RBC AUTO-ENTMCNC: 33 G/DL (ref 32.2–35.5)
MCV RBC AUTO: 87.7 FL (ref 81.4–97.8)
MONOCYTES # BLD AUTO: 0.8 K/UL (ref 0–0.85)
MONOCYTES NFR BLD AUTO: 8.2 % (ref 0–13.4)
NEUTROPHILS # BLD AUTO: 5.8 K/UL (ref 1.82–7.42)
NEUTROPHILS NFR BLD: 59.4 % (ref 44–72)
NRBC # BLD AUTO: 0 K/UL
NRBC BLD-RTO: 0 /100 WBC (ref 0–0.2)
NT-PROBNP SERPL IA-MCNC: 140 PG/ML (ref 0–125)
PLATELET # BLD AUTO: 378 K/UL (ref 164–446)
PMV BLD AUTO: 10 FL (ref 9–12.9)
POTASSIUM SERPL-SCNC: 4.1 MMOL/L (ref 3.6–5.5)
PROT SERPL-MCNC: 7.6 G/DL (ref 6–8.2)
RBC # BLD AUTO: 5.11 M/UL (ref 4.2–5.4)
SODIUM SERPL-SCNC: 138 MMOL/L (ref 135–145)
TROPONIN T SERPL-MCNC: 9 NG/L (ref 6–19)
WBC # BLD AUTO: 9.8 K/UL (ref 4.8–10.8)

## 2025-07-25 PROCEDURE — 83880 ASSAY OF NATRIURETIC PEPTIDE: CPT

## 2025-07-25 PROCEDURE — 96374 THER/PROPH/DIAG INJ IV PUSH: CPT

## 2025-07-25 PROCEDURE — 93005 ELECTROCARDIOGRAM TRACING: CPT | Mod: TC | Performed by: EMERGENCY MEDICINE

## 2025-07-25 PROCEDURE — 700111 HCHG RX REV CODE 636 W/ 250 OVERRIDE (IP): Performed by: EMERGENCY MEDICINE

## 2025-07-25 PROCEDURE — 80053 COMPREHEN METABOLIC PANEL: CPT

## 2025-07-25 PROCEDURE — 84484 ASSAY OF TROPONIN QUANT: CPT

## 2025-07-25 PROCEDURE — 99284 EMERGENCY DEPT VISIT MOD MDM: CPT

## 2025-07-25 PROCEDURE — 93005 ELECTROCARDIOGRAM TRACING: CPT | Mod: TC

## 2025-07-25 PROCEDURE — 94640 AIRWAY INHALATION TREATMENT: CPT

## 2025-07-25 PROCEDURE — 71045 X-RAY EXAM CHEST 1 VIEW: CPT

## 2025-07-25 PROCEDURE — 700101 HCHG RX REV CODE 250: Performed by: EMERGENCY MEDICINE

## 2025-07-25 PROCEDURE — 85025 COMPLETE CBC W/AUTO DIFF WBC: CPT

## 2025-07-25 PROCEDURE — 36415 COLL VENOUS BLD VENIPUNCTURE: CPT

## 2025-07-25 RX ORDER — DEXAMETHASONE SODIUM PHOSPHATE 4 MG/ML
10 INJECTION, SOLUTION INTRA-ARTICULAR; INTRALESIONAL; INTRAMUSCULAR; INTRAVENOUS; SOFT TISSUE ONCE
Status: COMPLETED | OUTPATIENT
Start: 2025-07-25 | End: 2025-07-25

## 2025-07-25 RX ORDER — PREDNISONE 20 MG/1
60 TABLET ORAL DAILY
Qty: 12 TABLET | Refills: 0 | Status: SHIPPED | OUTPATIENT
Start: 2025-07-25 | End: 2025-07-29

## 2025-07-25 RX ORDER — ALBUTEROL SULFATE 5 MG/ML
2.5 SOLUTION RESPIRATORY (INHALATION)
Status: DISCONTINUED | OUTPATIENT
Start: 2025-07-25 | End: 2025-07-25 | Stop reason: HOSPADM

## 2025-07-25 RX ADMIN — ALBUTEROL SULFATE 2.5 MG: 2.5 SOLUTION RESPIRATORY (INHALATION) at 12:20

## 2025-07-25 RX ADMIN — DEXAMETHASONE SODIUM PHOSPHATE 10 MG: 4 INJECTION INTRA-ARTICULAR; INTRALESIONAL; INTRAMUSCULAR; INTRAVENOUS; SOFT TISSUE at 11:27

## 2025-07-25 ASSESSMENT — FIBROSIS 4 INDEX: FIB4 SCORE: 2.07

## 2025-07-25 NOTE — DISCHARGE INSTRUCTIONS
Utilize your albuterol as discussed.  Return is not effective.  Follow-up with pulmonology as discussed.

## 2025-07-25 NOTE — ED NOTES
Patient reports she feels more short of breath and anxious. Audible inspiratory and expiratory wheezing heard. Dr. Kunz notified.

## 2025-07-25 NOTE — ED PROVIDER NOTES
"ED Provider Note    CHIEF COMPLAINT  Chief Complaint   Patient presents with    Shortness of Breath     For several days.   Reports taking \"so many medications!\", has been using nebs, inhalers, and her husbands albuterol to no avail. Reports feeling quite anxious and \"my whole body is inflamed!\"    Anxiety     Anxiety is visible.      HPI/ROS    Lauren Esquivel is a 74 y.o. female who presents with shortness of breath.  The patient has a known history of reactive airway disease.  And did finish a course of steroids about a week ago.  She states that she is been having increased work of breathing.  She states that her whole body feels inflamed.  She has been using on a lots of albuterol for her dyspnea.  She does not have any pain or swelling to her lower extremities.  She does have some periodic chest pain.  She has no known history of cardiac disease.    PAST MEDICAL HISTORY   has a past medical history of Anemia, Anxiety state, unspecified (1/3/2010), ASTHMA, Chronic rhinitis (1/3/2010), COPD, Elevated C-reactive protein (CRP) (2/6/2012), H/O pyelonephritis (8/21/2012), H/O: female infertility (8/10/2011), History of nephrolithiasis (8/21/2012), HTN, HTN (hypertension) (8/11/2010), Hyperglycemia (6/4/2012), Hyperlipidemia (8/28/2010), Influenza A with pneumonia (12/27/2016), Kidney filling defect (8/21/2012), Laryngeal spasm (1/3/2010), Metabolic syndrome (6/4/2012), Mycoplasma pneumonia (2003), Obesity (6/4/2012), Preventative health care (8/11/2010), Pyelonephritis (7/30/2012), Respiratory malfunction arising from mental factors (1/3/2010), Sleep apnea, Vitamin d deficiency (8/28/2010), and Wheeze (12/4/2009).    SURGICAL HISTORY   has a past surgical history that includes laparoscopy and tonsillectomy and adenoidectomy.    FAMILY HISTORY  Family History   Problem Relation Age of Onset    Lung Disease Mother     Hypertension Mother     Heart Disease Father     Hypertension Maternal Grandfather     Stroke " "Brother     Heart Disease Brother        SOCIAL HISTORY  Social History     Tobacco Use    Smoking status: Never    Smokeless tobacco: Never   Vaping Use    Vaping status: Never Used   Substance and Sexual Activity    Alcohol use: Never    Drug use: Never    Sexual activity: Yes     Partners: Male       CURRENT MEDICATIONS  Home Medications       Reviewed by Jovanny Jennings R.N. (Registered Nurse) on 07/25/25 at 1030  Med List Status: Not Addressed     Medication Last Dose Status   albuterol 108 (90 Base) MCG/ACT Aero Soln inhalation aerosol  Active   benzonatate (TESSALON) 100 MG Cap  Active   budesonide-formoterol (BREYNA) 160-4.5 MCG/ACT Aerosol  Active   Cholecalciferol (D3 PO)  Active   Cyanocobalamin (B-12 PO)  Active   metFORMIN ER (GLUCOPHAGE XR) 500 MG TABLET SR 24 HR  Active   promethazine-dextromethorphan (PROMETHAZINE-DM) 6.25-15 MG/5ML syrup  Active   rosuvastatin (CRESTOR) 20 MG Tab  Active   telmisartan (MICARDIS) 40 MG Tab  Active                    ALLERGIES  Allergies[1]    PHYSICAL EXAM  VITAL SIGNS: BP (!) 142/92   Pulse (!) 112   Temp 36.7 °C (98 °F) (Temporal)   Resp (!) 22   Ht 1.651 m (5' 5\")   Wt 92.8 kg (204 lb 9.4 oz)   LMP 06/04/2005 (LMP Unknown)   SpO2 93%   BMI 34.05 kg/m²    General The patient seems anxious    HEENT unremarkable    Pulmonary the patient's lungs are symmetrically diminished throughout with diffuse wheezing and rhonchi    Cardiovascular S1-S2 with a slightly tachycardic rate    GI patient's abdomen is soft    Skin no cyanosis    Extremities no distal edema    Neurologic examination is grossly intact    EKG/LABS  Results for orders placed or performed during the hospital encounter of 07/25/25   CBC w/ Differential    Collection Time: 07/25/25 11:00 AM   Result Value Ref Range    WBC 9.8 4.8 - 10.8 K/uL    RBC 5.11 4.20 - 5.40 M/uL    Hemoglobin 14.8 12.0 - 16.0 g/dL    Hematocrit 44.8 37.0 - 47.0 %    MCV 87.7 81.4 - 97.8 fL    MCH 29.0 27.0 - 33.0 pg    MCHC " 33.0 32.2 - 35.5 g/dL    RDW 45.0 35.9 - 50.0 fL    Platelet Count 378 164 - 446 K/uL    MPV 10.0 9.0 - 12.9 fL    Neutrophils-Polys 59.40 44.00 - 72.00 %    Lymphocytes 20.70 (L) 22.00 - 41.00 %    Monocytes 8.20 0.00 - 13.40 %    Eosinophils 10.70 (H) 0.00 - 6.90 %    Basophils 0.70 0.00 - 1.80 %    Immature Granulocytes 0.30 0.00 - 0.90 %    Nucleated RBC 0.00 0.00 - 0.20 /100 WBC    Neutrophils (Absolute) 5.80 1.82 - 7.42 K/uL    Lymphs (Absolute) 2.02 1.00 - 4.80 K/uL    Monos (Absolute) 0.80 0.00 - 0.85 K/uL    Eos (Absolute) 1.05 (H) 0.00 - 0.51 K/uL    Baso (Absolute) 0.07 0.00 - 0.12 K/uL    Immature Granulocytes (abs) 0.03 0.00 - 0.11 K/uL    NRBC (Absolute) 0.00 K/uL   Complete Metabolic Panel (CMP)    Collection Time: 07/25/25 11:00 AM   Result Value Ref Range    Sodium 138 135 - 145 mmol/L    Potassium 4.1 3.6 - 5.5 mmol/L    Chloride 102 96 - 112 mmol/L    Co2 21 20 - 33 mmol/L    Anion Gap 15.0 7.0 - 16.0    Glucose 111 (H) 65 - 99 mg/dL    Bun 16 8 - 22 mg/dL    Creatinine 0.75 0.50 - 1.40 mg/dL    Calcium 9.7 8.5 - 10.5 mg/dL    Correct Calcium 9.5 8.5 - 10.5 mg/dL    AST(SGOT) 23 12 - 45 U/L    ALT(SGPT) 40 2 - 50 U/L    Alkaline Phosphatase 57 30 - 99 U/L    Total Bilirubin 0.6 0.1 - 1.5 mg/dL    Albumin 4.2 3.2 - 4.9 g/dL    Total Protein 7.6 6.0 - 8.2 g/dL    Globulin 3.4 1.9 - 3.5 g/dL    A-G Ratio 1.2 g/dL   proBrain Natriuretic Peptide, NT    Collection Time: 07/25/25 11:00 AM   Result Value Ref Range    NT-proBNP 140 (H) 0 - 125 pg/mL   Troponin - STAT Once    Collection Time: 07/25/25 11:00 AM   Result Value Ref Range    Troponin T 9 6 - 19 ng/L   ESTIMATED GFR    Collection Time: 07/25/25 11:00 AM   Result Value Ref Range    GFR (CKD-EPI) 82 >60 mL/min/1.73 m 2   EKG    Collection Time: 07/25/25  1:17 PM   Result Value Ref Range    Report       [facility]    Test Date:  2025-07-25  Pt Name:    SYDNI RAPHAEL                Department: [facility]  MRN:         [id number]                      Room:  Gender:     Female                       Technician: DANILO  :        [id number]                   Requested By:ER TRIAGE PROTOCOL  Order #:    [id number]                    Reading MD: HITESH LE MD    Measurements  Intervals                                Axis  Rate:       98                           P:          80  ND:         83                           QRS:        56  QRSD:       57                           T:          -10  QT:         448  QTc:        573    Interpretive Statements  Twelve-lead EKG shows a sinus tachycardia with a ventricular to 98, QRS has  poor R progression, no ST segment elevation or depression, normal T waves.  Overall no dynamic change from prior  Electronically Signed On 2025 13:17:05 PDT by HITESH LE MD         I have independently interpreted this EKG    RADIOLOGY/PROCEDURES       Radiologist interpretation:  DX-CHEST-PORTABLE (1 VIEW)   Final Result      No evidence of acute cardiopulmonary process.          COURSE & MEDICAL DECISION MAKING    This a 74-year-old female who presents to the Emergency Department with shortness of breath.  She does have some diffuse wheezing as well as diminished breath sounds consistent with reactive airway disease.  The patient was treated aggressively with IV Decadron.  Initially she did not want a breathing treatment but subsequent was called back to the bedside as the patient had increased work of breathing.  Therefore she received albuterol nebulized treatment.  This has been significantly effective.  At the time of discharge the patient's aeration is increased and she has no further wheezing.  She is not tachypneic.  EKG is performed in triage via protocol and shows no ischemic change and his troponin is negative.  Chest x-ray does not show any evidence of a focal process such as pneumonia.  The patient's BNP is slightly elevated but clinically she does not show any evidence of heart  failure.  As mentioned above she has had significant improvement and therefore she will be discharged with instructions to follow-up with a pulmonologist and she will return if she is acutely worse.  The patient will be discharged on 4 days of prednisone.    FINAL DIAGNOSIS  1.  Reactive airway disease    Disposition  The patient will be discharged in improved condition     Electronically signed by: Carlos Kunz M.D., 7/25/2025 11:00 AM           [1]   Allergies  Allergen Reactions    Ace Inhibitors      Cough    Ciprofloxacin     Levaquin [Levofloxacin]

## 2025-07-25 NOTE — ED NOTES
Patient verbalized understanding to plan of care and discharge information. Patient in stable condition. Patient ambulated out of ED to person vehicle with stable gait with daughter.

## 2025-07-25 NOTE — ED TRIAGE NOTES
"Chief Complaint   Patient presents with    Shortness of Breath     For several days.   Reports taking \"so many medications!\", has been using nebs, inhalers, and her husbands albuterol to no avail. Reports feeling quite anxious and \"my whole body is inflamed!\"    Anxiety     Anxiety is visible.      Blood Pressure : (!) 142/92, Pulse: (!) 112, Respiration: (!) 22, Temperature: 36.7 °C (98 °F), Height: 165.1 cm (5' 5\"), Weight: 92.8 kg (204 lb 9.4 oz), BMI (Calculated): 34.05, BSA (Calculated): 2.1, Pulse Oximetry: 93 %, O2 Delivery Device: None - Room Air  "

## 2025-07-25 NOTE — ED NOTES
Patient continues to have inspiratory and expiratory wheezing however has improved post nebulizer.

## 2025-07-30 ENCOUNTER — APPOINTMENT (OUTPATIENT)
Dept: SLEEP MEDICINE | Facility: MEDICAL CENTER | Age: 75
End: 2025-07-30
Attending: STUDENT IN AN ORGANIZED HEALTH CARE EDUCATION/TRAINING PROGRAM
Payer: MEDICARE

## 2025-08-19 ENCOUNTER — PHYSICAL THERAPY (OUTPATIENT)
Dept: PHYSICAL THERAPY | Facility: MEDICAL CENTER | Age: 75
End: 2025-08-19
Attending: FAMILY MEDICINE
Payer: MEDICARE

## 2025-08-19 DIAGNOSIS — H81.10 BPPV (BENIGN PAROXYSMAL POSITIONAL VERTIGO), UNSPECIFIED LATERALITY: Primary | ICD-10-CM

## 2025-08-19 PROCEDURE — 97530 THERAPEUTIC ACTIVITIES: CPT
